# Patient Record
Sex: FEMALE | Race: WHITE | HISPANIC OR LATINO
[De-identification: names, ages, dates, MRNs, and addresses within clinical notes are randomized per-mention and may not be internally consistent; named-entity substitution may affect disease eponyms.]

---

## 2017-06-22 ENCOUNTER — LABORATORY RESULT (OUTPATIENT)
Age: 42
End: 2017-06-22

## 2017-06-22 ENCOUNTER — APPOINTMENT (OUTPATIENT)
Dept: INTERNAL MEDICINE | Facility: CLINIC | Age: 42
End: 2017-06-22

## 2017-06-22 VITALS
BODY MASS INDEX: 26.84 KG/M2 | SYSTOLIC BLOOD PRESSURE: 120 MMHG | DIASTOLIC BLOOD PRESSURE: 78 MMHG | OXYGEN SATURATION: 98 % | TEMPERATURE: 99.4 F | HEART RATE: 82 BPM | HEIGHT: 66 IN | RESPIRATION RATE: 16 BRPM | WEIGHT: 167 LBS

## 2017-06-22 DIAGNOSIS — B35.1 TINEA UNGUIUM: ICD-10-CM

## 2017-06-22 LAB
ALBUMIN SERPL ELPH-MCNC: 4.5 G/DL
ALP BLD-CCNC: 69 U/L
ALT SERPL-CCNC: 16 U/L
ANION GAP SERPL CALC-SCNC: 14 MMOL/L
APPEARANCE: CLEAR
AST SERPL-CCNC: 19 U/L
BASOPHILS # BLD AUTO: 0.01 K/UL
BASOPHILS NFR BLD AUTO: 0.2 %
BILIRUB SERPL-MCNC: 0.3 MG/DL
BILIRUBIN URINE: NEGATIVE
BLOOD URINE: ABNORMAL
BUN SERPL-MCNC: 11 MG/DL
CALCIUM SERPL-MCNC: 9.1 MG/DL
CHLORIDE SERPL-SCNC: 105 MMOL/L
CHOLEST SERPL-MCNC: 184 MG/DL
CHOLEST/HDLC SERPL: 3.4 RATIO
CO2 SERPL-SCNC: 23 MMOL/L
COLOR: YELLOW
CREAT SERPL-MCNC: 0.76 MG/DL
CREAT SPEC-SCNC: 134 MG/DL
EOSINOPHIL # BLD AUTO: 0.03 K/UL
EOSINOPHIL NFR BLD AUTO: 0.5 %
ERYTHROCYTE [SEDIMENTATION RATE] IN BLOOD BY WESTERGREN METHOD: 36 MM/HR
FOLATE SERPL-MCNC: >20 NG/ML
GGT SERPL-CCNC: 15 U/L
GLUCOSE QUALITATIVE U: NORMAL MG/DL
GLUCOSE SERPL-MCNC: 95 MG/DL
HBA1C MFR BLD HPLC: 5.5 %
HCT VFR BLD CALC: 30.6 %
HDLC SERPL-MCNC: 54 MG/DL
HGB BLD-MCNC: 9 G/DL
IMM GRANULOCYTES NFR BLD AUTO: 0.2 %
IRON SATN MFR SERPL: 4 %
IRON SERPL-MCNC: 21 UG/DL
KETONES URINE: NEGATIVE
LDLC SERPL CALC-MCNC: 116 MG/DL
LEUKOCYTE ESTERASE URINE: NEGATIVE
LYMPHOCYTES # BLD AUTO: 1.49 K/UL
LYMPHOCYTES NFR BLD AUTO: 25.9 %
MAN DIFF?: NORMAL
MCHC RBC-ENTMCNC: 22.1 PG
MCHC RBC-ENTMCNC: 29.4 GM/DL
MCV RBC AUTO: 75.2 FL
MICROALBUMIN 24H UR DL<=1MG/L-MCNC: 2.6 MG/DL
MICROALBUMIN/CREAT 24H UR-RTO: 19 MG/G
MONOCYTES # BLD AUTO: 0.31 K/UL
MONOCYTES NFR BLD AUTO: 5.4 %
NEUTROPHILS # BLD AUTO: 3.9 K/UL
NEUTROPHILS NFR BLD AUTO: 67.8 %
NITRITE URINE: NEGATIVE
PH URINE: 5.5
PLATELET # BLD AUTO: 317 K/UL
POTASSIUM SERPL-SCNC: 4.6 MMOL/L
PROT SERPL-MCNC: 7.4 G/DL
PROTEIN URINE: NEGATIVE MG/DL
RBC # BLD: 4.07 M/UL
RBC # FLD: 18.4 %
SODIUM SERPL-SCNC: 142 MMOL/L
SPECIFIC GRAVITY URINE: 1.02
T3 SERPL-MCNC: 110 NG/DL
T4 FREE SERPL-MCNC: 1 NG/DL
TIBC SERPL-MCNC: 508 UG/DL
TRIGL SERPL-MCNC: 71 MG/DL
TSH SERPL-ACNC: 1.54 UIU/ML
UIBC SERPL-MCNC: 487 UG/DL
UROBILINOGEN URINE: NORMAL MG/DL
VIT B12 SERPL-MCNC: 1176 PG/ML
WBC # FLD AUTO: 5.75 K/UL

## 2017-06-23 LAB — 25(OH)D3 SERPL-MCNC: 24.3 NG/ML

## 2017-09-16 ENCOUNTER — RX RENEWAL (OUTPATIENT)
Age: 42
End: 2017-09-16

## 2017-11-30 ENCOUNTER — APPOINTMENT (OUTPATIENT)
Dept: INTERNAL MEDICINE | Facility: CLINIC | Age: 42
End: 2017-11-30
Payer: MEDICAID

## 2017-11-30 ENCOUNTER — NON-APPOINTMENT (OUTPATIENT)
Age: 42
End: 2017-11-30

## 2017-11-30 VITALS
DIASTOLIC BLOOD PRESSURE: 70 MMHG | HEART RATE: 71 BPM | WEIGHT: 156 LBS | BODY MASS INDEX: 25.07 KG/M2 | RESPIRATION RATE: 16 BRPM | HEIGHT: 66 IN | OXYGEN SATURATION: 99 % | SYSTOLIC BLOOD PRESSURE: 120 MMHG | TEMPERATURE: 98.3 F

## 2017-11-30 LAB
BASOPHILS # BLD AUTO: 0.02 K/UL
EOSINOPHIL NFR BLD AUTO: 1 %
HGB BLD-MCNC: 12.8 G/DL
IMM GRANULOCYTES NFR BLD AUTO: 0 %
LYMPHOCYTES NFR BLD AUTO: 29.4 %
MCHC RBC-ENTMCNC: 31.7 GM/DL
MCV RBC AUTO: 97.6 FL
MONOCYTES # BLD AUTO: 0.37 K/UL
NEUTROPHILS # BLD AUTO: 2.94 K/UL

## 2017-11-30 PROCEDURE — 93000 ELECTROCARDIOGRAM COMPLETE: CPT

## 2017-11-30 PROCEDURE — 99214 OFFICE O/P EST MOD 30 MIN: CPT | Mod: 25

## 2017-12-01 LAB
BASOPHILS NFR BLD AUTO: 0.4 %
CHOLEST SERPL-MCNC: 185 MG/DL
CHOLEST/HDLC SERPL: 3.4 RATIO
EOSINOPHIL # BLD AUTO: 0.05 K/UL
FERRITIN SERPL-MCNC: 23 NG/ML
GGT SERPL-CCNC: 15 U/L
HBA1C MFR BLD HPLC: 5.4 %
HCT VFR BLD CALC: 40.4 %
HDLC SERPL-MCNC: 54 MG/DL
IRON SATN MFR SERPL: 18 %
IRON SERPL-MCNC: 77 UG/DL
LDLC SERPL CALC-MCNC: 116 MG/DL
LYMPHOCYTES # BLD AUTO: 1.41 K/UL
MAN DIFF?: NORMAL
MCHC RBC-ENTMCNC: 30.9 PG
MONOCYTES NFR BLD AUTO: 7.7 %
NEUTROPHILS NFR BLD AUTO: 61.5 %
PLATELET # BLD AUTO: 244 K/UL
RBC # BLD: 4.14 M/UL
RBC # FLD: 13.4 %
T3 SERPL-MCNC: 106 NG/DL
T4 FREE SERPL-MCNC: 0.9 NG/DL
TIBC SERPL-MCNC: 433 UG/DL
TRIGL SERPL-MCNC: 75 MG/DL
TSH SERPL-ACNC: 1.22 UIU/ML
UIBC SERPL-MCNC: 356 UG/DL
VIT B12 SERPL-MCNC: 1127 PG/ML
WBC # FLD AUTO: 4.79 K/UL

## 2017-12-07 ENCOUNTER — APPOINTMENT (OUTPATIENT)
Dept: INTERNAL MEDICINE | Facility: CLINIC | Age: 42
End: 2017-12-07

## 2017-12-13 LAB
ALBUMIN SERPL ELPH-MCNC: 4.2 G/DL
ALP BLD-CCNC: 59 U/L
ALT SERPL-CCNC: 21 U/L
ANION GAP SERPL CALC-SCNC: 13 MMOL/L
AST SERPL-CCNC: 23 U/L
BILIRUB SERPL-MCNC: 0.5 MG/DL
BUN SERPL-MCNC: 17 MG/DL
CALCIUM SERPL-MCNC: 9.8 MG/DL
CHLORIDE SERPL-SCNC: 104 MMOL/L
CO2 SERPL-SCNC: 24 MMOL/L
CREAT SERPL-MCNC: 0.72 MG/DL
GLUCOSE SERPL-MCNC: 87 MG/DL
POTASSIUM SERPL-SCNC: 5 MMOL/L
PROT SERPL-MCNC: 7.1 G/DL
SODIUM SERPL-SCNC: 141 MMOL/L

## 2018-01-01 ENCOUNTER — RX RENEWAL (OUTPATIENT)
Age: 43
End: 2018-01-01

## 2018-01-17 ENCOUNTER — RX RENEWAL (OUTPATIENT)
Age: 43
End: 2018-01-17

## 2018-02-06 ENCOUNTER — RX RENEWAL (OUTPATIENT)
Age: 43
End: 2018-02-06

## 2018-04-16 ENCOUNTER — RX RENEWAL (OUTPATIENT)
Age: 43
End: 2018-04-16

## 2018-06-21 ENCOUNTER — APPOINTMENT (OUTPATIENT)
Dept: INTERNAL MEDICINE | Facility: CLINIC | Age: 43
End: 2018-06-21
Payer: MEDICAID

## 2018-06-21 VITALS
BODY MASS INDEX: 26.2 KG/M2 | RESPIRATION RATE: 16 BRPM | HEIGHT: 66 IN | OXYGEN SATURATION: 99 % | SYSTOLIC BLOOD PRESSURE: 120 MMHG | WEIGHT: 163 LBS | DIASTOLIC BLOOD PRESSURE: 80 MMHG | TEMPERATURE: 98.9 F | HEART RATE: 63 BPM

## 2018-06-21 PROCEDURE — 99214 OFFICE O/P EST MOD 30 MIN: CPT

## 2018-06-21 NOTE — ASSESSMENT
[FreeTextEntry1] : 43 year old female found to have stable Iron Deficiency Anemia, Vitamin D Deficiency, Elevated Hemoglobin A1c, Chronic Back Pain,with the current regimen, diet and life style modifications, as counseled. Prior results reviewed and discussed with the patient during today's examination. Plan as ordered.\par Patient was recommended to follow up with GYN for routine examination, PAP smear and Mammogram, reports will be provided, when ready.\par

## 2018-06-21 NOTE — HISTORY OF PRESENT ILLNESS
[de-identified] : 43 year old  female patient with history of stable  Iron Deficiency Anemia, Vitamin D Deficiency, Elevated Hemoglobin A1c, Chronic Back Pain, history as stated, presented for follow up examination. Patient is compliant with all medications. Denies shortness of breath, chest pain or abdominal pains at this time. ROS as stated.\par

## 2018-06-21 NOTE — HEALTH RISK ASSESSMENT
[Discussed at today's visit] : Advance Directives Discussed at today's visit [Fully functional (bathing, dressing, toileting, transferring, walking, feeding)] : Fully functional (bathing, dressing, toileting, transferring, walking, feeding) [Fully functional (using the telephone, shopping, preparing meals, housekeeping, doing laundry, using] : Fully functional and needs no help or supervision to perform IADLs (using the telephone, shopping, preparing meals, housekeeping, doing laundry, using transportation, managing medications and managing finances) [No falls in past year] : Patient reported no falls in the past year [0] : 2) Feeling down, depressed, or hopeless: Not at all (0) [] : No [de-identified] : None [[id number]] : 0

## 2018-06-23 LAB
25(OH)D3 SERPL-MCNC: 39 NG/ML
ALBUMIN SERPL ELPH-MCNC: 4.1 G/DL
ALP BLD-CCNC: 53 U/L
ALT SERPL-CCNC: 20 U/L
ANION GAP SERPL CALC-SCNC: 9 MMOL/L
APPEARANCE: CLEAR
AST SERPL-CCNC: 24 U/L
BASOPHILS # BLD AUTO: 0.01 K/UL
BASOPHILS NFR BLD AUTO: 0.2 %
BILIRUB SERPL-MCNC: 0.4 MG/DL
BILIRUBIN URINE: NEGATIVE
BLOOD URINE: NEGATIVE
BUN SERPL-MCNC: 13 MG/DL
CALCIUM SERPL-MCNC: 9.2 MG/DL
CHLORIDE SERPL-SCNC: 104 MMOL/L
CHOLEST SERPL-MCNC: 169 MG/DL
CHOLEST/HDLC SERPL: 3 RATIO
CO2 SERPL-SCNC: 26 MMOL/L
COLOR: YELLOW
CREAT SERPL-MCNC: 0.67 MG/DL
CREAT SPEC-SCNC: 128 MG/DL
EOSINOPHIL # BLD AUTO: 0.03 K/UL
EOSINOPHIL NFR BLD AUTO: 0.6 %
ERYTHROCYTE [SEDIMENTATION RATE] IN BLOOD BY WESTERGREN METHOD: 3 MM/HR
FOLATE SERPL-MCNC: >20 NG/ML
GGT SERPL-CCNC: 15 U/L
GLUCOSE QUALITATIVE U: NEGATIVE MG/DL
GLUCOSE SERPL-MCNC: 79 MG/DL
HBA1C MFR BLD HPLC: 5.5 %
HCT VFR BLD CALC: 34.9 %
HDLC SERPL-MCNC: 56 MG/DL
HGB BLD-MCNC: 11.1 G/DL
IMM GRANULOCYTES NFR BLD AUTO: 0 %
IRON SATN MFR SERPL: 9 %
IRON SERPL-MCNC: 36 UG/DL
KETONES URINE: NEGATIVE
LDLC SERPL CALC-MCNC: 99 MG/DL
LEUKOCYTE ESTERASE URINE: NEGATIVE
LYMPHOCYTES # BLD AUTO: 1.26 K/UL
LYMPHOCYTES NFR BLD AUTO: 24.7 %
MAN DIFF?: NORMAL
MCHC RBC-ENTMCNC: 30.7 PG
MCHC RBC-ENTMCNC: 31.8 GM/DL
MCV RBC AUTO: 96.4 FL
MICROALBUMIN 24H UR DL<=1MG/L-MCNC: 0.5 MG/DL
MICROALBUMIN/CREAT 24H UR-RTO: 4 MG/G
MONOCYTES # BLD AUTO: 0.31 K/UL
MONOCYTES NFR BLD AUTO: 6.1 %
NEUTROPHILS # BLD AUTO: 3.5 K/UL
NEUTROPHILS NFR BLD AUTO: 68.4 %
NITRITE URINE: NEGATIVE
PH URINE: 6
PLATELET # BLD AUTO: 256 K/UL
POTASSIUM SERPL-SCNC: 4.3 MMOL/L
PROT SERPL-MCNC: 6.9 G/DL
PROTEIN URINE: NEGATIVE MG/DL
RBC # BLD: 3.62 M/UL
RBC # FLD: 13.1 %
SODIUM SERPL-SCNC: 139 MMOL/L
SPECIFIC GRAVITY URINE: 1.02
T3 SERPL-MCNC: 114 NG/DL
T4 FREE SERPL-MCNC: 1 NG/DL
TIBC SERPL-MCNC: 388 UG/DL
TRIGL SERPL-MCNC: 70 MG/DL
TSH SERPL-ACNC: 1.09 UIU/ML
UIBC SERPL-MCNC: 352 UG/DL
UROBILINOGEN URINE: NEGATIVE MG/DL
VIT B12 SERPL-MCNC: 1090 PG/ML
WBC # FLD AUTO: 5.11 K/UL

## 2019-01-01 ENCOUNTER — OUTPATIENT (OUTPATIENT)
Dept: OUTPATIENT SERVICES | Facility: HOSPITAL | Age: 44
LOS: 1 days | End: 2019-01-01
Payer: MEDICAID

## 2019-01-01 PROCEDURE — G9001: CPT

## 2019-01-10 ENCOUNTER — APPOINTMENT (OUTPATIENT)
Dept: INTERNAL MEDICINE | Facility: CLINIC | Age: 44
End: 2019-01-10
Payer: MEDICAID

## 2019-01-10 VITALS
BODY MASS INDEX: 26.03 KG/M2 | WEIGHT: 162 LBS | OXYGEN SATURATION: 100 % | HEIGHT: 66 IN | DIASTOLIC BLOOD PRESSURE: 70 MMHG | RESPIRATION RATE: 16 BRPM | SYSTOLIC BLOOD PRESSURE: 120 MMHG | TEMPERATURE: 98.4 F | HEART RATE: 69 BPM

## 2019-01-10 PROCEDURE — 99214 OFFICE O/P EST MOD 30 MIN: CPT

## 2019-01-10 NOTE — HISTORY OF PRESENT ILLNESS
[de-identified] : 43 year old  female patient with history of stable  Iron Deficiency Anemia, Vitamin D Deficiency, Elevated Hemoglobin A1c, Chronic Back Pain, history as stated, presented for follow up examination. Patient is compliant with all medications. Denies shortness of breath, chest pain or abdominal pains at this time. ROS as stated.\par

## 2019-01-10 NOTE — HEALTH RISK ASSESSMENT
[No falls in past year] : Patient reported no falls in the past year [0] : 2) Feeling down, depressed, or hopeless: Not at all (0) [] : No [de-identified] : None [[id number]] : 0

## 2019-01-10 NOTE — ASSESSMENT
[FreeTextEntry1] : 43 year old female found to have stable  Iron Deficiency Anemia, Vitamin D Deficiency, Elevated Hemoglobin A1c, Chronic Back Pain,with the current regimen, diet and life style modifications, as counseled. Prior results reviewed and discussed with the patient during today's examination. Plan as ordered.\par Current symptoms are consistent with Left knee arthropathy , prescription management and further followup as ordered.\par

## 2019-01-12 LAB
ALBUMIN SERPL ELPH-MCNC: 4.3 G/DL
ALP BLD-CCNC: 62 U/L
ALT SERPL-CCNC: 18 U/L
ANION GAP SERPL CALC-SCNC: 9 MMOL/L
AST SERPL-CCNC: 19 U/L
BASOPHILS # BLD AUTO: 0.01 K/UL
BASOPHILS NFR BLD AUTO: 0.2 %
BILIRUB SERPL-MCNC: 0.2 MG/DL
BUN SERPL-MCNC: 13 MG/DL
CALCIUM SERPL-MCNC: 9.3 MG/DL
CHLORIDE SERPL-SCNC: 105 MMOL/L
CHOLEST SERPL-MCNC: 178 MG/DL
CHOLEST/HDLC SERPL: 3.4 RATIO
CO2 SERPL-SCNC: 28 MMOL/L
CREAT SERPL-MCNC: 0.61 MG/DL
EOSINOPHIL # BLD AUTO: 0.04 K/UL
EOSINOPHIL NFR BLD AUTO: 0.9 %
GGT SERPL-CCNC: 14 U/L
GLUCOSE SERPL-MCNC: 95 MG/DL
HBA1C MFR BLD HPLC: 5.4 %
HCT VFR BLD CALC: 37.7 %
HDLC SERPL-MCNC: 53 MG/DL
HGB BLD-MCNC: 11.7 G/DL
IMM GRANULOCYTES NFR BLD AUTO: 0 %
IRON SATN MFR SERPL: 10 %
IRON SERPL-MCNC: 41 UG/DL
LDLC SERPL CALC-MCNC: 108 MG/DL
LYMPHOCYTES # BLD AUTO: 1.21 K/UL
LYMPHOCYTES NFR BLD AUTO: 27.8 %
MAN DIFF?: NORMAL
MCHC RBC-ENTMCNC: 29.5 PG
MCHC RBC-ENTMCNC: 31 GM/DL
MCV RBC AUTO: 95 FL
MONOCYTES # BLD AUTO: 0.27 K/UL
MONOCYTES NFR BLD AUTO: 6.2 %
NEUTROPHILS # BLD AUTO: 2.82 K/UL
NEUTROPHILS NFR BLD AUTO: 64.9 %
PLATELET # BLD AUTO: 274 K/UL
POTASSIUM SERPL-SCNC: 4.6 MMOL/L
PROT SERPL-MCNC: 7 G/DL
RBC # BLD: 3.97 M/UL
RBC # FLD: 12.9 %
SODIUM SERPL-SCNC: 142 MMOL/L
TIBC SERPL-MCNC: 423 UG/DL
TRIGL SERPL-MCNC: 84 MG/DL
UIBC SERPL-MCNC: 382 UG/DL
WBC # FLD AUTO: 4.35 K/UL

## 2019-01-18 ENCOUNTER — APPOINTMENT (OUTPATIENT)
Dept: ORTHOPEDIC SURGERY | Facility: CLINIC | Age: 44
End: 2019-01-18
Payer: MEDICAID

## 2019-01-18 VITALS
WEIGHT: 164 LBS | HEIGHT: 66 IN | DIASTOLIC BLOOD PRESSURE: 73 MMHG | BODY MASS INDEX: 26.36 KG/M2 | SYSTOLIC BLOOD PRESSURE: 114 MMHG | HEART RATE: 70 BPM

## 2019-01-18 DIAGNOSIS — M70.42 PREPATELLAR BURSITIS, LEFT KNEE: ICD-10-CM

## 2019-01-18 PROCEDURE — 99204 OFFICE O/P NEW MOD 45 MIN: CPT

## 2019-01-18 PROCEDURE — 73564 X-RAY EXAM KNEE 4 OR MORE: CPT | Mod: LT

## 2019-01-18 NOTE — DISCUSSION/SUMMARY
[de-identified] : Left knee prepatellar bursitis\par \par \par Discussed my findings and history exam and radiology\par \par Recommend activity modification to limit kneeling\par Recommend knee pads if kneeling\par \par Physical therapy for activities strengthening of the knee\par \par The patient was prescribed Diclofenac PO non-steroidal anti-inflammatory medication. 50mg tablets twice daily to be taken for at least 1-2 weeks in a row and then PRN afterwards. Risks and benefits were discussed and include but not limited to renal damage and GI ulceration and bleeding.  They were advised to take with food to limit stomach upset as well as warned to stop the medication if worsening gastric pain or dizziness or other side effects. Also to immediately stop the medication and seek appropriate medical attention if any severe stomach ache, gastritis, black/red vomit, black/red stools or any other medical concern.\par \par \par \par Warm moist compress\par \par Followup p.r.n.

## 2019-01-18 NOTE — PHYSICAL EXAM
[de-identified] : Physical Examination\par General: well nourished, in no acute distress, alert and oriented to person, place and time\par Psychiatric: normal mood and affect, no abnormal movements or speech patterns\par Eyes: vision intact - glasses\par Throat: no thyromegaly\par Lymph: no enlarged nodes, no lymphedema in extremity\par Respiratory: no wheezing, no shortness of breath with ambulation\par Cardiac: no cardiac leg swelling, 2+ peripheral pulses\par Neurology: normal gross sensation in extremities to light touch\par Abdomen: soft, non-tender, tympanic, no masses\par \par Musculoskeletal Examination\par Ambulation	-- antalgic gait, - assistive devices\par \par Knee			Right			Left\par General\par      Swelling/Deformity	normal			small swelling firm non fluctuant and inferior pole patella bursa	\par      Skin			normal			normal\par      Erythema		-			-\par      Standing Alignment	neutral			neutral\par      Effusion		none			none\par Range of Motion\par      Hip			full painless ROM		full painless ROM\par      Knee Flexion		130			130\par      Knee Extension	0			0\par Patella\par      J Sign		-			-\par      Quad Medial/Lateral	1/1 1/1\par      Apprehension		-			-\par      Quincy's		-			-\par      Grind Sign		-			-\par      Crepitus		-			-\par Palpation\par      Medial Joint Line	-			-\par      Medial Fem Condyle	-			-\par      Lateral Joint Line	-			-\par      Quad Tendon		-			-\par      Patella Tendon	-			-\par      Medial Patella		-			-\par      Lateral Patella 	-			-\par      Posterior Knee	-			-\par Ligamentous\par      Varus @ 0° / 30°	-/-			-/-\par      Valgus @ 0° / 30°	-/-			-/-\par      Lachman		-			-\par      Pivot Shift		-			-\par      Anterior Drawer	-			-\par      Posterior Drawer	-			-\par Meniscus\par      Elijah		-			-\par      Flexion Pinch		-			-\par Strength Examination/Atrophy\par      Hip Flexors 		5+			5+\par      Quadriceps		5+			5+\par      Hamstring		5+			5+\par      Tibialis Anterior	5+			5+\par      Achilles/Soleus	5+			5+\par Sensation\par      Deep Peroneal	normal			normal\par      Superficial Peroneal 	normal			normal\par      Sural  		normal			normal\par      Posterior Tibial 	normal			normal\par      Saphneous 		normal			normal\par Pulses\par      DP			2+			2+\par  [de-identified] : 5 views of the affected Left knee (standing AP, flexing standing AP, 30degree flexed lateral, 0degree lateral, sunrise view)\par were ordered, obtained and evaluated by myself today and\par demonstrate:\par There is trace medial weight bearing as fractured narrowing\par Trace osteophytic lipping\par no suprapatellar effusion\par Trace lateral patellofemoral joint space loss without evidence of tilt [or] subluxation on sunrise view\par Normal soft tissue density\par Otherwise normal osseous bone structure without fracture or dislocation

## 2019-01-25 DIAGNOSIS — Z71.89 OTHER SPECIFIED COUNSELING: ICD-10-CM

## 2019-03-08 ENCOUNTER — RX RENEWAL (OUTPATIENT)
Age: 44
End: 2019-03-08

## 2019-05-14 ENCOUNTER — APPOINTMENT (OUTPATIENT)
Dept: CARDIOLOGY | Facility: CLINIC | Age: 44
End: 2019-05-14
Payer: MEDICAID

## 2019-05-14 VITALS
HEIGHT: 66 IN | DIASTOLIC BLOOD PRESSURE: 85 MMHG | SYSTOLIC BLOOD PRESSURE: 133 MMHG | TEMPERATURE: 99.1 F | HEART RATE: 75 BPM | OXYGEN SATURATION: 100 %

## 2019-05-14 DIAGNOSIS — Z82.49 FAMILY HISTORY OF ISCHEMIC HEART DISEASE AND OTHER DISEASES OF THE CIRCULATORY SYSTEM: ICD-10-CM

## 2019-05-14 PROCEDURE — 99204 OFFICE O/P NEW MOD 45 MIN: CPT

## 2019-05-14 PROCEDURE — 93000 ELECTROCARDIOGRAM COMPLETE: CPT

## 2019-05-23 ENCOUNTER — OUTPATIENT (OUTPATIENT)
Dept: OUTPATIENT SERVICES | Facility: HOSPITAL | Age: 44
LOS: 1 days | End: 2019-05-23
Payer: MEDICAID

## 2019-05-23 DIAGNOSIS — R06.09 OTHER FORMS OF DYSPNEA: ICD-10-CM

## 2019-05-23 DIAGNOSIS — R00.2 PALPITATIONS: ICD-10-CM

## 2019-05-23 PROCEDURE — 93306 TTE W/DOPPLER COMPLETE: CPT | Mod: 26

## 2019-05-23 PROCEDURE — 93018 CV STRESS TEST I&R ONLY: CPT

## 2019-05-23 PROCEDURE — 93306 TTE W/DOPPLER COMPLETE: CPT

## 2019-05-23 PROCEDURE — 93016 CV STRESS TEST SUPVJ ONLY: CPT

## 2019-05-23 PROCEDURE — 93017 CV STRESS TEST TRACING ONLY: CPT

## 2019-05-24 NOTE — ADDENDUM
[FreeTextEntry1] : ADDENDUM 5/24: Patient achieved 9 METS on stress test yesterday with no evidence of ischemia. Her echocardiogram showed preserved EF and normal RV pressures. Will refer her to see pulmonologist Dr. Gonzales. Results d/w patient's  over the phone at 16:20, I gave him Dr. Gonzales's office #.

## 2019-05-24 NOTE — ASSESSMENT
[FreeTextEntry1] : 44-year-old female with no significant medical history who presents for evaluation of exertional dyspnea as well as palpitations.\par \par 1. Exertional dyspnea: while this may be the result of deconditioning, will check echocardiogram to assess for any structural heart abnormalities\par -There is lower suspicion for CAD as contributing to patient's etiology given her lack of comorbidities, family history, or other cardiac risk factors. Therefore, will send patient for treadmill EKG stress test for further assessment of functional capacity\par -If above testing is noncontributory, will subsequently refer patient to pulmonologist to check PFTs and evaluate for underlying asthma or other lung disease\par \par 2. Palpitations:\par -Symptoms are infrequent and mild in severity; even if patient has an underlying arrhythmia such as SVT, she would probably not benefit from any treatment at this time given the mildness of symptoms\par -Will send patient for exercise stress test as per above to see if arrhythmia can be provoked\par -Otherwise, patient will let us know if symptoms become more frequent or bothersome, at that point we can perform additional testing as needed

## 2019-05-24 NOTE — HISTORY OF PRESENT ILLNESS
[FreeTextEntry1] : 44-year-old female with anxiety who presents for evaluation of exertional dyspnea. She reports that she feels dyspnea whenever she ascends a set of stairs. There is no associated chest pain, lightheadedness, palpitations, or syncope. Patient does not get symptoms at rest, and she denies any lower extremity edema, orthopnea, or PND.\par \par Patient also reports that she occasionally gets palpitations. These occur infrequently, perhaps once every one to 2 months, and last for approximately 10 minutes. Symptoms do not wake patient up from sleep, they are associated with dyspnea but resolve of their own accord.\par \par Currently, patient has no symptoms in the office.

## 2019-06-25 ENCOUNTER — APPOINTMENT (OUTPATIENT)
Dept: PULMONOLOGY | Facility: CLINIC | Age: 44
End: 2019-06-25
Payer: MEDICAID

## 2019-06-25 VITALS
WEIGHT: 171 LBS | SYSTOLIC BLOOD PRESSURE: 143 MMHG | OXYGEN SATURATION: 99 % | TEMPERATURE: 98.7 F | HEART RATE: 63 BPM | HEIGHT: 66 IN | DIASTOLIC BLOOD PRESSURE: 77 MMHG | BODY MASS INDEX: 27.48 KG/M2

## 2019-06-25 DIAGNOSIS — Z82.49 FAMILY HISTORY OF ISCHEMIC HEART DISEASE AND OTHER DISEASES OF THE CIRCULATORY SYSTEM: ICD-10-CM

## 2019-06-25 DIAGNOSIS — Z82.5 FAMILY HISTORY OF ASTHMA AND OTHER CHRONIC LOWER RESPIRATORY DISEASES: ICD-10-CM

## 2019-06-25 DIAGNOSIS — Z56.0 UNEMPLOYMENT, UNSPECIFIED: ICD-10-CM

## 2019-06-25 PROCEDURE — 94726 PLETHYSMOGRAPHY LUNG VOLUMES: CPT

## 2019-06-25 PROCEDURE — ZZZZZ: CPT

## 2019-06-25 PROCEDURE — 94729 DIFFUSING CAPACITY: CPT

## 2019-06-25 PROCEDURE — 99203 OFFICE O/P NEW LOW 30 MIN: CPT | Mod: 25

## 2019-06-25 PROCEDURE — 94010 BREATHING CAPACITY TEST: CPT

## 2019-06-25 SDOH — ECONOMIC STABILITY - INCOME SECURITY: UNEMPLOYMENT, UNSPECIFIED: Z56.0

## 2019-06-26 PROBLEM — Z82.5 FAMILY HISTORY OF ASTHMA: Status: ACTIVE | Noted: 2019-06-25

## 2019-06-26 PROBLEM — Z56.0 UNEMPLOYED: Status: ACTIVE | Noted: 2019-06-25

## 2019-06-26 PROBLEM — Z82.49 FAMILY HISTORY OF MYOCARDIAL INFARCTION: Status: ACTIVE | Noted: 2019-06-25

## 2019-06-26 PROBLEM — Z82.49 FAMILY HISTORY OF PULMONARY EMBOLISM: Status: ACTIVE | Noted: 2019-06-25

## 2019-06-26 PROBLEM — Z82.49 FAMILY HISTORY OF HYPERTENSION: Status: ACTIVE | Noted: 2019-06-25

## 2019-06-26 NOTE — PHYSICAL EXAM
[General Appearance - Well Developed] : well developed [Normal Appearance] : normal appearance [Well Groomed] : well groomed [General Appearance - Well Nourished] : well nourished [No Deformities] : no deformities [General Appearance - In No Acute Distress] : no acute distress [Normal Conjunctiva] : the conjunctiva exhibited no abnormalities [Normal Oropharynx] : normal oropharynx [Neck Appearance] : the appearance of the neck was normal [Neck Cervical Mass (___cm)] : no neck mass was observed [Jugular Venous Distention Increased] : there was no jugular-venous distention [Heart Rate And Rhythm] : heart rate and rhythm were normal [Heart Sounds] : normal S1 and S2 [Murmurs] : no murmurs present [Edema] : no peripheral edema present [Respiration, Rhythm And Depth] : normal respiratory rhythm and effort [Exaggerated Use Of Accessory Muscles For Inspiration] : no accessory muscle use [Auscultation Breath Sounds / Voice Sounds] : lungs were clear to auscultation bilaterally [Abdomen Soft] : soft [Abdomen Tenderness] : non-tender [Abnormal Walk] : normal gait [Nail Clubbing] : no clubbing of the fingernails [Cyanosis, Localized] : no localized cyanosis [Skin Color & Pigmentation] : normal skin color and pigmentation [Skin Turgor] : normal skin turgor [] : no rash [No Focal Deficits] : no focal deficits [Oriented To Time, Place, And Person] : oriented to person, place, and time [Impaired Insight] : insight and judgment were intact

## 2019-07-02 ENCOUNTER — RX RENEWAL (OUTPATIENT)
Age: 44
End: 2019-07-02

## 2019-09-09 ENCOUNTER — RX RENEWAL (OUTPATIENT)
Age: 44
End: 2019-09-09

## 2019-11-19 ENCOUNTER — APPOINTMENT (OUTPATIENT)
Dept: INTERNAL MEDICINE | Facility: CLINIC | Age: 44
End: 2019-11-19
Payer: MEDICAID

## 2019-11-19 ENCOUNTER — LABORATORY RESULT (OUTPATIENT)
Age: 44
End: 2019-11-19

## 2019-11-19 VITALS
RESPIRATION RATE: 16 BRPM | TEMPERATURE: 98.3 F | BODY MASS INDEX: 27.64 KG/M2 | HEART RATE: 64 BPM | DIASTOLIC BLOOD PRESSURE: 80 MMHG | HEIGHT: 66 IN | WEIGHT: 172 LBS | OXYGEN SATURATION: 98 % | SYSTOLIC BLOOD PRESSURE: 120 MMHG

## 2019-11-19 DIAGNOSIS — R20.2 PARESTHESIA OF SKIN: ICD-10-CM

## 2019-11-19 PROCEDURE — 99214 OFFICE O/P EST MOD 30 MIN: CPT

## 2019-11-19 NOTE — HEALTH RISK ASSESSMENT
[No] : In the past 12 months have you used drugs other than those required for medical reasons? No [No falls in past year] : Patient reported no falls in the past year [0] : 2) Feeling down, depressed, or hopeless: Not at all (0) [] : No [[id number]] : 0 [de-identified] : PULM

## 2019-11-19 NOTE — HISTORY OF PRESENT ILLNESS
[de-identified] : 43 year old  female patient with history of stable  Iron Deficiency Anemia, Vitamin D Deficiency, Elevated Hemoglobin A1c, Chronic Back Pain, history as stated, presented for follow up examination. Patient is compliant with all medications. Denies shortness of breath, chest pain or abdominal pains at this time. ROS as stated.\par

## 2019-11-19 NOTE — ASSESSMENT
[FreeTextEntry1] : 44 year old female found to have stable  Iron Deficiency Anemia, Vitamin D Deficiency, Elevated Hemoglobin A1c, Chronic Back Pain, Renal Stone, with the current regimen, diet and life style modifications, as counseled. Prior results reviewed and discussed with the patient during today's examination. Plan as ordered.\par

## 2019-11-20 LAB
ALBUMIN SERPL ELPH-MCNC: 4.4 G/DL
ALP BLD-CCNC: 72 U/L
ALT SERPL-CCNC: 14 U/L
ANION GAP SERPL CALC-SCNC: 14 MMOL/L
APPEARANCE: CLEAR
AST SERPL-CCNC: 18 U/L
BASOPHILS # BLD AUTO: 0.04 K/UL
BASOPHILS NFR BLD AUTO: 0.5 %
BILIRUB SERPL-MCNC: 0.2 MG/DL
BILIRUBIN URINE: NEGATIVE
BLOOD URINE: ABNORMAL
BUN SERPL-MCNC: 12 MG/DL
CALCIUM SERPL-MCNC: 9.9 MG/DL
CHLORIDE SERPL-SCNC: 101 MMOL/L
CHOLEST SERPL-MCNC: 194 MG/DL
CHOLEST/HDLC SERPL: 4 RATIO
CO2 SERPL-SCNC: 24 MMOL/L
COLOR: NORMAL
CREAT SERPL-MCNC: 0.64 MG/DL
EOSINOPHIL # BLD AUTO: 0.08 K/UL
EOSINOPHIL NFR BLD AUTO: 1.1 %
ESTIMATED AVERAGE GLUCOSE: 111 MG/DL
GGT SERPL-CCNC: 20 U/L
GLUCOSE QUALITATIVE U: NEGATIVE
GLUCOSE SERPL-MCNC: 86 MG/DL
HBA1C MFR BLD HPLC: 5.5 %
HCT VFR BLD CALC: 41.6 %
HDLC SERPL-MCNC: 48 MG/DL
HGB BLD-MCNC: 12.8 G/DL
IMM GRANULOCYTES NFR BLD AUTO: 0.4 %
IRON SATN MFR SERPL: 15 %
IRON SERPL-MCNC: 66 UG/DL
KETONES URINE: NEGATIVE
LDLC SERPL CALC-MCNC: 110 MG/DL
LEUKOCYTE ESTERASE URINE: NEGATIVE
LYMPHOCYTES # BLD AUTO: 2.19 K/UL
LYMPHOCYTES NFR BLD AUTO: 29 %
MAN DIFF?: NORMAL
MCHC RBC-ENTMCNC: 29.7 PG
MCHC RBC-ENTMCNC: 30.8 GM/DL
MCV RBC AUTO: 96.5 FL
MONOCYTES # BLD AUTO: 0.51 K/UL
MONOCYTES NFR BLD AUTO: 6.7 %
NEUTROPHILS # BLD AUTO: 4.71 K/UL
NEUTROPHILS NFR BLD AUTO: 62.3 %
NITRITE URINE: NEGATIVE
PH URINE: 6
PLATELET # BLD AUTO: 287 K/UL
POTASSIUM SERPL-SCNC: 4.3 MMOL/L
PROT SERPL-MCNC: 7.3 G/DL
PROTEIN URINE: NEGATIVE
RBC # BLD: 4.31 M/UL
RBC # FLD: 12.6 %
SODIUM SERPL-SCNC: 139 MMOL/L
SPECIFIC GRAVITY URINE: 1.02
TIBC SERPL-MCNC: 443 UG/DL
TRIGL SERPL-MCNC: 179 MG/DL
TSH SERPL-ACNC: 1.55 UIU/ML
UIBC SERPL-MCNC: 377 UG/DL
UROBILINOGEN URINE: NORMAL
WBC # FLD AUTO: 7.56 K/UL

## 2019-11-21 LAB — BACTERIA UR CULT: NORMAL

## 2019-12-11 ENCOUNTER — RX RENEWAL (OUTPATIENT)
Age: 44
End: 2019-12-11

## 2020-05-12 ENCOUNTER — APPOINTMENT (OUTPATIENT)
Dept: INTERNAL MEDICINE | Facility: CLINIC | Age: 45
End: 2020-05-12

## 2020-05-14 ENCOUNTER — APPOINTMENT (OUTPATIENT)
Dept: INTERNAL MEDICINE | Facility: CLINIC | Age: 45
End: 2020-05-14
Payer: MEDICAID

## 2020-05-14 VITALS
RESPIRATION RATE: 16 BRPM | HEIGHT: 66 IN | HEART RATE: 78 BPM | OXYGEN SATURATION: 100 % | WEIGHT: 179 LBS | SYSTOLIC BLOOD PRESSURE: 126 MMHG | BODY MASS INDEX: 28.77 KG/M2 | TEMPERATURE: 99 F | DIASTOLIC BLOOD PRESSURE: 81 MMHG

## 2020-05-14 DIAGNOSIS — Z00.00 ENCOUNTER FOR GENERAL ADULT MEDICAL EXAMINATION W/OUT ABNORMAL FINDINGS: ICD-10-CM

## 2020-05-14 PROCEDURE — 99214 OFFICE O/P EST MOD 30 MIN: CPT

## 2020-05-14 NOTE — HISTORY OF PRESENT ILLNESS
[de-identified] : 45 year old  female patient with history of stable  Iron Deficiency Anemia, Vitamin D Deficiency, Elevated Hemoglobin A1c, Chronic Back Pain, history as stated, presented for follow up examination. Patient is compliant with all medications. Denies shortness of breath, chest pain or abdominal pains at this time. ROS as stated.\par

## 2020-05-14 NOTE — ASSESSMENT
[FreeTextEntry1] : 45 year old female found to have stable Iron Deficiency Anemia, Vitamin D Deficiency, Elevated Hemoglobin A1c, Chronic Back Pain, Renal Stone,with the current regimen, diet and life style modifications, as counseled. Prior results reviewed and discussed with the patient during today's examination. Plan as ordered.\par

## 2020-05-21 ENCOUNTER — LABORATORY RESULT (OUTPATIENT)
Age: 45
End: 2020-05-21

## 2020-05-22 ENCOUNTER — LABORATORY RESULT (OUTPATIENT)
Age: 45
End: 2020-05-22

## 2020-05-22 ENCOUNTER — APPOINTMENT (OUTPATIENT)
Dept: OBGYN | Facility: CLINIC | Age: 45
End: 2020-05-22
Payer: MEDICAID

## 2020-05-22 ENCOUNTER — ASOB RESULT (OUTPATIENT)
Age: 45
End: 2020-05-22

## 2020-05-22 VITALS — BODY MASS INDEX: 29.05 KG/M2 | DIASTOLIC BLOOD PRESSURE: 78 MMHG | WEIGHT: 180 LBS | SYSTOLIC BLOOD PRESSURE: 128 MMHG

## 2020-05-22 DIAGNOSIS — Z86.59 PERSONAL HISTORY OF OTHER MENTAL AND BEHAVIORAL DISORDERS: ICD-10-CM

## 2020-05-22 DIAGNOSIS — N39.3 STRESS INCONTINENCE (FEMALE) (MALE): ICD-10-CM

## 2020-05-22 DIAGNOSIS — N81.6 CYSTOCELE, UNSPECIFIED: ICD-10-CM

## 2020-05-22 DIAGNOSIS — Z86.2 PERSONAL HISTORY OF DISEASES OF THE BLOOD AND BLOOD-FORMING ORGANS AND CERTAIN DISORDERS INVOLVING THE IMMUNE MECHANISM: ICD-10-CM

## 2020-05-22 DIAGNOSIS — N81.10 CYSTOCELE, UNSPECIFIED: ICD-10-CM

## 2020-05-22 DIAGNOSIS — D25.9 LEIOMYOMA OF UTERUS, UNSPECIFIED: ICD-10-CM

## 2020-05-22 LAB
25(OH)D3 SERPL-MCNC: 32.2 NG/ML
ALBUMIN SERPL ELPH-MCNC: 4.2 G/DL
ALP BLD-CCNC: 64 U/L
ALT SERPL-CCNC: 23 U/L
ANION GAP SERPL CALC-SCNC: 11 MMOL/L
APPEARANCE: CLEAR
AST SERPL-CCNC: 21 U/L
BASOPHILS # BLD AUTO: 0.01 K/UL
BASOPHILS NFR BLD AUTO: 0.2 %
BILIRUB SERPL-MCNC: 0.2 MG/DL
BILIRUBIN URINE: NEGATIVE
BLOOD URINE: ABNORMAL
BUN SERPL-MCNC: 10 MG/DL
CALCIUM SERPL-MCNC: 9.5 MG/DL
CHLORIDE SERPL-SCNC: 104 MMOL/L
CHOLEST SERPL-MCNC: 191 MG/DL
CHOLEST/HDLC SERPL: 3.7 RATIO
CO2 SERPL-SCNC: 27 MMOL/L
COLOR: YELLOW
CREAT SERPL-MCNC: 0.64 MG/DL
CREAT SPEC-SCNC: 134 MG/DL
EOSINOPHIL # BLD AUTO: 0.09 K/UL
EOSINOPHIL NFR BLD AUTO: 1.9 %
ERYTHROCYTE [SEDIMENTATION RATE] IN BLOOD BY WESTERGREN METHOD: 22 MM/HR
ESTIMATED AVERAGE GLUCOSE: 111 MG/DL
FOLATE SERPL-MCNC: 18 NG/ML
GGT SERPL-CCNC: 19 U/L
GLUCOSE QUALITATIVE U: NEGATIVE
GLUCOSE SERPL-MCNC: 94 MG/DL
HBA1C MFR BLD HPLC: 5.5 %
HCT VFR BLD CALC: 37.4 %
HDLC SERPL-MCNC: 52 MG/DL
HGB BLD-MCNC: 11.2 G/DL
IMM GRANULOCYTES NFR BLD AUTO: 0.2 %
IRON SATN MFR SERPL: 10 %
IRON SERPL-MCNC: 42 UG/DL
KETONES URINE: NEGATIVE
LDLC SERPL CALC-MCNC: 115 MG/DL
LEUKOCYTE ESTERASE URINE: ABNORMAL
LYMPHOCYTES # BLD AUTO: 1.34 K/UL
LYMPHOCYTES NFR BLD AUTO: 27.6 %
MAN DIFF?: NORMAL
MCHC RBC-ENTMCNC: 29.2 PG
MCHC RBC-ENTMCNC: 29.9 GM/DL
MCV RBC AUTO: 97.4 FL
MICROALBUMIN 24H UR DL<=1MG/L-MCNC: 1.5 MG/DL
MICROALBUMIN/CREAT 24H UR-RTO: 11 MG/G
MONOCYTES # BLD AUTO: 0.35 K/UL
MONOCYTES NFR BLD AUTO: 7.2 %
NEUTROPHILS # BLD AUTO: 3.05 K/UL
NEUTROPHILS NFR BLD AUTO: 62.9 %
NITRITE URINE: NEGATIVE
PH URINE: 7
PLATELET # BLD AUTO: 268 K/UL
POTASSIUM SERPL-SCNC: 4.6 MMOL/L
PROT SERPL-MCNC: 6.6 G/DL
PROTEIN URINE: NORMAL
RBC # BLD: 3.84 M/UL
RBC # FLD: 13.6 %
SARS-COV-2 IGG SERPL IA-ACNC: <0.1 INDEX
SARS-COV-2 IGG SERPL QL IA: NEGATIVE
SODIUM SERPL-SCNC: 142 MMOL/L
SPECIFIC GRAVITY URINE: 1.02
T3 SERPL-MCNC: 115 NG/DL
T4 FREE SERPL-MCNC: 0.9 NG/DL
TIBC SERPL-MCNC: 416 UG/DL
TRIGL SERPL-MCNC: 116 MG/DL
TSH SERPL-ACNC: 1.43 UIU/ML
UIBC SERPL-MCNC: 374 UG/DL
UROBILINOGEN URINE: NORMAL
VIT B12 SERPL-MCNC: 1139 PG/ML
WBC # FLD AUTO: 4.85 K/UL

## 2020-05-22 PROCEDURE — 99386 PREV VISIT NEW AGE 40-64: CPT

## 2020-05-22 PROCEDURE — 99204 OFFICE O/P NEW MOD 45 MIN: CPT | Mod: 25

## 2020-05-22 PROCEDURE — 76856 US EXAM PELVIC COMPLETE: CPT

## 2020-05-22 NOTE — PHYSICAL EXAM
[Awake] : awake [Alert] : alert [Soft] : soft [Oriented x3] : oriented to person, place, and time [Normal] : uterus [Rectocele] : a rectocele [No Bleeding] : there was no active vaginal bleeding [Cystocele] : a cystocele [Uterine Adnexae] : were not tender and not enlarged [Acute Distress] : no acute distress [LAD] : no lymphadenopathy [Thyroid Nodule] : no thyroid nodule [Mass] : no breast mass [Goiter] : no goiter [Nipple Discharge] : no nipple discharge [Axillary LAD] : no axillary lymphadenopathy [Distended] : not distended [H/Smegaly] : no hepatosplenomegaly [Tender] : non tender [FreeTextEntry4] : grade 1-2 cystocele

## 2020-05-22 NOTE — COUNSELING
[Exercise] : exercise [Nutrition] : nutrition [Breast Self Exam] : breast self exam [Vaccines] : vaccines [Vitamins/Supplements] : vitamins/supplements [STD (testing, results, tx)] : STD (testing, results, tx)

## 2020-12-08 ENCOUNTER — APPOINTMENT (OUTPATIENT)
Dept: INTERNAL MEDICINE | Facility: CLINIC | Age: 45
End: 2020-12-08
Payer: MEDICAID

## 2020-12-08 VITALS
TEMPERATURE: 98 F | WEIGHT: 182 LBS | OXYGEN SATURATION: 99 % | SYSTOLIC BLOOD PRESSURE: 116 MMHG | HEIGHT: 66 IN | BODY MASS INDEX: 29.25 KG/M2 | RESPIRATION RATE: 16 BRPM | DIASTOLIC BLOOD PRESSURE: 76 MMHG | HEART RATE: 73 BPM

## 2020-12-08 PROCEDURE — 99072 ADDL SUPL MATRL&STAF TM PHE: CPT

## 2020-12-08 PROCEDURE — 99214 OFFICE O/P EST MOD 30 MIN: CPT

## 2020-12-08 NOTE — HEALTH RISK ASSESSMENT
[No] : In the past 12 months have you used drugs other than those required for medical reasons? No [No falls in past year] : Patient reported no falls in the past year [0] : 2) Feeling down, depressed, or hopeless: Not at all (0) [] : No [de-identified] : None [[id number]] : 0

## 2020-12-08 NOTE — HISTORY OF PRESENT ILLNESS
[de-identified] : 45 year old  female patient with history of stable  Iron Deficiency Anemia, Vitamin D Deficiency, Elevated Hemoglobin A1c, Chronic Back Pain, history as stated, presented for follow up examination. Patient is compliant with all medications. Denies shortness of breath, chest pain or abdominal pains at this time. ROS as stated.\par

## 2020-12-11 LAB
ALBUMIN SERPL ELPH-MCNC: 4.3 G/DL
ALP BLD-CCNC: 79 U/L
ALT SERPL-CCNC: 19 U/L
ANION GAP SERPL CALC-SCNC: 9 MMOL/L
AST SERPL-CCNC: 16 U/L
BASOPHILS # BLD AUTO: 0.03 K/UL
BASOPHILS NFR BLD AUTO: 0.6 %
BILIRUB SERPL-MCNC: <0.2 MG/DL
BUN SERPL-MCNC: 13 MG/DL
CALCIUM SERPL-MCNC: 10 MG/DL
CHLORIDE SERPL-SCNC: 103 MMOL/L
CHOLEST SERPL-MCNC: 182 MG/DL
CO2 SERPL-SCNC: 27 MMOL/L
CREAT SERPL-MCNC: 0.78 MG/DL
EOSINOPHIL # BLD AUTO: 0.08 K/UL
EOSINOPHIL NFR BLD AUTO: 1.5 %
ESTIMATED AVERAGE GLUCOSE: 111 MG/DL
GGT SERPL-CCNC: 18 U/L
GLUCOSE SERPL-MCNC: 94 MG/DL
HBA1C MFR BLD HPLC: 5.5 %
HCT VFR BLD CALC: 36.5 %
HDLC SERPL-MCNC: 51 MG/DL
HGB BLD-MCNC: 11.1 G/DL
IMM GRANULOCYTES NFR BLD AUTO: 0.2 %
IRON SATN MFR SERPL: 9 %
IRON SERPL-MCNC: 35 UG/DL
LDLC SERPL CALC-MCNC: 112 MG/DL
LYMPHOCYTES # BLD AUTO: 1.25 K/UL
LYMPHOCYTES NFR BLD AUTO: 23.8 %
MAN DIFF?: NORMAL
MCHC RBC-ENTMCNC: 28.9 PG
MCHC RBC-ENTMCNC: 30.4 GM/DL
MCV RBC AUTO: 95.1 FL
MONOCYTES # BLD AUTO: 0.33 K/UL
MONOCYTES NFR BLD AUTO: 6.3 %
NEUTROPHILS # BLD AUTO: 3.55 K/UL
NEUTROPHILS NFR BLD AUTO: 67.6 %
NONHDLC SERPL-MCNC: 131 MG/DL
PLATELET # BLD AUTO: 315 K/UL
POTASSIUM SERPL-SCNC: 5.3 MMOL/L
PROT SERPL-MCNC: 6.9 G/DL
RBC # BLD: 3.84 M/UL
RBC # FLD: 12.9 %
SODIUM SERPL-SCNC: 139 MMOL/L
TIBC SERPL-MCNC: 398 UG/DL
TRIGL SERPL-MCNC: 93 MG/DL
UIBC SERPL-MCNC: 362 UG/DL
WBC # FLD AUTO: 5.25 K/UL

## 2021-06-30 ENCOUNTER — LABORATORY RESULT (OUTPATIENT)
Age: 46
End: 2021-06-30

## 2021-06-30 ENCOUNTER — APPOINTMENT (OUTPATIENT)
Dept: OBGYN | Facility: CLINIC | Age: 46
End: 2021-06-30
Payer: MEDICAID

## 2021-06-30 ENCOUNTER — ASOB RESULT (OUTPATIENT)
Age: 46
End: 2021-06-30

## 2021-06-30 VITALS — DIASTOLIC BLOOD PRESSURE: 84 MMHG | WEIGHT: 182 LBS | BODY MASS INDEX: 29.38 KG/M2 | SYSTOLIC BLOOD PRESSURE: 128 MMHG

## 2021-06-30 PROCEDURE — 99396 PREV VISIT EST AGE 40-64: CPT

## 2021-06-30 PROCEDURE — 76856 US EXAM PELVIC COMPLETE: CPT

## 2021-06-30 NOTE — HISTORY OF PRESENT ILLNESS
[FreeTextEntry1] : Patient presents for her annual exam.  Reports regular menses, one partner, no abdominal or pelvic pain, change in discharge, change in bowel or bladder habits or any other concerns.  States that she is sexually active with no complaints.  Due for pap and mammo.

## 2021-08-19 ENCOUNTER — EMERGENCY (EMERGENCY)
Facility: HOSPITAL | Age: 46
LOS: 1 days | Discharge: HOME | End: 2021-08-19
Attending: EMERGENCY MEDICINE
Payer: MEDICAID

## 2021-08-19 VITALS
WEIGHT: 182.1 LBS | DIASTOLIC BLOOD PRESSURE: 100 MMHG | HEART RATE: 102 BPM | SYSTOLIC BLOOD PRESSURE: 168 MMHG | OXYGEN SATURATION: 100 % | TEMPERATURE: 99 F | HEIGHT: 66 IN | RESPIRATION RATE: 16 BRPM

## 2021-08-19 PROCEDURE — 99284 EMERGENCY DEPT VISIT MOD MDM: CPT

## 2021-08-20 ENCOUNTER — NON-APPOINTMENT (OUTPATIENT)
Age: 46
End: 2021-08-20

## 2021-08-20 VITALS
RESPIRATION RATE: 18 BRPM | OXYGEN SATURATION: 100 % | SYSTOLIC BLOOD PRESSURE: 126 MMHG | TEMPERATURE: 99 F | DIASTOLIC BLOOD PRESSURE: 77 MMHG | HEART RATE: 71 BPM

## 2021-08-20 LAB
ALBUMIN SERPL ELPH-MCNC: 4.4 G/DL — SIGNIFICANT CHANGE UP (ref 3.3–5)
ALP SERPL-CCNC: 81 U/L — SIGNIFICANT CHANGE UP (ref 40–120)
ALT FLD-CCNC: 16 U/L — SIGNIFICANT CHANGE UP (ref 10–45)
ANION GAP SERPL CALC-SCNC: 13 MMOL/L — SIGNIFICANT CHANGE UP (ref 5–17)
AST SERPL-CCNC: 16 U/L — SIGNIFICANT CHANGE UP (ref 10–40)
BASOPHILS # BLD AUTO: 0.02 K/UL — SIGNIFICANT CHANGE UP (ref 0–0.2)
BASOPHILS NFR BLD AUTO: 0.3 % — SIGNIFICANT CHANGE UP (ref 0–2)
BILIRUB SERPL-MCNC: 0.2 MG/DL — SIGNIFICANT CHANGE UP (ref 0.2–1.2)
BUN SERPL-MCNC: 17 MG/DL — SIGNIFICANT CHANGE UP (ref 7–23)
CALCIUM SERPL-MCNC: 9.3 MG/DL — SIGNIFICANT CHANGE UP (ref 8.4–10.5)
CHLORIDE SERPL-SCNC: 105 MMOL/L — SIGNIFICANT CHANGE UP (ref 96–108)
CO2 SERPL-SCNC: 22 MMOL/L — SIGNIFICANT CHANGE UP (ref 22–31)
CREAT SERPL-MCNC: 0.62 MG/DL — SIGNIFICANT CHANGE UP (ref 0.5–1.3)
EOSINOPHIL # BLD AUTO: 0.1 K/UL — SIGNIFICANT CHANGE UP (ref 0–0.5)
EOSINOPHIL NFR BLD AUTO: 1.3 % — SIGNIFICANT CHANGE UP (ref 0–6)
GLUCOSE SERPL-MCNC: 105 MG/DL — HIGH (ref 70–99)
HCG SERPL-ACNC: <2 MIU/ML — SIGNIFICANT CHANGE UP
HCT VFR BLD CALC: 35.1 % — SIGNIFICANT CHANGE UP (ref 34.5–45)
HGB BLD-MCNC: 10.7 G/DL — LOW (ref 11.5–15.5)
IMM GRANULOCYTES NFR BLD AUTO: 0.1 % — SIGNIFICANT CHANGE UP (ref 0–1.5)
LYMPHOCYTES # BLD AUTO: 1.67 K/UL — SIGNIFICANT CHANGE UP (ref 1–3.3)
LYMPHOCYTES # BLD AUTO: 21.6 % — SIGNIFICANT CHANGE UP (ref 13–44)
MCHC RBC-ENTMCNC: 25.6 PG — LOW (ref 27–34)
MCHC RBC-ENTMCNC: 30.5 GM/DL — LOW (ref 32–36)
MCV RBC AUTO: 84 FL — SIGNIFICANT CHANGE UP (ref 80–100)
MONOCYTES # BLD AUTO: 0.46 K/UL — SIGNIFICANT CHANGE UP (ref 0–0.9)
MONOCYTES NFR BLD AUTO: 6 % — SIGNIFICANT CHANGE UP (ref 2–14)
NEUTROPHILS # BLD AUTO: 5.47 K/UL — SIGNIFICANT CHANGE UP (ref 1.8–7.4)
NEUTROPHILS NFR BLD AUTO: 70.7 % — SIGNIFICANT CHANGE UP (ref 43–77)
NRBC # BLD: 0 /100 WBCS — SIGNIFICANT CHANGE UP (ref 0–0)
PLATELET # BLD AUTO: 339 K/UL — SIGNIFICANT CHANGE UP (ref 150–400)
POTASSIUM SERPL-MCNC: 3.7 MMOL/L — SIGNIFICANT CHANGE UP (ref 3.5–5.3)
POTASSIUM SERPL-SCNC: 3.7 MMOL/L — SIGNIFICANT CHANGE UP (ref 3.5–5.3)
PROT SERPL-MCNC: 7.6 G/DL — SIGNIFICANT CHANGE UP (ref 6–8.3)
RBC # BLD: 4.18 M/UL — SIGNIFICANT CHANGE UP (ref 3.8–5.2)
RBC # FLD: 16 % — HIGH (ref 10.3–14.5)
SODIUM SERPL-SCNC: 140 MMOL/L — SIGNIFICANT CHANGE UP (ref 135–145)
WBC # BLD: 7.73 K/UL — SIGNIFICANT CHANGE UP (ref 3.8–10.5)
WBC # FLD AUTO: 7.73 K/UL — SIGNIFICANT CHANGE UP (ref 3.8–10.5)

## 2021-08-20 PROCEDURE — 99284 EMERGENCY DEPT VISIT MOD MDM: CPT

## 2021-08-20 PROCEDURE — 93005 ELECTROCARDIOGRAM TRACING: CPT

## 2021-08-20 PROCEDURE — 85025 COMPLETE CBC W/AUTO DIFF WBC: CPT

## 2021-08-20 PROCEDURE — 84702 CHORIONIC GONADOTROPIN TEST: CPT

## 2021-08-20 PROCEDURE — 80053 COMPREHEN METABOLIC PANEL: CPT

## 2021-08-20 NOTE — ED PROVIDER NOTE - NSCAREINITIATED _GEN_ER
ICD-10-CM    1. Diarrhea, unspecified type  R19.7     no serious findings. obtain c diff testing. take zofran as needed for nausea, vomiting. obtain c diff testing.   2. Dehydration  E86.0    3. Vomiting without nausea, intractability of vomiting not specified, unspecified vomiting type  R11.11    4. Postoperative state  Z98.890         Emilio Arrieta(Resident)

## 2021-08-20 NOTE — ED PROVIDER NOTE - NSFOLLOWUPINSTRUCTIONS_ED_ALL_ED_FT
Please follow up with your primary care physician within the next 48-72 hours.    Hypertension    WHAT YOU NEED TO KNOW:  Hypertension is high blood pressure. Your blood pressure is the force of your blood moving against the walls of your arteries. Hypertension causes your blood pressure to get so high that your heart has to work much harder than normal. This can damage your heart. The cause of hypertension may not be known. This is called essential or primary hypertension. Hypertension caused by another medical condition, such as kidney disease, is called secondary hypertension.  DISCHARGE INSTRUCTIONS:  Call 911 for any of the following:   •You have chest pain.  •You have any of the following signs of a heart attack: ?Squeezing, pressure, or pain in your chest  ?You may also have any of the following: ?Discomfort or pain in your back, neck, jaw, stomach, or arm  ?Shortness of breath  ?Nausea or vomiting  ?Lightheadedness or a sudden cold sweat  •You become confused or have difficulty speaking.  •You suddenly feel lightheaded or have trouble breathing.  Seek care immediately if:   •You have a severe headache or vision loss.  •You have weakness in an arm or leg.   Contact your healthcare provider if:   •You feel faint, dizzy, confused, or drowsy.  •You have been taking your blood pressure medicine but your pressure is higher than your provider says it should be.  •You have questions or concerns about your condition or care.  Medicines: You may need any of the following:   •Antihypertensives may be used to help lower your blood pressure. Several kinds of medicines are available. Your healthcare provider will choose medicines based on the kind of hypertension you have. You may need more than one type of medicine. Take the medicine exactly as directed.   •Diuretics help decrease extra fluid that collects in your body. This will help lower your blood pressure. You may urinate more often while you take this medicine.  •Cholesterol medicine helps lower your cholesterol level. A low cholesterol level helps prevent heart disease and makes it easier to control your blood pressure.  •Take your medicine as directed. Contact your healthcare provider if you think your medicine is not helping or if you have side effects. Tell him or her if you are allergic to any medicine. Keep a list of the medicines, vitamins, and herbs you take. Include the amounts, and when and why you take them. Bring the list or the pill bottles to follow-up visits. Carry your medicine list with you in case of an emergency.  Follow up with your healthcare provider as directed: You will need to return to have your blood pressure checked and to have other lab tests done. Write down your questions so you remember to ask them during your visits.   Stages of hypertension:   Blood Pressure Readings   •Normal blood pressure is 119/79 or lower. Your healthcare provider may only check your blood pressure each year if it stays at a normal level.  •Elevated blood pressure is 120/79 to 129/79. This is sometimes called prehypertension. Your healthcare provider may suggest lifestyle changes to help lower your blood pressure to a normal level. He or she may then check it again in 3 to 6 months,  •Stage 1 hypertension is 130/80 to 139/89. Your provider may recommend lifestyle changes, medication, and checks every 3 to 6 months until your blood pressure is controlled.  •Stage 2 hypertension is 140/90 or higher. Your provider will recommend lifestyle changes and have you take 2 kinds of hypertension medicines. You will also need to have your blood pressure checked monthly until it is controlled.  Manage hypertension:   •Check your blood pressure at home. Avoid smoking, caffeine, and exercise at least 30 minutes before checking your blood pressure. Sit and rest for 5 minutes before you take your blood pressure. Extend your arm and support it on a flat surface. Your arm should be at the same level as your heart. Follow the directions that came with your blood pressure monitor. Check your blood pressure 2 times, 1 minute apart, before you take your medicine in the morning. Also check your blood pressure before your evening meal. Keep a record of your readings and bring it to your follow-up visits. Ask your healthcare provider what your blood pressure should be.   How to take a Blood Pressure   •Manage any other health conditions you have. Health conditions such as diabetes can increase your risk for hypertension. Follow your healthcare provider's instructions and take all your medicines as directed.   •Ask about all medicines. Certain medicines can increase your blood pressure. Examples include oral birth control pills, decongestants, herbal supplements, and NSAIDs, such as ibuprofen. Your healthcare provider can tell you which medicines are safe for you to take. This includes prescription and over-the-counter medicines.  Lifestyle changes you can make to manage hypertension:   •Limit sodium (salt) as directed. Too much sodium can affect your fluid balance. Check labels to find low-sodium or no-salt-added foods. Some low-sodium foods use potassium salts for flavor. Too much potassium can also cause health problems. Your healthcare provider will tell you how much sodium and potassium are safe for you to have in a day. He or she may recommend that you limit sodium to 2,300 mg a day.  •Follow the meal plan recommended by your healthcare provider. A dietitian or your provider can give you more information on low-sodium plans or the DASH (Dietary Approaches to Stop Hypertension) eating plan. The DASH plan is low in sodium, unhealthy fats, and total fat. It is high in potassium, calcium, and fiber.   •Exercise to maintain a healthy weight. Exercise at least 30 minutes per day, on most days of the week. This will help decrease your blood pressure. Ask your healthcare provider about the best exercise plan for you.   Walking for Exercise   •Decrease stress. This may help lower your blood pressure. Learn ways to relax, such as deep breathing or listening to music.  •Limit alcohol as directed. Alcohol can increase your blood pressure. A drink of alcohol is 12 ounces of beer, 5 ounces of wine, or 1½ ounces of liquor.  •Do not smoke. Nicotine and other chemicals in cigarettes and cigars can increase your blood pressure and also cause lung damage. Ask your healthcare provider for information if you currently smoke and need help to quit. E-cigarettes or smokeless tobacco still contain nicotine. Talk to your healthcare provider before you use these products.   Prevent Heart Disease

## 2021-08-20 NOTE — ED PROVIDER NOTE - NS ED ROS FT
CONSTITUTIONAL: Lightheadedness, No fevers, no chills, no dizziness  Eyes: no visual changes  Ears: no ear drainage, no ear pain  Nose: no nasal congestion  Mouth/Throat: no sore throat  CV: No chest pain, no palpitations  PULM: No SOB, no cough  GI: No n/v/d/c, no abd pain  : no dysuria, no hematuria  SKIN: no rashes.  NEURO: no headache, no focal weakness or numbness  LYMPH/VASC: no LE swelling

## 2021-08-20 NOTE — ED PROVIDER NOTE - PATIENT PORTAL LINK FT
You can access the FollowMyHealth Patient Portal offered by [facility] by registering at the following website: [email or web address]. By joining [facility]’s FollowMyHealth portal, you will also be able to view your health information using other applications (apps) compatible with our system.

## 2021-08-20 NOTE — ED PROVIDER NOTE - NSFOLLOWUPCLINICS_GEN_ALL_ED_FT
[facility] General Internal Medicine  General Internal Medicine  [address] [id number]  Phone: [phone]  Fax:   Follow Up Time: 1-3 Days

## 2021-08-20 NOTE — ED PROVIDER NOTE - PHYSICAL EXAMINATION
gen: well appearing  Mentation: AAO x 3  psych: mood appropriate  ENT: airway patent  Eyes: conjunctivae clear bilaterally  Cardio: RRR, no m/r/g  Resp: normal BS b/l  GI: soft/nontender/nondistended  : no CVA tenderness  Neuro: sensation and motor function intact, CN 2-12 intact  Skin: No evidence of rash  MSK: normal movement of all extremities  Lymph/Vasc: no LE edema

## 2021-08-20 NOTE — ED ADULT NURSE NOTE - NS ED NOTE ABUSE RESPONSE YN
Patient seen for discharge to review the AVS instructions and Vision resources sheet in [place]. The patient has been to the lab and will go now to registration to schedule a pap appointment at a [facility] pap clinic and a 6 week provider follow-up appointment.  Isabella Hebert RN Yes

## 2021-08-20 NOTE — ED PROVIDER NOTE - CLINICAL SUMMARY MEDICAL DECISION MAKING FREE TEXT BOX
46 year old female with no PMHx presenting with HTN and lightheadedness. Concerned for potential end organ dysfunction secondary to HTN. Will get labs, urine hCG, and ECG. 46 year old female with no PMHx presenting with HTN and lightheadedness. Will send labs to r/o potential end organ dysfunction secondary to HTN vs anemia or other acute electrolyte abnl leading to her symptoms. Will get labs, urine hCG, and ECG. low concern for CVA or infection at this time.     Labs unremarkable. ekg benign. pt well appearing. Multiple diagnoses were considered during patient's evaluation today. While exact etiology of symptoms is unclear, there appears to be no emergent process today that would require further emergent or inpatient management. Pt is safe for dc with outpt f/u and return instructions if symptoms worsen.

## 2021-08-20 NOTE — ED PROVIDER NOTE - OBJECTIVE STATEMENT
46 year old female with no PMHx presenting with HTN since Monday. She began feeling lightheaded on Monday and decided to take her BP with her 's BP monitor. She has had elevated BP since Monday with the highest recording systolic BP of 148 mmHg. She denies headache, CP, SOB, swelling of the extremities, or visual changes.

## 2021-08-20 NOTE — ED ADULT NURSE NOTE - OBJECTIVE STATEMENT
Pt is a 46y Female c/o HTN and dizziness for the past 3 days. Pt states she took her blood pressure today because of the persistent dizziness. Pt denies any PMHx. Pt denies cough, CP, SOB, fever, chills. Pt denies any daily medications. Pt did not take any medications the past 3 days and has not f/u with her PCP for the dizziness. pt describes the dizziness to feel like light headdedness. Pt prefers to go by Iris and uses She/Her/Hers pronouns. Pt resting comfortably in bed. Pt educated on call bell use and call bell placed at bedside. Pt safety maintained.

## 2021-08-24 ENCOUNTER — APPOINTMENT (OUTPATIENT)
Dept: INTERNAL MEDICINE | Facility: CLINIC | Age: 46
End: 2021-08-24
Payer: MEDICAID

## 2021-08-24 VITALS
DIASTOLIC BLOOD PRESSURE: 84 MMHG | OXYGEN SATURATION: 97 % | WEIGHT: 178 LBS | SYSTOLIC BLOOD PRESSURE: 130 MMHG | TEMPERATURE: 96.9 F | HEIGHT: 66 IN | RESPIRATION RATE: 16 BRPM | BODY MASS INDEX: 28.61 KG/M2 | HEART RATE: 87 BPM

## 2021-08-24 PROCEDURE — 99213 OFFICE O/P EST LOW 20 MIN: CPT

## 2021-08-24 NOTE — HISTORY OF PRESENT ILLNESS
[de-identified] : 46 year old  female patient with history of stable Iron Deficiency Anemia, Vitamin D Deficiency, Elevated Hemoglobin A1c, Chronic Back Pain, Renal Stone, history as stated, presented for follow up examination. Patient is compliant with all medications. Denies shortness of breath, chest pain or abdominal pains at this time. ROS as stated.\par

## 2021-08-24 NOTE — ASSESSMENT
[FreeTextEntry1] : 46 year old female found to have stable Iron Deficiency Anemia, Vitamin D Deficiency, Elevated Hemoglobin A1c, Chronic Back Pain, Renal Stone,with the current prescription regimen as recommended, diet and life style modifications, as counseled. Prior results reviewed, interpreted and discussed with the patient during today's examination, as appropriate. Follow up, treatment plan and tests, as ordered.\par \par Patient was recently evaluated in ER, findings and recommendations reviewed with the patient during today's examination.\par \par Total time spent : 25 minutes\par Including:\par Preparation prior to visit - Reviewing prior record, results of tests and Consultation Reports as applicable\par Conducting an appropriate H & P during today's encounter\par Appropriate orders for tests, medications and procedures, as applicable\par Counseling patient \par Note completion\par

## 2021-09-13 ENCOUNTER — APPOINTMENT (OUTPATIENT)
Dept: INTERNAL MEDICINE | Facility: CLINIC | Age: 46
End: 2021-09-13
Payer: MEDICAID

## 2021-09-13 VITALS
OXYGEN SATURATION: 98 % | RESPIRATION RATE: 16 BRPM | HEART RATE: 80 BPM | BODY MASS INDEX: 28.45 KG/M2 | SYSTOLIC BLOOD PRESSURE: 151 MMHG | DIASTOLIC BLOOD PRESSURE: 88 MMHG | HEIGHT: 66 IN | TEMPERATURE: 98.2 F | WEIGHT: 177 LBS

## 2021-09-13 PROCEDURE — 99213 OFFICE O/P EST LOW 20 MIN: CPT

## 2021-09-13 RX ORDER — AMOXICILLIN 500 MG/1
500 CAPSULE ORAL
Qty: 21 | Refills: 0 | Status: DISCONTINUED | COMMUNITY
Start: 2021-04-15

## 2021-09-13 RX ORDER — MELOXICAM 7.5 MG/1
7.5 TABLET ORAL DAILY
Qty: 30 | Refills: 1 | Status: DISCONTINUED | COMMUNITY
Start: 2019-01-10 | End: 2021-09-13

## 2021-09-13 RX ORDER — CHLORHEXIDINE GLUCONATE, 0.12% ORAL RINSE 1.2 MG/ML
0.12 SOLUTION DENTAL
Qty: 473 | Refills: 0 | Status: DISCONTINUED | COMMUNITY
Start: 2021-04-15

## 2021-09-13 RX ORDER — CLOTRIMAZOLE 10 MG/ML
1 SOLUTION TOPICAL
Qty: 30 | Refills: 0 | Status: DISCONTINUED | COMMUNITY
Start: 2021-04-15

## 2021-09-13 RX ORDER — TERBINAFINE HYDROCHLORIDE 250 MG/1
250 TABLET ORAL DAILY
Refills: 0 | Status: DISCONTINUED | COMMUNITY
Start: 2020-11-11 | End: 2021-09-13

## 2021-09-13 RX ORDER — DICLOFENAC SODIUM 50 MG/1
50 TABLET, DELAYED RELEASE ORAL
Qty: 60 | Refills: 0 | Status: DISCONTINUED | COMMUNITY
Start: 2019-01-18 | End: 2021-09-13

## 2021-09-13 NOTE — HISTORY OF PRESENT ILLNESS
[de-identified] : 46 year old  female patient with history of stable Hypertension, Iron Deficiency Anemia, Vitamin D Deficiency, Elevated Hemoglobin A1c, Chronic Back Pain, Renal Stone, history as stated, presented for follow up examination. Patient is compliant with all medications. Denies shortness of breath, chest pain or abdominal pains at this time. ROS as stated.\par

## 2021-09-13 NOTE — ASSESSMENT
[FreeTextEntry1] : 46 year old female found to have stable Hypertension, Iron Deficiency Anemia, Vitamin D Deficiency, Elevated Hemoglobin A1c, Chronic Back Pain, Renal Stone,with the current prescription regimen as recommended, diet and life style modifications, as counseled. Prior results reviewed, interpreted and discussed with the patient during today's examination, as appropriate. Follow up, treatment plan and tests, as ordered.\par \par Total time spent : 25 minutes\par Including:\par Preparation prior to visit - Reviewing prior record, results of tests and Consultation Reports as applicable\par Conducting an appropriate H & P during today's encounter\par Appropriate orders for tests, medications and procedures, as applicable\par Counseling patient \par Note completion\par

## 2021-09-13 NOTE — HEALTH RISK ASSESSMENT
[No] : In the past 12 months have you used drugs other than those required for medical reasons? No [No falls in past year] : Patient reported no falls in the past year [0] : 2) Feeling down, depressed, or hopeless: Not at all (0) [] : No [de-identified] : None [[id number]] : 0

## 2021-09-15 LAB
25(OH)D3 SERPL-MCNC: 28 NG/ML
ALBUMIN SERPL ELPH-MCNC: 4.3 G/DL
ALP BLD-CCNC: 77 U/L
ALT SERPL-CCNC: 15 U/L
ANION GAP SERPL CALC-SCNC: 10 MMOL/L
APPEARANCE: CLEAR
AST SERPL-CCNC: 18 U/L
BASOPHILS # BLD AUTO: 0.02 K/UL
BASOPHILS NFR BLD AUTO: 0.4 %
BILIRUB SERPL-MCNC: 0.2 MG/DL
BILIRUBIN URINE: NEGATIVE
BLOOD URINE: NEGATIVE
BUN SERPL-MCNC: 16 MG/DL
CALCIUM SERPL-MCNC: 9.4 MG/DL
CHLORIDE SERPL-SCNC: 106 MMOL/L
CHOLEST SERPL-MCNC: 180 MG/DL
CO2 SERPL-SCNC: 23 MMOL/L
COLOR: NORMAL
CREAT SERPL-MCNC: 0.65 MG/DL
CREAT SPEC-SCNC: 80 MG/DL
EOSINOPHIL # BLD AUTO: 0.05 K/UL
EOSINOPHIL NFR BLD AUTO: 1 %
ERYTHROCYTE [SEDIMENTATION RATE] IN BLOOD BY WESTERGREN METHOD: 25 MM/HR
ESTIMATED AVERAGE GLUCOSE: 114 MG/DL
FOLATE SERPL-MCNC: 14.7 NG/ML
GGT SERPL-CCNC: 14 U/L
GLUCOSE QUALITATIVE U: NEGATIVE
GLUCOSE SERPL-MCNC: 92 MG/DL
HBA1C MFR BLD HPLC: 5.6 %
HCT VFR BLD CALC: 37.3 %
HDLC SERPL-MCNC: 50 MG/DL
HGB BLD-MCNC: 11.4 G/DL
IMM GRANULOCYTES NFR BLD AUTO: 0.2 %
IRON SATN MFR SERPL: 10 %
IRON SERPL-MCNC: 45 UG/DL
KETONES URINE: NEGATIVE
LDLC SERPL CALC-MCNC: 116 MG/DL
LEUKOCYTE ESTERASE URINE: NEGATIVE
LYMPHOCYTES # BLD AUTO: 1.37 K/UL
LYMPHOCYTES NFR BLD AUTO: 27.8 %
MAN DIFF?: NORMAL
MCHC RBC-ENTMCNC: 27 PG
MCHC RBC-ENTMCNC: 30.6 GM/DL
MCV RBC AUTO: 88.2 FL
MICROALBUMIN 24H UR DL<=1MG/L-MCNC: 1.4 MG/DL
MICROALBUMIN/CREAT 24H UR-RTO: 17 MG/G
MONOCYTES # BLD AUTO: 0.31 K/UL
MONOCYTES NFR BLD AUTO: 6.3 %
NEUTROPHILS # BLD AUTO: 3.16 K/UL
NEUTROPHILS NFR BLD AUTO: 64.3 %
NITRITE URINE: NEGATIVE
NONHDLC SERPL-MCNC: 130 MG/DL
PH URINE: 6
PLATELET # BLD AUTO: 296 K/UL
POTASSIUM SERPL-SCNC: 4.9 MMOL/L
PROT SERPL-MCNC: 6.8 G/DL
PROTEIN URINE: NEGATIVE
RBC # BLD: 4.23 M/UL
RBC # FLD: 17.4 %
SODIUM SERPL-SCNC: 140 MMOL/L
SPECIFIC GRAVITY URINE: 1.02
T3 SERPL-MCNC: 108 NG/DL
T4 FREE SERPL-MCNC: 1 NG/DL
TIBC SERPL-MCNC: 430 UG/DL
TRIGL SERPL-MCNC: 69 MG/DL
TSH SERPL-ACNC: 1.36 UIU/ML
UIBC SERPL-MCNC: 385 UG/DL
UROBILINOGEN URINE: NORMAL
VIT B12 SERPL-MCNC: 1088 PG/ML
WBC # FLD AUTO: 4.92 K/UL

## 2022-03-10 ENCOUNTER — APPOINTMENT (OUTPATIENT)
Dept: INTERNAL MEDICINE | Facility: CLINIC | Age: 47
End: 2022-03-10
Payer: MEDICAID

## 2022-03-10 VITALS
WEIGHT: 182 LBS | SYSTOLIC BLOOD PRESSURE: 150 MMHG | TEMPERATURE: 96.1 F | RESPIRATION RATE: 16 BRPM | OXYGEN SATURATION: 99 % | HEIGHT: 66 IN | DIASTOLIC BLOOD PRESSURE: 90 MMHG | HEART RATE: 75 BPM | BODY MASS INDEX: 29.25 KG/M2

## 2022-03-10 PROCEDURE — 99214 OFFICE O/P EST MOD 30 MIN: CPT

## 2022-03-10 NOTE — HISTORY OF PRESENT ILLNESS
[de-identified] : 47 year old  female patient with history of stable Hypertension, Iron Deficiency Anemia, Vitamin D Deficiency, Elevated Hemoglobin A1c, Chronic Back Pain, Renal Stone, history as stated, presented for follow up examination. Patient is compliant with all medications. Denies shortness of breath, chest pain or abdominal pains at this time. ROS as stated.\par

## 2022-03-10 NOTE — HEALTH RISK ASSESSMENT
[Never] : Never [No] : In the past 12 months have you used drugs other than those required for medical reasons? No [No falls in past year] : Patient reported no falls in the past year [0] : 2) Feeling down, depressed, or hopeless: Not at all (0) [de-identified] : None [[id number]] : 0

## 2022-03-10 NOTE — ASSESSMENT
[FreeTextEntry1] : 47 year old female found to have an elevated Hypertension, Losartan as Rxed, stable Iron Deficiency Anemia, Vitamin D Deficiency, Elevated Hemoglobin A1c, Chronic Back Pain, Renal Stone,with the current prescription regimen as recommended, diet and life style modifications, as counseled. Prior results reviewed, interpreted and discussed with the patient during today's examination, as appropriate. Follow up, treatment plan and tests, as ordered.\par \par Total time spent : 30 minutes\par Including:\par Preparation prior to visit - Reviewing prior record, results of tests and Consultation Reports as applicable\par Conducting an appropriate H & P during today's encounter\par Appropriate orders for tests, medications and procedures, as applicable\par Counseling patient \par Note completion\par

## 2022-03-11 LAB
ALBUMIN SERPL ELPH-MCNC: 4.3 G/DL
ALP BLD-CCNC: 79 U/L
ALT SERPL-CCNC: 21 U/L
ANION GAP SERPL CALC-SCNC: 10 MMOL/L
AST SERPL-CCNC: 20 U/L
BASOPHILS # BLD AUTO: 0.01 K/UL
BASOPHILS NFR BLD AUTO: 0.3 %
BILIRUB SERPL-MCNC: 0.2 MG/DL
BUN SERPL-MCNC: 12 MG/DL
CALCIUM SERPL-MCNC: 9.1 MG/DL
CHLORIDE SERPL-SCNC: 106 MMOL/L
CHOLEST SERPL-MCNC: 190 MG/DL
CO2 SERPL-SCNC: 25 MMOL/L
CREAT SERPL-MCNC: 0.6 MG/DL
EGFR: 110 ML/MIN/1.73M2
EOSINOPHIL # BLD AUTO: 0.04 K/UL
EOSINOPHIL NFR BLD AUTO: 1.2 %
ESTIMATED AVERAGE GLUCOSE: 108 MG/DL
GGT SERPL-CCNC: 13 U/L
GLUCOSE SERPL-MCNC: 88 MG/DL
HBA1C MFR BLD HPLC: 5.4 %
HCT VFR BLD CALC: 39 %
HDLC SERPL-MCNC: 58 MG/DL
HGB BLD-MCNC: 11.9 G/DL
IMM GRANULOCYTES NFR BLD AUTO: 0 %
IRON SATN MFR SERPL: 7 %
IRON SERPL-MCNC: 35 UG/DL
LDLC SERPL CALC-MCNC: 117 MG/DL
LYMPHOCYTES # BLD AUTO: 1 K/UL
LYMPHOCYTES NFR BLD AUTO: 29.2 %
MAN DIFF?: NORMAL
MCHC RBC-ENTMCNC: 28.5 PG
MCHC RBC-ENTMCNC: 30.5 GM/DL
MCV RBC AUTO: 93.5 FL
MONOCYTES # BLD AUTO: 0.27 K/UL
MONOCYTES NFR BLD AUTO: 7.9 %
NEUTROPHILS # BLD AUTO: 2.1 K/UL
NEUTROPHILS NFR BLD AUTO: 61.4 %
NONHDLC SERPL-MCNC: 133 MG/DL
PLATELET # BLD AUTO: 297 K/UL
POTASSIUM SERPL-SCNC: 4.9 MMOL/L
PROT SERPL-MCNC: 6.9 G/DL
RBC # BLD: 4.17 M/UL
RBC # FLD: 14.7 %
SODIUM SERPL-SCNC: 141 MMOL/L
TIBC SERPL-MCNC: 473 UG/DL
TRIGL SERPL-MCNC: 77 MG/DL
UIBC SERPL-MCNC: 438 UG/DL
WBC # FLD AUTO: 3.42 K/UL

## 2022-04-12 ENCOUNTER — APPOINTMENT (OUTPATIENT)
Dept: INTERNAL MEDICINE | Facility: CLINIC | Age: 47
End: 2022-04-12
Payer: MEDICAID

## 2022-04-12 VITALS
SYSTOLIC BLOOD PRESSURE: 128 MMHG | HEART RATE: 72 BPM | HEIGHT: 66 IN | TEMPERATURE: 98 F | OXYGEN SATURATION: 99 % | DIASTOLIC BLOOD PRESSURE: 82 MMHG | BODY MASS INDEX: 29.25 KG/M2 | WEIGHT: 182 LBS | RESPIRATION RATE: 16 BRPM

## 2022-04-12 PROCEDURE — 99214 OFFICE O/P EST MOD 30 MIN: CPT

## 2022-04-12 NOTE — ASSESSMENT
[FreeTextEntry1] : 47 year old female found to have stable Hypertension, Iron Deficiency Anemia, Vitamin D Deficiency, Elevated Hemoglobin A1c, Chronic Back Pain, Renal Stone,with the current prescription regimen as recommended, diet and life style modifications, as counseled. Prior results reviewed, interpreted and discussed with the patient during today's examination, as appropriate. Follow up, treatment plan and tests, as ordered.\par \par Total time spent : 30 minutes\par Including:\par Preparation prior to visit - Reviewing prior record, results of tests and Consultation Reports as applicable\par Conducting an appropriate H & P during today's encounter\par Appropriate orders for tests, medications and procedures, as applicable\par Counseling patient \par Note completion\par

## 2022-04-12 NOTE — HISTORY OF PRESENT ILLNESS
[de-identified] : 47 year old  female patient with history of stable Hypertension, Iron Deficiency Anemia, Vitamin D Deficiency, Elevated Hemoglobin A1c, Chronic Back Pain, Renal Stone, history as stated, presented for follow up examination. Patient is compliant with all medications. Denies shortness of breath, chest pain or abdominal pains at this time. ROS as stated.\par

## 2022-04-12 NOTE — HEALTH RISK ASSESSMENT
[Never] : Never [No] : In the past 12 months have you used drugs other than those required for medical reasons? No [No falls in past year] : Patient reported no falls in the past year [0] : 2) Feeling down, depressed, or hopeless: Not at all (0) [de-identified] : None [[id number]] : 0

## 2022-04-18 ENCOUNTER — APPOINTMENT (OUTPATIENT)
Dept: ULTRASOUND IMAGING | Facility: HOSPITAL | Age: 47
End: 2022-04-18
Payer: MEDICAID

## 2022-04-18 ENCOUNTER — OUTPATIENT (OUTPATIENT)
Dept: OUTPATIENT SERVICES | Facility: HOSPITAL | Age: 47
LOS: 1 days | End: 2022-04-18
Payer: MEDICAID

## 2022-04-18 DIAGNOSIS — M54.9 DORSALGIA, UNSPECIFIED: ICD-10-CM

## 2022-04-18 PROCEDURE — 76700 US EXAM ABDOM COMPLETE: CPT | Mod: 26

## 2022-04-18 PROCEDURE — 76700 US EXAM ABDOM COMPLETE: CPT

## 2022-05-27 ENCOUNTER — EMERGENCY (EMERGENCY)
Facility: HOSPITAL | Age: 47
LOS: 1 days | Discharge: HOME | End: 2022-05-27
Attending: STUDENT IN AN ORGANIZED HEALTH CARE EDUCATION/TRAINING PROGRAM
Payer: MEDICAID

## 2022-05-27 VITALS
TEMPERATURE: 98 F | RESPIRATION RATE: 18 BRPM | SYSTOLIC BLOOD PRESSURE: 138 MMHG | HEIGHT: 66 IN | WEIGHT: 179.9 LBS | OXYGEN SATURATION: 100 % | HEART RATE: 92 BPM | DIASTOLIC BLOOD PRESSURE: 91 MMHG

## 2022-05-27 PROCEDURE — 99285 EMERGENCY DEPT VISIT HI MDM: CPT

## 2022-05-27 NOTE — ED ADULT TRIAGE NOTE - CHIEF COMPLAINT QUOTE
noted hematuria tonight; constant burning to perineal area; no blood thinners; pt reports hx kidney stone

## 2022-05-28 VITALS
HEART RATE: 64 BPM | TEMPERATURE: 98 F | RESPIRATION RATE: 14 BRPM | SYSTOLIC BLOOD PRESSURE: 105 MMHG | DIASTOLIC BLOOD PRESSURE: 68 MMHG | OXYGEN SATURATION: 98 %

## 2022-05-28 LAB
ALBUMIN SERPL ELPH-MCNC: 4 G/DL — SIGNIFICANT CHANGE UP (ref 3.3–5)
ALP SERPL-CCNC: 103 U/L — SIGNIFICANT CHANGE UP (ref 40–120)
ALT FLD-CCNC: 17 U/L — SIGNIFICANT CHANGE UP (ref 10–45)
ANION GAP SERPL CALC-SCNC: 10 MMOL/L — SIGNIFICANT CHANGE UP (ref 5–17)
APPEARANCE UR: ABNORMAL
AST SERPL-CCNC: 28 U/L — SIGNIFICANT CHANGE UP (ref 10–40)
BACTERIA # UR AUTO: NEGATIVE — SIGNIFICANT CHANGE UP
BASOPHILS # BLD AUTO: 0.02 K/UL — SIGNIFICANT CHANGE UP (ref 0–0.2)
BASOPHILS NFR BLD AUTO: 0.3 % — SIGNIFICANT CHANGE UP (ref 0–2)
BILIRUB SERPL-MCNC: 0.1 MG/DL — LOW (ref 0.2–1.2)
BILIRUB UR-MCNC: NEGATIVE — SIGNIFICANT CHANGE UP
BUN SERPL-MCNC: 18 MG/DL — SIGNIFICANT CHANGE UP (ref 7–23)
CALCIUM SERPL-MCNC: 9.6 MG/DL — SIGNIFICANT CHANGE UP (ref 8.4–10.5)
CHLORIDE SERPL-SCNC: 105 MMOL/L — SIGNIFICANT CHANGE UP (ref 96–108)
CO2 SERPL-SCNC: 24 MMOL/L — SIGNIFICANT CHANGE UP (ref 22–31)
COLOR SPEC: SIGNIFICANT CHANGE UP
CREAT SERPL-MCNC: 0.66 MG/DL — SIGNIFICANT CHANGE UP (ref 0.5–1.3)
DIFF PNL FLD: ABNORMAL
EGFR: 107 ML/MIN/1.73M2 — SIGNIFICANT CHANGE UP
EOSINOPHIL # BLD AUTO: 0.06 K/UL — SIGNIFICANT CHANGE UP (ref 0–0.5)
EOSINOPHIL NFR BLD AUTO: 1 % — SIGNIFICANT CHANGE UP (ref 0–6)
EPI CELLS # UR: 2 /HPF — SIGNIFICANT CHANGE UP
GLUCOSE SERPL-MCNC: 108 MG/DL — HIGH (ref 70–99)
GLUCOSE UR QL: NEGATIVE — SIGNIFICANT CHANGE UP
HCT VFR BLD CALC: 31.2 % — LOW (ref 34.5–45)
HGB BLD-MCNC: 9.6 G/DL — LOW (ref 11.5–15.5)
HYALINE CASTS # UR AUTO: 3 /LPF — HIGH (ref 0–2)
IMM GRANULOCYTES NFR BLD AUTO: 0.2 % — SIGNIFICANT CHANGE UP (ref 0–1.5)
KETONES UR-MCNC: NEGATIVE — SIGNIFICANT CHANGE UP
LEUKOCYTE ESTERASE UR-ACNC: ABNORMAL
LYMPHOCYTES # BLD AUTO: 1.29 K/UL — SIGNIFICANT CHANGE UP (ref 1–3.3)
LYMPHOCYTES # BLD AUTO: 22.6 % — SIGNIFICANT CHANGE UP (ref 13–44)
MCHC RBC-ENTMCNC: 28.3 PG — SIGNIFICANT CHANGE UP (ref 27–34)
MCHC RBC-ENTMCNC: 30.8 GM/DL — LOW (ref 32–36)
MCV RBC AUTO: 92 FL — SIGNIFICANT CHANGE UP (ref 80–100)
MONOCYTES # BLD AUTO: 0.31 K/UL — SIGNIFICANT CHANGE UP (ref 0–0.9)
MONOCYTES NFR BLD AUTO: 5.4 % — SIGNIFICANT CHANGE UP (ref 2–14)
NEUTROPHILS # BLD AUTO: 4.03 K/UL — SIGNIFICANT CHANGE UP (ref 1.8–7.4)
NEUTROPHILS NFR BLD AUTO: 70.5 % — SIGNIFICANT CHANGE UP (ref 43–77)
NITRITE UR-MCNC: NEGATIVE — SIGNIFICANT CHANGE UP
NRBC # BLD: 0 /100 WBCS — SIGNIFICANT CHANGE UP (ref 0–0)
PH UR: 5.5 — SIGNIFICANT CHANGE UP (ref 5–8)
PLATELET # BLD AUTO: 307 K/UL — SIGNIFICANT CHANGE UP (ref 150–400)
POTASSIUM SERPL-MCNC: 4.3 MMOL/L — SIGNIFICANT CHANGE UP (ref 3.5–5.3)
POTASSIUM SERPL-SCNC: 4.3 MMOL/L — SIGNIFICANT CHANGE UP (ref 3.5–5.3)
PROT SERPL-MCNC: 6.9 G/DL — SIGNIFICANT CHANGE UP (ref 6–8.3)
PROT UR-MCNC: ABNORMAL
RBC # BLD: 3.39 M/UL — LOW (ref 3.8–5.2)
RBC # FLD: 13.6 % — SIGNIFICANT CHANGE UP (ref 10.3–14.5)
RBC CASTS # UR COMP ASSIST: 429 /HPF — HIGH (ref 0–4)
SODIUM SERPL-SCNC: 139 MMOL/L — SIGNIFICANT CHANGE UP (ref 135–145)
SP GR SPEC: 1.02 — SIGNIFICANT CHANGE UP (ref 1.01–1.02)
UROBILINOGEN FLD QL: NEGATIVE — SIGNIFICANT CHANGE UP
WBC # BLD: 5.72 K/UL — SIGNIFICANT CHANGE UP (ref 3.8–10.5)
WBC # FLD AUTO: 5.72 K/UL — SIGNIFICANT CHANGE UP (ref 3.8–10.5)
WBC UR QL: 27 /HPF — HIGH (ref 0–5)

## 2022-05-28 PROCEDURE — 80053 COMPREHEN METABOLIC PANEL: CPT

## 2022-05-28 PROCEDURE — 81001 URINALYSIS AUTO W/SCOPE: CPT

## 2022-05-28 PROCEDURE — 36415 COLL VENOUS BLD VENIPUNCTURE: CPT

## 2022-05-28 PROCEDURE — 87086 URINE CULTURE/COLONY COUNT: CPT

## 2022-05-28 PROCEDURE — 76770 US EXAM ABDO BACK WALL COMP: CPT

## 2022-05-28 PROCEDURE — 76770 US EXAM ABDO BACK WALL COMP: CPT | Mod: 26

## 2022-05-28 PROCEDURE — 99284 EMERGENCY DEPT VISIT MOD MDM: CPT | Mod: 25

## 2022-05-28 PROCEDURE — 85025 COMPLETE CBC W/AUTO DIFF WBC: CPT

## 2022-05-28 PROCEDURE — 87186 SC STD MICRODIL/AGAR DIL: CPT

## 2022-05-28 NOTE — ED ADULT NURSE NOTE - OBJECTIVE STATEMENT
Pt presents with c/o hematuria since yesterday evening with right flank pain, PMH HTN and kidney stone

## 2022-05-28 NOTE — ED PROVIDER NOTE - NSFOLLOWUPCLINICSTOKEN_GEN_ALL_ED_FT
Katelynn Nugent is a 63 year old female who is here today for a preoperative exam at the request of Dr. Tj Valentin.  This is for Left CTS surgery on 2020 and then right CTS surgery on 2020  She has tingling and pain in both arms especially on the left.  She is right handed.  She has failed conservative treatment and opting for surgery  She is on Celexa and rare use of Xanax and is doing well in that regard  Her last surgery, she did have some nausea from the anesthesia but otherwise never had any problems.  No history of blood clots    ALLERGIES:   Allergen Reactions   • Codeine PRURITUS     Itchy skin   • Morphine PRURITUS       Current Outpatient Medications   Medication Sig Dispense Refill   • citalopram (CeleXA) 20 MG tablet TAKE 1 TABLET BY MOUTH  DAILY 90 tablet 0   • ALPRAZolam (XANAX) 0.5 MG tablet Take 1 tablet by mouth 3 times daily. (Patient taking differently: Take 0.5 mg by mouth 3 times daily as needed. ) 30 tablet 0   • meloxicam (MOBIC) 15 MG tablet TAKE ONE-HALF TO 1 TABLET  BY MOUTH DAILY 90 tablet 0   • loratadine-pseudoephedrine (CLARITIN-D 24 HOUR)  MG per 24 hr tablet Take 1 tablet by mouth daily. 90 tablet 1     No current facility-administered medications for this visit.        Past Medical History:   Diagnosis Date   • Anxiety    • PONV (postoperative nausea and vomiting)        Past Surgical History:   Procedure Laterality Date   • Brow lift and blepharoplasty Bilateral 2014   •  delivery+postpartum care  x4       • Colonoscopy diagnostic  10    Dr. Hoover F/U 10 years   • Total abdom hysterectomy  age 38    CLIFF with Ovaries Intact       Social History     Socioeconomic History   • Marital status: /Civil Union     Spouse name: Not on file   • Number of children: Not on file   • Years of education: Not on file   • Highest education level: Not on file   Occupational History   • Not on file   Social Needs   • Financial resource strain: Not  on file   • Food insecurity     Worry: Not on file     Inability: Not on file   • Transportation needs     Medical: Not on file     Non-medical: Not on file   Tobacco Use   • Smoking status: Never Smoker   • Smokeless tobacco: Never Used   Substance and Sexual Activity   • Alcohol use: Yes     Alcohol/week: 2.0 - 3.0 standard drinks     Types: 2 - 3 Standard drinks or equivalent per week   • Drug use: No   • Sexual activity: Yes     Partners: Male     Birth control/protection: Surgical     Comment: , Jayden, homemaker   Lifestyle   • Physical activity     Days per week: Not on file     Minutes per session: Not on file   • Stress: Not on file   Relationships   • Social connections     Talks on phone: Not on file     Gets together: Not on file     Attends Oriental orthodox service: Not on file     Active member of club or organization: Not on file     Attends meetings of clubs or organizations: Not on file     Relationship status: Not on file   • Intimate partner violence     Fear of current or ex partner: Not on file     Emotionally abused: Not on file     Physically abused: Not on file     Forced sexual activity: Not on file   Other Topics Concern   • Not on file   Social History Narrative    [place] from home    [place]       Family History   Problem Relation Age of Onset   • Diabetes Father    • Heart Father         age 86        REVIEW OF SYSTEMS:  Eye Problem(s): Negative  ENT Problem(s): Negative  Cardiovascular problem(s): Negative  Respiratory problem(s): Negative  Gastrointestinal problem(s): Negative GI (gastrointestinal)  Genitourinary problem(s): Negative  Musculoskeletal problem(s): Negative other than in HPI  Integumentary problem(s): Negative  Neurological problem(s): Negative  Psychiatric problem(s): Negative  Endocrine problem(s): Negative  Hematologic and/or Lymphatic problem(s): Negative    PHYSICAL EXAM:  GENERAL APPEARANCE: alert and in no distress  HEAD: Negative.  EYES: PERRLA (pupils  equal, round, and reactive to light and accommodation). EOMI (extraocular movements are intact).  MOUTH: normal  THROAT: oropharynx clear  NECK: supple without meningismus  CHEST: symmetrical  LUNGS: clear to auscultation  HEART: normal, regular rate and rhythm, no murmur noted  ABDOMEN: soft without masses, tenderness, guarding or organomegaly  EXTREMITIES: Full ROM (range of motion), without swelling or tenderness.  SKIN: skin color, texture and turgor are normal  NEUROLOGIC: normal,  no focal deficit noted      ASSESSMENT:  1. Preop examination    2. Left carpal tunnel syndrome    3. Carpal tunnel syndrome of right wrist        PLAN:  Overall, the patient has terrible symptoms of carpal tunnel in both hands  Left arm is worse  At this point she is opting for surgery.  She is scheduled for the 1st 1 tomorrow and the 2nd 1 on September 25th  In general she is otherwise very healthy.  She has no known lung heart or neurologic disease  We will obtain preop workup and barring any significant abnormalities, she will be an acceptable risk for the upcoming surgery      Carbon Copy:  Dr. Valentin    Addendum:  CBCs unremarkable  BMP is unremarkable  EKG is normal  Urinalysis is normal other than trace blood  In my opinion, she is cleared for the above surgery     [id number]: || ||00\01||False;

## 2022-05-28 NOTE — ED PROVIDER NOTE - PROGRESS NOTE DETAILS
Marco Howell PGY2: UA w/ many RBCs and US w/ obstructing L kidney stone and mild hydronephrosis.  Labs unremarkable. Pt was re-evaluated at bedside, VSS, feeling better overall, tolerating PO. Results were discussed with patient as well as return precautions and follow up plan with PCP and/or urologist. Time was taken to answer any questions that the patient had before providing them with discharge paperwork.

## 2022-05-28 NOTE — ED PROVIDER NOTE - NS ED ROS FT
Gen: Denies fever, weight loss  HEENT: Denies vision changes, ear pain, epistaxis, sore throat  CV: Denies chest pain, palpitations  Skin: Denies rash, erythema, color changes  Resp: Denies SOB, cough  Endo: Denies sensitivity to heat, cold, increased urination  GI: Denies constipation, nausea, vomiting, diarrhea; +flank pain  Msk: Denies back pain, LE swelling, extremity pain  : Denies increased frequency; +dysuria, +hematuria  Neuro: Denies LOC, weakness, numbness, tingling  Psych: Denies hx of psych, hallucinations  ROS statement: all other ROS negative except as per HPI

## 2022-05-28 NOTE — ED PROVIDER NOTE - PATIENT PORTAL LINK FT
You can access the FollowMyHealth Patient Portal offered by [facility] by registering at the following website: [email or web address]. By joining [facility]’s FollowMyHealth portal, you will also be able to view your health information using other applications (apps) compatible with our system.

## 2022-05-28 NOTE — ED PROVIDER NOTE - NSFOLLOWUPINSTRUCTIONS_ED_ALL_ED_FT
You have been diagnosed with a kidney stone. To control the pain, you should take Ibuprofen 400 mg along with Tylenol 650mg every 6 hours. These are both over the counter medications that you can  at your local pharmacy without a prescription. If you are still having severe pain in 48 hours you should return to the emergency room or a urologist if able. If you began having fever, uncontrollable nausea and vomiting then you also need to come back to the ED.

## 2022-05-28 NOTE — ED PROVIDER NOTE - NS_EDPROVIDERDISPOUSERTYPE_ED_A_ED
I have reviewed the notes, assessments, and/or procedures performed by Charles Olivares MD, I concur with her/his documentation and assessment and plan for Nirav Lind.                This document has been electronically signed by Donaldo Nolan MD on Blessing 3, 2021 16:34 CDT     Attending Attestation (For Attendings USE Only)...

## 2022-05-28 NOTE — ED PROVIDER NOTE - CLINICAL SUMMARY MEDICAL DECISION MAKING FREE TEXT BOX
48 y/o F, PMH of HTN and kidney stone, presents to ED c/o hematuria, dysuria, and R flank pain x 1 day. Pt denies f/c, CP, SOB, urinary frequency, n/v/d.  VSS. Physical exam significant for mild RLQ ttp w/o CVAT.  Plan to check basic labs, UA, and kidney US. Reassess.

## 2022-05-28 NOTE — ED PROVIDER NOTE - PHYSICAL EXAMINATION
PHYSICAL EXAM:  GENERAL: non-toxic appearing; in no respiratory distress  HEENT: Atraumatic, Normocephalic, PERRL, EOMs intact b/l w/out deficits, no conjunctival pallor, MMM  NECK: No JVD; FROM  CHEST/LUNG: CTAB no wheezes/rhonchi/rales  HEART: RRR no murmur/gallops/rubs  ABDOMEN: +BS, soft, ND, mild RLQ ttp, no CVAT  EXTREMITIES: No LE edema, +2 radial pulses b/l, +2 DP/PT pulses b/l  MUSCULOSKELETAL: FROM of all 4 extremities  NERVOUS SYSTEM:  A&Ox3, No motor deficits or sensory deficits; no focal neurologic deficits  SKIN:  No new rashes

## 2022-05-28 NOTE — ED PROVIDER NOTE - ATTENDING CONTRIBUTION TO CARE
I have personally seen and examined this patient.  I have fully participated in the care of this patient. I performed a substantive portion of the visit including all aspects of the medical decision making. I have reviewed all pertinent clinical information, including history, physical exam, plan and the Resident’s note and agree except as noted. - MD Jeremías.    46 yo F, with pmh of renal stone, p/w bloody urine, without pain, no urinary symptoms, pt has an appointment with Urologist next month, here for eval, uti vs. stone, no CVA tenderness, will ua, labs, us, likely dc with urologist follow up.

## 2022-05-31 ENCOUNTER — NON-APPOINTMENT (OUTPATIENT)
Age: 47
End: 2022-05-31

## 2022-05-31 DIAGNOSIS — R31.9 HEMATURIA, UNSPECIFIED: ICD-10-CM

## 2022-06-01 RX ORDER — NITROFURANTOIN MACROCRYSTAL 50 MG
1 CAPSULE ORAL
Qty: 14 | Refills: 0
Start: 2022-06-01 | End: 2022-06-07

## 2022-06-01 NOTE — ED POST DISCHARGE NOTE - DETAILS
6/1/22: pt here for c/f kidney stone pt states she has had resolution in symptoms, no back pain, fevers, chills dysuria. Discussed d/t stone concern will treat bacteruria, pt has uro appointment this week, all questions answered, return precautions given. -Rosita Alarcon PA-C

## 2022-06-02 PROBLEM — N20.0 CALCULUS OF KIDNEY: Chronic | Status: ACTIVE | Noted: 2022-05-28

## 2022-06-02 PROBLEM — I10 ESSENTIAL (PRIMARY) HYPERTENSION: Chronic | Status: ACTIVE | Noted: 2022-05-28

## 2022-06-03 ENCOUNTER — APPOINTMENT (OUTPATIENT)
Dept: UROLOGY | Facility: CLINIC | Age: 47
End: 2022-06-03
Payer: MEDICAID

## 2022-06-03 VITALS — DIASTOLIC BLOOD PRESSURE: 84 MMHG | SYSTOLIC BLOOD PRESSURE: 126 MMHG | RESPIRATION RATE: 15 BRPM | HEART RATE: 93 BPM

## 2022-06-03 PROCEDURE — 99204 OFFICE O/P NEW MOD 45 MIN: CPT

## 2022-06-03 NOTE — HISTORY OF PRESENT ILLNESS
[FreeTextEntry1] : Chief Complaint: hematuria./stone \par \par - Chief Complaint: The patient is a 47y Female complaining of hematuria.\par - HPI Objective Statement: 46 y/o F, PMH of HTN and kidney stone, presents to\par ED  5/26 c/o hematuria, dysuria, and R flank pain x 1 day. Pt denies f/c, CP, SOB,\par urinary frequency, n/v/d, or any other symptoms at this time.\par sono showed mild hydro . ucx showed low count ecoli started a bx a few days later \par feeling better no fever mild lower abd pain

## 2022-06-03 NOTE — ASSESSMENT
[FreeTextEntry1] : will complete abx \par will get ct to better eval stone burden \par advised to go to er for worsening pain/fever

## 2022-06-14 ENCOUNTER — APPOINTMENT (OUTPATIENT)
Dept: INTERNAL MEDICINE | Facility: CLINIC | Age: 47
End: 2022-06-14
Payer: MEDICAID

## 2022-06-14 VITALS
RESPIRATION RATE: 15 BRPM | TEMPERATURE: 97.6 F | BODY MASS INDEX: 29.41 KG/M2 | WEIGHT: 183 LBS | HEIGHT: 66 IN | HEART RATE: 77 BPM | OXYGEN SATURATION: 99 % | DIASTOLIC BLOOD PRESSURE: 86 MMHG | SYSTOLIC BLOOD PRESSURE: 135 MMHG

## 2022-06-14 PROCEDURE — 99213 OFFICE O/P EST LOW 20 MIN: CPT

## 2022-06-14 NOTE — HISTORY OF PRESENT ILLNESS
[de-identified] : 47 year old  female patient with history of stable Hypertension, Iron Deficiency Anemia, Vitamin D Deficiency, Elevated Hemoglobin A1c, Chronic Back Pain, Renal Stone, history as stated, presented for follow up examination. Patient is compliant with all medications. Denies shortness of breath, chest pain or abdominal pains at this time. ROS as stated.\par

## 2022-06-14 NOTE — HEALTH RISK ASSESSMENT
[Never] : Never [No] : In the past 12 months have you used drugs other than those required for medical reasons? No [No falls in past year] : Patient reported no falls in the past year [0] : 2) Feeling down, depressed, or hopeless: Not at all (0) [de-identified] : URO [[id number]] : 0

## 2022-06-14 NOTE — ASSESSMENT
[FreeTextEntry1] : 47 year old female found to have stable Hypertension, Iron Deficiency Anemia, Vitamin D Deficiency, Elevated Hemoglobin A1c, Chronic Back Pain, Renal Stone,with the current prescription regimen as recommended, diet and life style modifications, as counseled. Prior results reviewed, interpreted and discussed with the patient during today's examination, as appropriate. Follow up, treatment plan and tests, as ordered.\par \par Total time spent : 25 minutes\par Including:\par Preparation prior to visit - Reviewing prior record, results of tests and Consultation Reports as applicable\par Conducting an appropriate H & P during today's encounter\par Appropriate orders for tests, medications and procedures, as applicable\par Counseling patient \par Note completion\par

## 2022-06-20 NOTE — HEALTH RISK ASSESSMENT
[Intercurrent ED visits] : went to ED [No] : In the past 12 months have you used drugs other than those required for medical reasons? No [No falls in past year] : Patient reported no falls in the past year [0] : 2) Feeling down, depressed, or hopeless: Not at all (0) [] : No [de-identified] : 08/21 [de-identified] : GYN [[id number]] : 0 1+b/l

## 2022-06-30 ENCOUNTER — NON-APPOINTMENT (OUTPATIENT)
Age: 47
End: 2022-06-30

## 2022-07-06 ENCOUNTER — LABORATORY RESULT (OUTPATIENT)
Age: 47
End: 2022-07-06

## 2022-07-06 ENCOUNTER — APPOINTMENT (OUTPATIENT)
Dept: OBGYN | Facility: CLINIC | Age: 47
End: 2022-07-06

## 2022-07-06 VITALS
BODY MASS INDEX: 29.38 KG/M2 | WEIGHT: 182 LBS | OXYGEN SATURATION: 98 % | SYSTOLIC BLOOD PRESSURE: 146 MMHG | HEART RATE: 88 BPM | DIASTOLIC BLOOD PRESSURE: 87 MMHG

## 2022-07-06 PROCEDURE — 99396 PREV VISIT EST AGE 40-64: CPT

## 2022-07-06 NOTE — HISTORY OF PRESENT ILLNESS
[FreeTextEntry1] : Patient is a 47 year old female who presents for her annual exam.  Reports no heavy bleeding, no abdominal or pelvic pain, change in discharge, change in bowel or bladder habits or any other concerns.  States that she has a lot of discomfort during intercourse and is requesting sonogram.  Due for pap and mammo.

## 2022-07-08 ENCOUNTER — RX RENEWAL (OUTPATIENT)
Age: 47
End: 2022-07-08

## 2022-07-08 ENCOUNTER — APPOINTMENT (OUTPATIENT)
Dept: UROLOGY | Facility: CLINIC | Age: 47
End: 2022-07-08

## 2022-07-08 VITALS
TEMPERATURE: 98.5 F | BODY MASS INDEX: 29.25 KG/M2 | HEIGHT: 66 IN | HEART RATE: 92 BPM | OXYGEN SATURATION: 97 % | SYSTOLIC BLOOD PRESSURE: 131 MMHG | WEIGHT: 182 LBS | DIASTOLIC BLOOD PRESSURE: 81 MMHG

## 2022-07-08 PROCEDURE — 99213 OFFICE O/P EST LOW 20 MIN: CPT

## 2022-07-16 LAB
C TRACH RRNA SPEC QL NAA+PROBE: NOT DETECTED
CYTOLOGY CVX/VAG DOC THIN PREP: ABNORMAL
N GONORRHOEA RRNA SPEC QL NAA+PROBE: NOT DETECTED
SOURCE TP AMPLIFICATION: NORMAL

## 2022-07-19 NOTE — HISTORY OF PRESENT ILLNESS
[FreeTextEntry1] : Chief Complaint: hematuria./stone \par \par - Chief Complaint: The patient is a 47y Female complaining of hematuria.\par - HPI Objective Statement: 48 y/o F, PMH of HTN and kidney stone, presents to\par ED  5/26 c/o hematuria, dysuria, and R flank pain x 1 day. Pt denies f/c, CP, SOB,\par urinary frequency, n/v/d, or any other symptoms at this time.\par sono showed mild hydro . ucx showed low count ecoli started a bx a few days later \par feeling better no fever mild lower abd pain \par \par ct c/w medullary calcinosis

## 2022-07-19 NOTE — ASSESSMENT
[FreeTextEntry1] : ct images reviewed\par 24 hr collection\par .  \par Recommended Kidney stone prevention diet:\par - Good oral hydration so that urine is clear to light yellow, usually 1.5 to 2 Liters of fluids, mainly water\par - Less red meat\par - Less salt\par - Limit foods with oxalate like- dark green vegetables, rhubarb, chocolate, wheat bran, nuts, cranberries, and beans\par - Increase citrate in diet by consuming citrus fruits and juices. Discussed that navin and limes have the most citric acid, oranges, grapefruits, and berries also contain appreciable amounts.\par

## 2022-07-30 NOTE — ED PROVIDER NOTE - OBJECTIVE STATEMENT
46 y/o F, PMH of HTN and kidney stone, presents to ED c/o hematuria, dysuria, and R flank pain x 1 day. Pt denies f/c, CP, SOB, urinary frequency, n/v/d, or any other symptoms at this time.
decreased strength

## 2022-08-24 ENCOUNTER — RX RENEWAL (OUTPATIENT)
Age: 47
End: 2022-08-24

## 2022-09-03 ENCOUNTER — RX RENEWAL (OUTPATIENT)
Age: 47
End: 2022-09-03

## 2022-09-13 ENCOUNTER — APPOINTMENT (OUTPATIENT)
Dept: INTERNAL MEDICINE | Facility: CLINIC | Age: 47
End: 2022-09-13

## 2022-09-13 VITALS
DIASTOLIC BLOOD PRESSURE: 94 MMHG | HEART RATE: 83 BPM | OXYGEN SATURATION: 99 % | RESPIRATION RATE: 16 BRPM | HEIGHT: 66 IN | BODY MASS INDEX: 29.25 KG/M2 | WEIGHT: 182 LBS | SYSTOLIC BLOOD PRESSURE: 157 MMHG

## 2022-09-13 PROCEDURE — 99214 OFFICE O/P EST MOD 30 MIN: CPT

## 2022-09-13 NOTE — ASSESSMENT
[FreeTextEntry1] : 47 year old female found to have stable Hypertension, Iron Deficiency Anemia, Vitamin D Deficiency, Elevated Hemoglobin A1c, Chronic Back Pain, Renal Stone,with the current prescription regimen as recommended, diet and life style modifications, as counseled. Prior results reviewed, interpreted and discussed with the patient during today's examination, as appropriate. Follow up, treatment plan and tests, as ordered.\par \par Supplemental history was obtained from , who is accompanying the patient for today's examination.\par \par Total time spent : 30 minutes\par Including:\par Preparation prior to visit - Reviewing prior record, results of tests and Consultation Reports as applicable\par Conducting an appropriate H & P during today's encounter\par Appropriate orders for tests, medications and procedures, as applicable\par Counseling patient \par Note completion\par

## 2022-09-13 NOTE — HISTORY OF PRESENT ILLNESS
[de-identified] : 47 year old  female patient with history of stable Hypertension, Iron Deficiency Anemia, Vitamin D Deficiency, Elevated Hemoglobin A1c, Chronic Back Pain, Renal Stone, history as stated, presented for follow up examination. Patient is compliant with all medications. Denies shortness of breath, chest pain or abdominal pains at this time. ROS as stated.\par

## 2022-09-13 NOTE — HEALTH RISK ASSESSMENT
[Never] : Never [No] : In the past 12 months have you used drugs other than those required for medical reasons? No [No falls in past year] : Patient reported no falls in the past year [0] : 2) Feeling down, depressed, or hopeless: Not at all (0) [de-identified] : URO/GYN [[id number]] : 0

## 2022-09-15 LAB
25(OH)D3 SERPL-MCNC: 27.9 NG/ML
ALBUMIN SERPL ELPH-MCNC: 4.5 G/DL
ALP BLD-CCNC: 76 U/L
ALT SERPL-CCNC: 20 U/L
ANION GAP SERPL CALC-SCNC: 12 MMOL/L
APPEARANCE: CLEAR
AST SERPL-CCNC: 21 U/L
BACTERIA: NEGATIVE
BASOPHILS # BLD AUTO: 0.02 K/UL
BASOPHILS NFR BLD AUTO: 0.3 %
BILIRUB SERPL-MCNC: 0.2 MG/DL
BILIRUBIN URINE: NEGATIVE
BLOOD URINE: NEGATIVE
BUN SERPL-MCNC: 14 MG/DL
CALCIUM SERPL-MCNC: 9.2 MG/DL
CHLORIDE SERPL-SCNC: 106 MMOL/L
CHOLEST SERPL-MCNC: 203 MG/DL
CO2 SERPL-SCNC: 24 MMOL/L
COLOR: NORMAL
CREAT SERPL-MCNC: 0.64 MG/DL
CREAT SPEC-SCNC: 94 MG/DL
EGFR: 108 ML/MIN/1.73M2
EOSINOPHIL # BLD AUTO: 0.05 K/UL
EOSINOPHIL NFR BLD AUTO: 0.9 %
ESTIMATED AVERAGE GLUCOSE: 114 MG/DL
GGT SERPL-CCNC: 17 U/L
GLUCOSE QUALITATIVE U: NORMAL
GLUCOSE SERPL-MCNC: 88 MG/DL
HBA1C MFR BLD HPLC: 5.6 %
HCT VFR BLD CALC: 35.9 %
HDLC SERPL-MCNC: 56 MG/DL
HGB BLD-MCNC: 10.5 G/DL
HYALINE CASTS: 2 /LPF
IMM GRANULOCYTES NFR BLD AUTO: 0.2 %
IRON SATN MFR SERPL: 6 %
IRON SERPL-MCNC: 28 UG/DL
KETONES URINE: NEGATIVE
LDLC SERPL CALC-MCNC: 129 MG/DL
LEUKOCYTE ESTERASE URINE: ABNORMAL
LYMPHOCYTES # BLD AUTO: 1.43 K/UL
LYMPHOCYTES NFR BLD AUTO: 24.5 %
MAN DIFF?: NORMAL
MCHC RBC-ENTMCNC: 27.9 PG
MCHC RBC-ENTMCNC: 29.2 GM/DL
MCV RBC AUTO: 95.5 FL
MICROALBUMIN 24H UR DL<=1MG/L-MCNC: 1.5 MG/DL
MICROALBUMIN/CREAT 24H UR-RTO: 16 MG/G
MICROSCOPIC-UA: NORMAL
MONOCYTES # BLD AUTO: 0.38 K/UL
MONOCYTES NFR BLD AUTO: 6.5 %
NEUTROPHILS # BLD AUTO: 3.94 K/UL
NEUTROPHILS NFR BLD AUTO: 67.6 %
NITRITE URINE: NEGATIVE
NONHDLC SERPL-MCNC: 147 MG/DL
PH URINE: 7
PLATELET # BLD AUTO: 364 K/UL
POTASSIUM SERPL-SCNC: 4.8 MMOL/L
PROT SERPL-MCNC: 7 G/DL
PROTEIN URINE: NEGATIVE
RBC # BLD: 3.76 M/UL
RBC # FLD: 15.2 %
RED BLOOD CELLS URINE: 3 /HPF
SODIUM SERPL-SCNC: 142 MMOL/L
SPECIFIC GRAVITY URINE: 1.02
SQUAMOUS EPITHELIAL CELLS: 2 /HPF
T3 SERPL-MCNC: 124 NG/DL
T4 FREE SERPL-MCNC: 0.9 NG/DL
TIBC SERPL-MCNC: 506 UG/DL
TRIGL SERPL-MCNC: 90 MG/DL
TSH SERPL-ACNC: 1.23 UIU/ML
UIBC SERPL-MCNC: 478 UG/DL
UROBILINOGEN URINE: NORMAL
WBC # FLD AUTO: 5.83 K/UL
WHITE BLOOD CELLS URINE: 7 /HPF

## 2022-12-01 ENCOUNTER — APPOINTMENT (OUTPATIENT)
Dept: UROLOGY | Facility: CLINIC | Age: 47
End: 2022-12-01

## 2022-12-13 ENCOUNTER — APPOINTMENT (OUTPATIENT)
Dept: INTERNAL MEDICINE | Facility: CLINIC | Age: 47
End: 2022-12-13

## 2022-12-13 VITALS
RESPIRATION RATE: 16 BRPM | SYSTOLIC BLOOD PRESSURE: 130 MMHG | TEMPERATURE: 98.7 F | WEIGHT: 182 LBS | DIASTOLIC BLOOD PRESSURE: 84 MMHG | BODY MASS INDEX: 29.25 KG/M2 | HEIGHT: 66 IN | HEART RATE: 98 BPM | OXYGEN SATURATION: 98 %

## 2022-12-13 DIAGNOSIS — N20.0 CALCULUS OF KIDNEY: ICD-10-CM

## 2022-12-13 PROCEDURE — 99214 OFFICE O/P EST MOD 30 MIN: CPT

## 2022-12-13 NOTE — HEALTH RISK ASSESSMENT
[Never] : Never [No] : In the past 12 months have you used drugs other than those required for medical reasons? No [No falls in past year] : Patient reported no falls in the past year [0] : 2) Feeling down, depressed, or hopeless: Not at all (0) [de-identified] : None [[id number]] : 0

## 2022-12-13 NOTE — HISTORY OF PRESENT ILLNESS
(4) no apparent problem [de-identified] : 47 year old  female patient with history of stable Hypertension, Iron Deficiency Anemia, Vitamin D Deficiency, Elevated Hemoglobin A1c, Chronic Back Pain, Renal Stone, history as stated, presented for follow up examination. Patient is compliant with all medications. Denies shortness of breath, chest pain or abdominal pains at this time. ROS as stated.\par

## 2022-12-14 LAB
BASOPHILS # BLD AUTO: 0.02 K/UL
BASOPHILS NFR BLD AUTO: 0.3 %
EOSINOPHIL # BLD AUTO: 0.03 K/UL
EOSINOPHIL NFR BLD AUTO: 0.5 %
HCT VFR BLD CALC: 36.3 %
HGB BLD-MCNC: 11.1 G/DL
IMM GRANULOCYTES NFR BLD AUTO: 0.2 %
IRON SATN MFR SERPL: 7 %
IRON SERPL-MCNC: 33 UG/DL
LYMPHOCYTES # BLD AUTO: 1.46 K/UL
LYMPHOCYTES NFR BLD AUTO: 25.4 %
MAN DIFF?: NORMAL
MCHC RBC-ENTMCNC: 28.8 PG
MCHC RBC-ENTMCNC: 30.6 GM/DL
MCV RBC AUTO: 94 FL
MONOCYTES # BLD AUTO: 0.29 K/UL
MONOCYTES NFR BLD AUTO: 5 %
NEUTROPHILS # BLD AUTO: 3.94 K/UL
NEUTROPHILS NFR BLD AUTO: 68.6 %
PLATELET # BLD AUTO: 343 K/UL
RBC # BLD: 3.86 M/UL
RBC # FLD: 14 %
TIBC SERPL-MCNC: 485 UG/DL
UIBC SERPL-MCNC: 453 UG/DL
WBC # FLD AUTO: 5.75 K/UL

## 2022-12-26 ENCOUNTER — RX RENEWAL (OUTPATIENT)
Age: 47
End: 2022-12-26

## 2023-02-23 ENCOUNTER — RX RENEWAL (OUTPATIENT)
Age: 48
End: 2023-02-23

## 2023-02-28 ENCOUNTER — APPOINTMENT (OUTPATIENT)
Dept: INTERNAL MEDICINE | Facility: CLINIC | Age: 48
End: 2023-02-28
Payer: MEDICAID

## 2023-02-28 ENCOUNTER — NON-APPOINTMENT (OUTPATIENT)
Age: 48
End: 2023-02-28

## 2023-02-28 VITALS
BODY MASS INDEX: 30.05 KG/M2 | HEIGHT: 66 IN | TEMPERATURE: 98 F | OXYGEN SATURATION: 99 % | WEIGHT: 187 LBS | RESPIRATION RATE: 16 BRPM | DIASTOLIC BLOOD PRESSURE: 87 MMHG | SYSTOLIC BLOOD PRESSURE: 154 MMHG | HEART RATE: 90 BPM

## 2023-02-28 PROCEDURE — 99213 OFFICE O/P EST LOW 20 MIN: CPT | Mod: 25

## 2023-02-28 PROCEDURE — 93000 ELECTROCARDIOGRAM COMPLETE: CPT

## 2023-02-28 NOTE — HEALTH RISK ASSESSMENT
[No] : In the past 12 months have you used drugs other than those required for medical reasons? No [No falls in past year] : Patient reported no falls in the past year [0] : 2) Feeling down, depressed, or hopeless: Not at all (0) [Never] : Never [de-identified] : None [[id number]] : 0

## 2023-02-28 NOTE — ASSESSMENT
[FreeTextEntry1] : 48 year old female found to have stable Hypertension, Iron Deficiency Anemia, Vitamin D Deficiency, Elevated Hemoglobin A1c, Chronic Back Pain, Renal Stone,with the current prescription regimen as recommended, diet and life style modifications, as counseled. Prior results reviewed, interpreted and discussed with the patient during today's examination, as appropriate. Follow up, treatment plan and tests, as ordered.\par \par Supplemental history was obtained from , who is accompanying the patient for today's examination.\par \par EKG:\par Sinus Rhythm @ 85 BPM.\par No new ST-T changes noted.\par \par Total time spent : 25 minutes\par Including:\par Preparation prior to visit - Reviewing prior record, results of tests and Consultation Reports as applicable\par Conducting an appropriate H & P during today's encounter\par Appropriate orders for tests, medications and procedures, as applicable\par Counseling patient \par Note completion\par

## 2023-02-28 NOTE — HISTORY OF PRESENT ILLNESS
[de-identified] : 48 year old  female patient with history of stable Hypertension, Iron Deficiency Anemia, Vitamin D Deficiency, Elevated Hemoglobin A1c, Chronic Back Pain, Renal Stone, history as stated, presented for follow up examination. Patient is compliant with all medications. Denies shortness of breath, chest pain or abdominal pains at this time. ROS as stated.\par

## 2023-03-03 LAB
ALBUMIN SERPL ELPH-MCNC: 4.4 G/DL
ALP BLD-CCNC: 74 U/L
ALT SERPL-CCNC: 18 U/L
ANION GAP SERPL CALC-SCNC: 11 MMOL/L
APPEARANCE: CLEAR
AST SERPL-CCNC: 16 U/L
BACTERIA: NEGATIVE
BASOPHILS # BLD AUTO: 0.02 K/UL
BASOPHILS NFR BLD AUTO: 0.3 %
BILIRUB SERPL-MCNC: 0.2 MG/DL
BILIRUBIN URINE: NEGATIVE
BLOOD URINE: NEGATIVE
BUN SERPL-MCNC: 16 MG/DL
CALCIUM SERPL-MCNC: 9.6 MG/DL
CHLORIDE SERPL-SCNC: 103 MMOL/L
CHOLEST SERPL-MCNC: 212 MG/DL
CO2 SERPL-SCNC: 26 MMOL/L
COLOR: NORMAL
CREAT SERPL-MCNC: 0.63 MG/DL
CREAT SPEC-SCNC: 99 MG/DL
EGFR: 108 ML/MIN/1.73M2
EOSINOPHIL # BLD AUTO: 0.04 K/UL
EOSINOPHIL NFR BLD AUTO: 0.6 %
ESTIMATED AVERAGE GLUCOSE: 111 MG/DL
GGT SERPL-CCNC: 15 U/L
GLUCOSE QUALITATIVE U: NEGATIVE
GLUCOSE SERPL-MCNC: 101 MG/DL
HBA1C MFR BLD HPLC: 5.5 %
HCT VFR BLD CALC: 40.1 %
HDLC SERPL-MCNC: 54 MG/DL
HEMOCCULT STL QL IA: NEGATIVE
HGB BLD-MCNC: 12.2 G/DL
HYALINE CASTS: 1 /LPF
IMM GRANULOCYTES NFR BLD AUTO: 0.3 %
IRON SATN MFR SERPL: 9 %
IRON SERPL-MCNC: 43 UG/DL
KETONES URINE: NEGATIVE
LDLC SERPL CALC-MCNC: 138 MG/DL
LEUKOCYTE ESTERASE URINE: ABNORMAL
LYMPHOCYTES # BLD AUTO: 1.72 K/UL
LYMPHOCYTES NFR BLD AUTO: 27.3 %
MAN DIFF?: NORMAL
MCHC RBC-ENTMCNC: 27.9 PG
MCHC RBC-ENTMCNC: 30.4 GM/DL
MCV RBC AUTO: 91.6 FL
MICROALBUMIN 24H UR DL<=1MG/L-MCNC: 2.1 MG/DL
MICROALBUMIN/CREAT 24H UR-RTO: 21 MG/G
MICROSCOPIC-UA: NORMAL
MONOCYTES # BLD AUTO: 0.37 K/UL
MONOCYTES NFR BLD AUTO: 5.9 %
NEUTROPHILS # BLD AUTO: 4.14 K/UL
NEUTROPHILS NFR BLD AUTO: 65.6 %
NITRITE URINE: NEGATIVE
NONHDLC SERPL-MCNC: 158 MG/DL
PH URINE: 6
PLATELET # BLD AUTO: 333 K/UL
POTASSIUM SERPL-SCNC: 4.5 MMOL/L
PROT SERPL-MCNC: 7 G/DL
PROTEIN URINE: NORMAL
RBC # BLD: 4.38 M/UL
RBC # FLD: 14.9 %
RED BLOOD CELLS URINE: 1 /HPF
SODIUM SERPL-SCNC: 140 MMOL/L
SPECIFIC GRAVITY URINE: 1.02
SQUAMOUS EPITHELIAL CELLS: 1 /HPF
T3 SERPL-MCNC: 130 NG/DL
T4 FREE SERPL-MCNC: 0.9 NG/DL
TIBC SERPL-MCNC: 482 UG/DL
TRIGL SERPL-MCNC: 102 MG/DL
TSH SERPL-ACNC: 1.87 UIU/ML
UIBC SERPL-MCNC: 439 UG/DL
UROBILINOGEN URINE: NORMAL
WBC # FLD AUTO: 6.31 K/UL
WHITE BLOOD CELLS URINE: 28 /HPF

## 2023-03-05 DIAGNOSIS — N39.0 URINARY TRACT INFECTION, SITE NOT SPECIFIED: ICD-10-CM

## 2023-03-05 LAB — BACTERIA UR CULT: ABNORMAL

## 2023-06-20 ENCOUNTER — APPOINTMENT (OUTPATIENT)
Dept: GASTROENTEROLOGY | Facility: CLINIC | Age: 48
End: 2023-06-20

## 2023-07-12 ENCOUNTER — APPOINTMENT (OUTPATIENT)
Dept: OBGYN | Facility: CLINIC | Age: 48
End: 2023-07-12

## 2023-09-14 ENCOUNTER — APPOINTMENT (OUTPATIENT)
Dept: INTERNAL MEDICINE | Facility: CLINIC | Age: 48
End: 2023-09-14
Payer: MEDICAID

## 2023-09-14 VITALS
DIASTOLIC BLOOD PRESSURE: 84 MMHG | RESPIRATION RATE: 16 BRPM | BODY MASS INDEX: 30.05 KG/M2 | SYSTOLIC BLOOD PRESSURE: 156 MMHG | HEART RATE: 87 BPM | HEIGHT: 66 IN | WEIGHT: 187 LBS | OXYGEN SATURATION: 97 %

## 2023-09-14 DIAGNOSIS — Z12.11 ENCOUNTER FOR SCREENING FOR MALIGNANT NEOPLASM OF COLON: ICD-10-CM

## 2023-09-14 DIAGNOSIS — E55.9 VITAMIN D DEFICIENCY, UNSPECIFIED: ICD-10-CM

## 2023-09-14 PROCEDURE — 99213 OFFICE O/P EST LOW 20 MIN: CPT

## 2023-09-14 RX ORDER — CIPROFLOXACIN HYDROCHLORIDE 500 MG/1
500 TABLET, FILM COATED ORAL TWICE DAILY
Qty: 14 | Refills: 0 | Status: DISCONTINUED | COMMUNITY
Start: 2023-03-05 | End: 2023-09-14

## 2023-09-14 RX ORDER — LOSARTAN POTASSIUM 25 MG/1
25 TABLET, FILM COATED ORAL DAILY
Qty: 30 | Refills: 5 | Status: DISCONTINUED | COMMUNITY
Start: 2022-03-10 | End: 2023-09-14

## 2023-09-18 ENCOUNTER — APPOINTMENT (OUTPATIENT)
Dept: CARDIOLOGY | Facility: CLINIC | Age: 48
End: 2023-09-18
Payer: MEDICAID

## 2023-09-18 ENCOUNTER — NON-APPOINTMENT (OUTPATIENT)
Age: 48
End: 2023-09-18

## 2023-09-18 VITALS
HEIGHT: 67 IN | WEIGHT: 187 LBS | SYSTOLIC BLOOD PRESSURE: 160 MMHG | DIASTOLIC BLOOD PRESSURE: 92 MMHG | HEART RATE: 78 BPM | TEMPERATURE: 98.9 F | OXYGEN SATURATION: 100 % | BODY MASS INDEX: 29.35 KG/M2

## 2023-09-18 DIAGNOSIS — Z78.9 OTHER SPECIFIED HEALTH STATUS: ICD-10-CM

## 2023-09-18 PROCEDURE — 93000 ELECTROCARDIOGRAM COMPLETE: CPT

## 2023-09-18 PROCEDURE — 99204 OFFICE O/P NEW MOD 45 MIN: CPT | Mod: 25

## 2023-09-18 RX ORDER — AMLODIPINE BESYLATE 5 MG/1
5 TABLET ORAL DAILY
Qty: 30 | Refills: 6 | Status: ACTIVE | COMMUNITY
Start: 2023-09-18 | End: 1900-01-01

## 2023-09-20 ENCOUNTER — LABORATORY RESULT (OUTPATIENT)
Age: 48
End: 2023-09-20

## 2023-11-18 ENCOUNTER — EMERGENCY (EMERGENCY)
Facility: HOSPITAL | Age: 48
LOS: 1 days | Discharge: HOME | End: 2023-11-18
Attending: EMERGENCY MEDICINE
Payer: MEDICAID

## 2023-11-18 VITALS
WEIGHT: 184.97 LBS | HEART RATE: 111 BPM | HEIGHT: 66 IN | RESPIRATION RATE: 18 BRPM | OXYGEN SATURATION: 97 % | DIASTOLIC BLOOD PRESSURE: 91 MMHG | SYSTOLIC BLOOD PRESSURE: 151 MMHG | TEMPERATURE: 99 F

## 2023-11-18 VITALS
RESPIRATION RATE: 17 BRPM | TEMPERATURE: 98 F | OXYGEN SATURATION: 97 % | SYSTOLIC BLOOD PRESSURE: 129 MMHG | HEART RATE: 95 BPM | DIASTOLIC BLOOD PRESSURE: 81 MMHG

## 2023-11-18 LAB
ALBUMIN SERPL ELPH-MCNC: 4.5 G/DL — SIGNIFICANT CHANGE UP (ref 3.3–5)
ALBUMIN SERPL ELPH-MCNC: 4.5 G/DL — SIGNIFICANT CHANGE UP (ref 3.3–5)
ALP SERPL-CCNC: 82 U/L — SIGNIFICANT CHANGE UP (ref 40–120)
ALP SERPL-CCNC: 82 U/L — SIGNIFICANT CHANGE UP (ref 40–120)
ALT FLD-CCNC: 19 U/L — SIGNIFICANT CHANGE UP (ref 10–45)
ALT FLD-CCNC: 19 U/L — SIGNIFICANT CHANGE UP (ref 10–45)
ANION GAP SERPL CALC-SCNC: 10 MMOL/L — SIGNIFICANT CHANGE UP (ref 5–17)
ANION GAP SERPL CALC-SCNC: 10 MMOL/L — SIGNIFICANT CHANGE UP (ref 5–17)
AST SERPL-CCNC: 19 U/L — SIGNIFICANT CHANGE UP (ref 10–40)
AST SERPL-CCNC: 19 U/L — SIGNIFICANT CHANGE UP (ref 10–40)
BILIRUB SERPL-MCNC: 0.3 MG/DL — SIGNIFICANT CHANGE UP (ref 0.2–1.2)
BILIRUB SERPL-MCNC: 0.3 MG/DL — SIGNIFICANT CHANGE UP (ref 0.2–1.2)
BUN SERPL-MCNC: 10 MG/DL — SIGNIFICANT CHANGE UP (ref 7–23)
BUN SERPL-MCNC: 10 MG/DL — SIGNIFICANT CHANGE UP (ref 7–23)
CALCIUM SERPL-MCNC: 10 MG/DL — SIGNIFICANT CHANGE UP (ref 8.4–10.5)
CALCIUM SERPL-MCNC: 10 MG/DL — SIGNIFICANT CHANGE UP (ref 8.4–10.5)
CHLORIDE SERPL-SCNC: 105 MMOL/L — SIGNIFICANT CHANGE UP (ref 96–108)
CHLORIDE SERPL-SCNC: 105 MMOL/L — SIGNIFICANT CHANGE UP (ref 96–108)
CO2 SERPL-SCNC: 26 MMOL/L — SIGNIFICANT CHANGE UP (ref 22–31)
CO2 SERPL-SCNC: 26 MMOL/L — SIGNIFICANT CHANGE UP (ref 22–31)
CREAT SERPL-MCNC: 0.63 MG/DL — SIGNIFICANT CHANGE UP (ref 0.5–1.3)
CREAT SERPL-MCNC: 0.63 MG/DL — SIGNIFICANT CHANGE UP (ref 0.5–1.3)
D DIMER BLD IA.RAPID-MCNC: <150 NG/ML DDU — SIGNIFICANT CHANGE UP
D DIMER BLD IA.RAPID-MCNC: <150 NG/ML DDU — SIGNIFICANT CHANGE UP
EGFR: 108 ML/MIN/1.73M2 — SIGNIFICANT CHANGE UP
EGFR: 108 ML/MIN/1.73M2 — SIGNIFICANT CHANGE UP
GLUCOSE SERPL-MCNC: 98 MG/DL — SIGNIFICANT CHANGE UP (ref 70–99)
GLUCOSE SERPL-MCNC: 98 MG/DL — SIGNIFICANT CHANGE UP (ref 70–99)
HCT VFR BLD CALC: 34.3 % — LOW (ref 34.5–45)
HCT VFR BLD CALC: 34.3 % — LOW (ref 34.5–45)
HGB BLD-MCNC: 10.6 G/DL — LOW (ref 11.5–15.5)
HGB BLD-MCNC: 10.6 G/DL — LOW (ref 11.5–15.5)
MCHC RBC-ENTMCNC: 26.5 PG — LOW (ref 27–34)
MCHC RBC-ENTMCNC: 26.5 PG — LOW (ref 27–34)
MCHC RBC-ENTMCNC: 30.9 GM/DL — LOW (ref 32–36)
MCHC RBC-ENTMCNC: 30.9 GM/DL — LOW (ref 32–36)
MCV RBC AUTO: 85.8 FL — SIGNIFICANT CHANGE UP (ref 80–100)
MCV RBC AUTO: 85.8 FL — SIGNIFICANT CHANGE UP (ref 80–100)
NRBC # BLD: 0 /100 WBCS — SIGNIFICANT CHANGE UP (ref 0–0)
NRBC # BLD: 0 /100 WBCS — SIGNIFICANT CHANGE UP (ref 0–0)
PLATELET # BLD AUTO: 331 K/UL — SIGNIFICANT CHANGE UP (ref 150–400)
PLATELET # BLD AUTO: 331 K/UL — SIGNIFICANT CHANGE UP (ref 150–400)
POTASSIUM SERPL-MCNC: 4.3 MMOL/L — SIGNIFICANT CHANGE UP (ref 3.5–5.3)
POTASSIUM SERPL-MCNC: 4.3 MMOL/L — SIGNIFICANT CHANGE UP (ref 3.5–5.3)
POTASSIUM SERPL-SCNC: 4.3 MMOL/L — SIGNIFICANT CHANGE UP (ref 3.5–5.3)
POTASSIUM SERPL-SCNC: 4.3 MMOL/L — SIGNIFICANT CHANGE UP (ref 3.5–5.3)
PROT SERPL-MCNC: 8 G/DL — SIGNIFICANT CHANGE UP (ref 6–8.3)
PROT SERPL-MCNC: 8 G/DL — SIGNIFICANT CHANGE UP (ref 6–8.3)
RBC # BLD: 4 M/UL — SIGNIFICANT CHANGE UP (ref 3.8–5.2)
RBC # BLD: 4 M/UL — SIGNIFICANT CHANGE UP (ref 3.8–5.2)
RBC # FLD: 14 % — SIGNIFICANT CHANGE UP (ref 10.3–14.5)
RBC # FLD: 14 % — SIGNIFICANT CHANGE UP (ref 10.3–14.5)
SODIUM SERPL-SCNC: 141 MMOL/L — SIGNIFICANT CHANGE UP (ref 135–145)
SODIUM SERPL-SCNC: 141 MMOL/L — SIGNIFICANT CHANGE UP (ref 135–145)
WBC # BLD: 8.53 K/UL — SIGNIFICANT CHANGE UP (ref 3.8–10.5)
WBC # BLD: 8.53 K/UL — SIGNIFICANT CHANGE UP (ref 3.8–10.5)
WBC # FLD AUTO: 8.53 K/UL — SIGNIFICANT CHANGE UP (ref 3.8–10.5)
WBC # FLD AUTO: 8.53 K/UL — SIGNIFICANT CHANGE UP (ref 3.8–10.5)

## 2023-11-18 PROCEDURE — 99284 EMERGENCY DEPT VISIT MOD MDM: CPT

## 2023-11-18 PROCEDURE — 99285 EMERGENCY DEPT VISIT HI MDM: CPT | Mod: 25

## 2023-11-18 PROCEDURE — 80053 COMPREHEN METABOLIC PANEL: CPT

## 2023-11-18 PROCEDURE — 93005 ELECTROCARDIOGRAM TRACING: CPT

## 2023-11-18 PROCEDURE — 85379 FIBRIN DEGRADATION QUANT: CPT

## 2023-11-18 PROCEDURE — 85027 COMPLETE CBC AUTOMATED: CPT

## 2023-11-18 PROCEDURE — 71046 X-RAY EXAM CHEST 2 VIEWS: CPT

## 2023-11-18 PROCEDURE — 71046 X-RAY EXAM CHEST 2 VIEWS: CPT | Mod: 26

## 2023-11-18 PROCEDURE — 94640 AIRWAY INHALATION TREATMENT: CPT

## 2023-11-18 RX ORDER — ALBUTEROL 90 UG/1
2 AEROSOL, METERED ORAL ONCE
Refills: 0 | Status: COMPLETED | OUTPATIENT
Start: 2023-11-18 | End: 2023-11-18

## 2023-11-18 RX ORDER — DEXAMETHASONE 0.5 MG/5ML
6 ELIXIR ORAL ONCE
Refills: 0 | Status: COMPLETED | OUTPATIENT
Start: 2023-11-18 | End: 2023-11-18

## 2023-11-18 RX ORDER — IPRATROPIUM/ALBUTEROL SULFATE 18-103MCG
3 AEROSOL WITH ADAPTER (GRAM) INHALATION
Refills: 0 | Status: COMPLETED | OUTPATIENT
Start: 2023-11-18 | End: 2023-11-18

## 2023-11-18 RX ADMIN — Medication 3 MILLILITER(S): at 14:36

## 2023-11-18 RX ADMIN — Medication 3 MILLILITER(S): at 14:15

## 2023-11-18 RX ADMIN — Medication 6 MILLIGRAM(S): at 14:15

## 2023-11-18 RX ADMIN — Medication 3 MILLILITER(S): at 14:59

## 2023-11-18 RX ADMIN — ALBUTEROL 2 PUFF(S): 90 AEROSOL, METERED ORAL at 16:58

## 2023-11-18 NOTE — ED PROVIDER NOTE - PHYSICAL EXAMINATION
General: well-appearing, no acute distress  Head: Atraumatic, normocephalic  Eyes: EOM grossly in tact, no scleral icterus, no discharge  ENT: moist mucous membranes  Neurology: A&Ox 3, nonfocal, DELA CRUZ x 4  Respiratory: wheezing present B/l   CV: tachycardic, good s1/s2, no S3, Extremities warm and well perfused  Abdominal: Soft, non-distended, non-tender, no masses  Extremities: No edema, no deformities  Skin: warm and dry. No rashes

## 2023-11-18 NOTE — ED PROVIDER NOTE - CLINICAL SUMMARY MEDICAL DECISION MAKING FREE TEXT BOX
51-year-old female with past medical history of hypertension coming in with concern of shortness of breath. Differential diagnosis includes but is not limited to viral infection, post viral bronchitis vs. wheeze, vs. pneumonia. will get xray, tx with duonebs and steroids and reassess. likely dc w/ pmd f/u

## 2023-11-18 NOTE — ED PROVIDER NOTE - PATIENT PORTAL LINK FT
You can access the FollowMyHealth Patient Portal offered by [facility] by registering at the following website: [email or web address]. By joining [employer]’s FollowMyHealth portal, you will also be able to view your health information using other applications (apps) compatible with our system.

## 2023-11-18 NOTE — ED PROVIDER NOTE - OBJECTIVE STATEMENT
51-year-old female with past medical history of hypertension coming in with concern of shortness of breath.  Patient was diagnosed with COVID 1 week ago.  She states initially she was not short of breath.  Now she noticed that even at rest she feels like she cannot catch her breath.  No lower extremity pain or swelling, chest pain, nausea, vomiting, diarrhea.  No known sick contacts.  No history of asthma.  She is not taking any medications for this problem.

## 2023-11-18 NOTE — ED PROVIDER NOTE - NSCAREINITIATED _GEN_ER
Initiate Treatment: Take one pill of Valtrex three times a day for one week.
Detail Level: Zone
Madeline Abreu(Resident)

## 2023-11-18 NOTE — ED PROVIDER NOTE - ATTENDING CONTRIBUTION TO CARE
Patient is a 48-year-old male history hypertension diagnosed with COVID a week ago been having cough congestion no weakness or chills.  Patient presenting with shortness of breath satting 90% on room air afebrile stable vitals no leg complaints.  On exam some scant wheezing noted nebs steroids chest x-ray also some chest tightness. Ambulated patient down the hallway she did not desat Or become short of breath

## 2023-11-18 NOTE — ED ADULT NURSE NOTE - READING LANGUAGE PREFERRED
A/P: eval for ACS for intracranial issues given HA and abnormal behavior. Consult psych for further reqs. noted CXR changes are stable since prior. Not clinically concerning. Patient denies any symptoms at this time. Case endorsed to me by Dr. Mustafa -- patient accepted [facility] service for decompensated schizophrenia, pending placement. Given degree of delusions, had full medical clearance eval for delirium, was medically cleared Received pt from Dr Levy awaiting IPP bed. English

## 2023-11-18 NOTE — ED PROVIDER NOTE - PROGRESS NOTE DETAILS
Brady, PGY-3, EM: pt reassessed. wheezing resolved. discussed return precautions and plan for albuterol management. discussed labs/xray.

## 2023-11-18 NOTE — ED PROVIDER NOTE - NSFOLLOWUPINSTRUCTIONS_ED_ALL_ED_FT
Please follow up with your primary care physician within 2-3 days.   Return to the ER for any new or concerning symptoms.   You may take 975 mg acetaminophen every 6 hours as needed for pain.    Take albuterol 2 puffs every 4 hours for next 48 hours, then every 4 hours as needed for cough/shortness of breath. If he/she is requiring albuterol more frequently, please call your pediatrician or return to the ER. Please follow up with your primary care physician within 2-3 days.   Return to the ER for any new or concerning symptoms.   You may take 975 mg acetaminophen every 6 hours as needed for pain.    Take albuterol 2 puffs every 4 hours for next 48 hours, then every 4 hours as needed for cough/shortness of breath. If he/she is requiring albuterol more frequently, please call your pediatrician or return to the ER.    Call 911 anytime you think you may need emergency care. For example, call if:    You have severe shortness of breath.  You have symptoms of a heart attack. These may include:  Chest pain or pressure, or a strange feeling in the chest.  Sweating.  Shortness of breath.  Nausea or vomiting.  Pain, pressure, or a strange feeling in the back, neck, jaw, or upper belly or in one or both shoulders or arms.  Light-headedness or sudden weakness.  A fast or irregular heartbeat.    After you call 911, the  may tell you to chew 1 adult-strength or 2 to 4 low-dose aspirin. Wait for an ambulance. Do not try to drive yourself.

## 2023-11-21 DIAGNOSIS — U07.1 COVID-19: ICD-10-CM

## 2023-11-30 ENCOUNTER — LABORATORY RESULT (OUTPATIENT)
Age: 48
End: 2023-11-30

## 2023-11-30 ENCOUNTER — APPOINTMENT (OUTPATIENT)
Dept: OBGYN | Facility: CLINIC | Age: 48
End: 2023-11-30
Payer: MEDICAID

## 2023-11-30 VITALS
OXYGEN SATURATION: 99 % | HEART RATE: 116 BPM | BODY MASS INDEX: 28.66 KG/M2 | SYSTOLIC BLOOD PRESSURE: 161 MMHG | WEIGHT: 183 LBS | DIASTOLIC BLOOD PRESSURE: 83 MMHG

## 2023-11-30 DIAGNOSIS — B96.89 ACUTE VAGINITIS: ICD-10-CM

## 2023-11-30 DIAGNOSIS — N76.0 ACUTE VAGINITIS: ICD-10-CM

## 2023-11-30 PROCEDURE — 99396 PREV VISIT EST AGE 40-64: CPT

## 2023-11-30 RX ORDER — METRONIDAZOLE 7.5 MG/G
0.75 GEL VAGINAL
Qty: 1 | Refills: 0 | Status: ACTIVE | COMMUNITY
Start: 2023-11-30 | End: 1900-01-01

## 2023-12-02 PROBLEM — N76.0 BACTERIAL VAGINOSIS: Status: ACTIVE | Noted: 2023-12-02 | Resolved: 2024-01-01

## 2023-12-07 ENCOUNTER — APPOINTMENT (OUTPATIENT)
Dept: OBGYN | Facility: CLINIC | Age: 48
End: 2023-12-07
Payer: MEDICAID

## 2023-12-07 ENCOUNTER — ASOB RESULT (OUTPATIENT)
Age: 48
End: 2023-12-07

## 2023-12-07 PROCEDURE — 76830 TRANSVAGINAL US NON-OB: CPT

## 2023-12-11 ENCOUNTER — APPOINTMENT (OUTPATIENT)
Dept: OBGYN | Facility: CLINIC | Age: 48
End: 2023-12-11
Payer: MEDICAID

## 2023-12-11 ENCOUNTER — LABORATORY RESULT (OUTPATIENT)
Age: 48
End: 2023-12-11

## 2023-12-11 VITALS
DIASTOLIC BLOOD PRESSURE: 96 MMHG | SYSTOLIC BLOOD PRESSURE: 162 MMHG | OXYGEN SATURATION: 100 % | HEART RATE: 96 BPM | BODY MASS INDEX: 28.82 KG/M2 | WEIGHT: 184 LBS

## 2023-12-11 PROCEDURE — 99213 OFFICE O/P EST LOW 20 MIN: CPT

## 2023-12-14 ENCOUNTER — APPOINTMENT (OUTPATIENT)
Dept: INTERNAL MEDICINE | Facility: CLINIC | Age: 48
End: 2023-12-14
Payer: MEDICAID

## 2023-12-14 VITALS
WEIGHT: 184 LBS | OXYGEN SATURATION: 100 % | SYSTOLIC BLOOD PRESSURE: 137 MMHG | DIASTOLIC BLOOD PRESSURE: 86 MMHG | TEMPERATURE: 98.9 F | HEART RATE: 78 BPM | HEIGHT: 67 IN | BODY MASS INDEX: 28.88 KG/M2

## 2023-12-14 DIAGNOSIS — G89.29 DORSALGIA, UNSPECIFIED: ICD-10-CM

## 2023-12-14 DIAGNOSIS — M54.9 DORSALGIA, UNSPECIFIED: ICD-10-CM

## 2023-12-14 PROCEDURE — 99213 OFFICE O/P EST LOW 20 MIN: CPT

## 2023-12-14 NOTE — HISTORY OF PRESENT ILLNESS
[de-identified] : 48 year old  female patient with history of stable Hypertension, Iron Deficiency Anemia, Vitamin D Deficiency, Elevated Hemoglobin A1c, Chronic Back Pain, Renal Stone, history as stated, presented for follow up examination. Patient is compliant with all medications. ROS as stated.

## 2023-12-14 NOTE — REVIEW OF SYSTEMS
[Back Pain] : back pain [TextEntry] : CARDIOVASCULAR: Negative RESPIRATORY: Negative GASTROINTESTINAL: Negative NEUROLOGICAL: Negative

## 2023-12-14 NOTE — HEALTH RISK ASSESSMENT
[No] : In the past 12 months have you used drugs other than those required for medical reasons? No [No falls in past year] : Patient reported no falls in the past year [0] : 2) Feeling down, depressed, or hopeless: Not at all (0) [Never] : Never [Intercurrent ED visits] : went to ED [de-identified] : 11/23 [de-identified] : CARD/GYN [[id number]] : 0

## 2023-12-14 NOTE — ASSESSMENT
[FreeTextEntry1] : 48 year old female found to have stable Hypertension, Iron Deficiency Anemia, Vitamin D Deficiency, Elevated Hemoglobin A1c, Chronic Back Pain, Renal Stone, with the current prescription regimen as recommended, diet and lifestyle modifications, as counseled. Prior results reviewed, interpreted and discussed with the patient during today's examination, as appropriate. Follow up, treatment plan and tests, as ordered.   Total time spent:: 25 minutes Including: Preparation prior to visit - Reviewing prior record, results of tests and Consultation Reports as applicable Conducting an appropriate H & P during today's encounter Appropriate orders for tests, medications and procedures, as applicable Counseling patient Note completion

## 2023-12-18 ENCOUNTER — APPOINTMENT (OUTPATIENT)
Dept: CARDIOLOGY | Facility: CLINIC | Age: 48
End: 2023-12-18

## 2024-01-20 ENCOUNTER — EMERGENCY (EMERGENCY)
Facility: HOSPITAL | Age: 49
LOS: 1 days | Discharge: HOME | End: 2024-01-20
Attending: EMERGENCY MEDICINE
Payer: MEDICAID

## 2024-01-20 VITALS
RESPIRATION RATE: 20 BRPM | OXYGEN SATURATION: 99 % | WEIGHT: 190.04 LBS | HEART RATE: 125 BPM | HEIGHT: 66 IN | SYSTOLIC BLOOD PRESSURE: 177 MMHG | DIASTOLIC BLOOD PRESSURE: 106 MMHG | TEMPERATURE: 100 F

## 2024-01-20 LAB
ALBUMIN SERPL ELPH-MCNC: 4.1 G/DL — SIGNIFICANT CHANGE UP (ref 3.3–5)
ALP SERPL-CCNC: 80 U/L — SIGNIFICANT CHANGE UP (ref 40–120)
ALT FLD-CCNC: 20 U/L — SIGNIFICANT CHANGE UP (ref 10–45)
ANION GAP SERPL CALC-SCNC: 11 MMOL/L — SIGNIFICANT CHANGE UP (ref 5–17)
APTT BLD: 29.6 SEC — SIGNIFICANT CHANGE UP (ref 24.5–35.6)
AST SERPL-CCNC: 17 U/L — SIGNIFICANT CHANGE UP (ref 10–40)
BASOPHILS # BLD AUTO: 0.02 K/UL — SIGNIFICANT CHANGE UP (ref 0–0.2)
BASOPHILS NFR BLD AUTO: 0.2 % — SIGNIFICANT CHANGE UP (ref 0–2)
BILIRUB SERPL-MCNC: <0.1 MG/DL — LOW (ref 0.2–1.2)
BLD GP AB SCN SERPL QL: NEGATIVE — SIGNIFICANT CHANGE UP
BUN SERPL-MCNC: 12 MG/DL — SIGNIFICANT CHANGE UP (ref 7–23)
CALCIUM SERPL-MCNC: 9.8 MG/DL — SIGNIFICANT CHANGE UP (ref 8.4–10.5)
CHLORIDE SERPL-SCNC: 103 MMOL/L — SIGNIFICANT CHANGE UP (ref 96–108)
CO2 SERPL-SCNC: 25 MMOL/L — SIGNIFICANT CHANGE UP (ref 22–31)
CREAT SERPL-MCNC: 0.6 MG/DL — SIGNIFICANT CHANGE UP (ref 0.5–1.3)
EGFR: 109 ML/MIN/1.73M2 — SIGNIFICANT CHANGE UP
EOSINOPHIL # BLD AUTO: 0.05 K/UL — SIGNIFICANT CHANGE UP (ref 0–0.5)
EOSINOPHIL NFR BLD AUTO: 0.6 % — SIGNIFICANT CHANGE UP (ref 0–6)
GAS PNL BLDV: SIGNIFICANT CHANGE UP
GLUCOSE SERPL-MCNC: 121 MG/DL — HIGH (ref 70–99)
HCG SERPL-ACNC: <2 MIU/ML — SIGNIFICANT CHANGE UP
HCT VFR BLD CALC: 24.9 % — LOW (ref 34.5–45)
HCT VFR BLD CALC: 27.1 % — LOW (ref 34.5–45)
HGB BLD-MCNC: 7.5 G/DL — LOW (ref 11.5–15.5)
HGB BLD-MCNC: 8.2 G/DL — LOW (ref 11.5–15.5)
IMM GRANULOCYTES NFR BLD AUTO: 0.2 % — SIGNIFICANT CHANGE UP (ref 0–0.9)
INR BLD: 1.15 RATIO — SIGNIFICANT CHANGE UP (ref 0.85–1.18)
LYMPHOCYTES # BLD AUTO: 1.71 K/UL — SIGNIFICANT CHANGE UP (ref 1–3.3)
LYMPHOCYTES # BLD AUTO: 20.1 % — SIGNIFICANT CHANGE UP (ref 13–44)
MAGNESIUM SERPL-MCNC: 2 MG/DL — SIGNIFICANT CHANGE UP (ref 1.6–2.6)
MCHC RBC-ENTMCNC: 26.3 PG — LOW (ref 27–34)
MCHC RBC-ENTMCNC: 30.3 GM/DL — LOW (ref 32–36)
MCV RBC AUTO: 86.9 FL — SIGNIFICANT CHANGE UP (ref 80–100)
MONOCYTES # BLD AUTO: 0.4 K/UL — SIGNIFICANT CHANGE UP (ref 0–0.9)
MONOCYTES NFR BLD AUTO: 4.7 % — SIGNIFICANT CHANGE UP (ref 2–14)
NEUTROPHILS # BLD AUTO: 6.29 K/UL — SIGNIFICANT CHANGE UP (ref 1.8–7.4)
NEUTROPHILS NFR BLD AUTO: 74.2 % — SIGNIFICANT CHANGE UP (ref 43–77)
NRBC # BLD: 0 /100 WBCS — SIGNIFICANT CHANGE UP (ref 0–0)
PLATELET # BLD AUTO: 339 K/UL — SIGNIFICANT CHANGE UP (ref 150–400)
POTASSIUM SERPL-MCNC: 3.7 MMOL/L — SIGNIFICANT CHANGE UP (ref 3.5–5.3)
POTASSIUM SERPL-SCNC: 3.7 MMOL/L — SIGNIFICANT CHANGE UP (ref 3.5–5.3)
PROT SERPL-MCNC: 6.8 G/DL — SIGNIFICANT CHANGE UP (ref 6–8.3)
PROTHROM AB SERPL-ACNC: 12 SEC — SIGNIFICANT CHANGE UP (ref 9.5–13)
RBC # BLD: 3.12 M/UL — LOW (ref 3.8–5.2)
RBC # FLD: 18.2 % — HIGH (ref 10.3–14.5)
RH IG SCN BLD-IMP: POSITIVE — SIGNIFICANT CHANGE UP
SODIUM SERPL-SCNC: 139 MMOL/L — SIGNIFICANT CHANGE UP (ref 135–145)
WBC # BLD: 8.49 K/UL — SIGNIFICANT CHANGE UP (ref 3.8–10.5)
WBC # FLD AUTO: 8.49 K/UL — SIGNIFICANT CHANGE UP (ref 3.8–10.5)

## 2024-01-20 PROCEDURE — 85025 COMPLETE CBC W/AUTO DIFF WBC: CPT

## 2024-01-20 PROCEDURE — 99291 CRITICAL CARE FIRST HOUR: CPT | Mod: 25

## 2024-01-20 PROCEDURE — 85610 PROTHROMBIN TIME: CPT

## 2024-01-20 PROCEDURE — 86901 BLOOD TYPING SEROLOGIC RH(D): CPT

## 2024-01-20 PROCEDURE — 86850 RBC ANTIBODY SCREEN: CPT

## 2024-01-20 PROCEDURE — 86900 BLOOD TYPING SEROLOGIC ABO: CPT

## 2024-01-20 PROCEDURE — 80053 COMPREHEN METABOLIC PANEL: CPT

## 2024-01-20 PROCEDURE — 84132 ASSAY OF SERUM POTASSIUM: CPT

## 2024-01-20 PROCEDURE — 76830 TRANSVAGINAL US NON-OB: CPT

## 2024-01-20 PROCEDURE — 86923 COMPATIBILITY TEST ELECTRIC: CPT

## 2024-01-20 PROCEDURE — 84295 ASSAY OF SERUM SODIUM: CPT

## 2024-01-20 PROCEDURE — 83605 ASSAY OF LACTIC ACID: CPT

## 2024-01-20 PROCEDURE — 36415 COLL VENOUS BLD VENIPUNCTURE: CPT

## 2024-01-20 PROCEDURE — 83735 ASSAY OF MAGNESIUM: CPT

## 2024-01-20 PROCEDURE — 85018 HEMOGLOBIN: CPT

## 2024-01-20 PROCEDURE — 85730 THROMBOPLASTIN TIME PARTIAL: CPT

## 2024-01-20 PROCEDURE — 76830 TRANSVAGINAL US NON-OB: CPT | Mod: 26

## 2024-01-20 PROCEDURE — 84702 CHORIONIC GONADOTROPIN TEST: CPT

## 2024-01-20 PROCEDURE — 85014 HEMATOCRIT: CPT

## 2024-01-20 PROCEDURE — 99291 CRITICAL CARE FIRST HOUR: CPT

## 2024-01-20 PROCEDURE — 93975 VASCULAR STUDY: CPT | Mod: 26

## 2024-01-20 PROCEDURE — 93975 VASCULAR STUDY: CPT

## 2024-01-20 PROCEDURE — P9016: CPT

## 2024-01-20 PROCEDURE — 82330 ASSAY OF CALCIUM: CPT

## 2024-01-20 PROCEDURE — 82803 BLOOD GASES ANY COMBINATION: CPT

## 2024-01-20 PROCEDURE — 36430 TRANSFUSION BLD/BLD COMPNT: CPT

## 2024-01-20 PROCEDURE — 82435 ASSAY OF BLOOD CHLORIDE: CPT

## 2024-01-20 PROCEDURE — 82947 ASSAY GLUCOSE BLOOD QUANT: CPT

## 2024-01-20 NOTE — CONSULT NOTE ADULT - SUBJECTIVE AND OBJECTIVE BOX
SERJIO GREGG    48y    Female    [id number]    47yo G_P_ with PMSHx significant for * presents with heavy vaginal bleeding x2 weeks and associated fatigue.    HPI:      Name of Ob/Gyn Physician:  OBHx:  GynHx: Denies  PMHx: Denies  PSHx: Denies  Meds: Denies  All: NKDA      PAST MEDICAL & SURGICAL HISTORY:  HTN (hypertension)      Kidney stone      No significant past surgical history          MEDICATIONS  (STANDING):    MEDICATIONS  (PRN):      Allergies    penicillin (Unknown)    Intolerances        SOCIAL HISTORY:    FAMILY HISTORY:      REVIEW OF SYSTEMS  General: denies fevers, chills, tiredness  Skin/Breast: denies breast pain  Respiratory and Thorax: denies shortness of breath, denies cough  Cardiovascular: denies chest pain and denies palpitations  Gastrointestinal: denies abdominal pain, nausea/ vomiting	  Genitourinary: denies dysuria, increased urinary frequency, urgency	  Constitutional, Cardiovascular, Respiratory, Gastrointestinal, Genitourinary, Musculoskeletal and Integumentary review of systems are normal except as noted. 	      Vital Signs Last 24 Hrs  T(C): 37.5 (20 Jan 2024 17:33), Max: 37.5 (20 Jan 2024 17:33)  T(F): 99.5 (20 Jan 2024 17:33), Max: 99.5 (20 Jan 2024 17:33)  HR: 108 (20 Jan 2024 18:19) (108 - 125)  BP: 154/77 (20 Jan 2024 18:19) (154/77 - 177/106)  BP(mean): --  RR: 18 (20 Jan 2024 18:19) (18 - 20)  SpO2: 100% (20 Jan 2024 18:19) (99% - 100%)    Parameters below as of 20 Jan 2024 18:19  Patient On (Oxygen Delivery Method): room air        PHYSICAL EXAM:   Exam performed with Chaperone **  Gen: NAD   Cardiovascular: Clinically well perfused  Respiratory: Breathing comfortably on RA  Abd: Soft, non-tender, non-distended  Pelvic: Speculum - no blood in vaginal vault, cervix closed/long; Bimanual - no CMT, no adnexal tenderness or palpable masses, no uterine tenderness  Extremities: Non-tender, non-edematous; able to move all ext equally  Neuro: AOx3      LABS:                        8.2    8.49  )-----------( 339      ( 20 Jan 2024 18:02 )             27.1     01-20    139  |  103  |  12  ----------------------------<  121<H>  3.7   |  25  |  0.60    Ca    9.8      20 Jan 2024 18:02  Mg     2.0     01-20    TPro  6.8  /  Alb  4.1  /  TBili  <0.1<L>  /  DBili  x   /  AST  17  /  ALT  20  /  AlkPhos  80  01-20    PT/INR - ( 20 Jan 2024 18:02 )   PT: 12.0 sec;   INR: 1.15 ratio         PTT - ( 20 Jan 2024 18:02 )  PTT:29.6 sec  Urinalysis Basic - ( 20 Jan 2024 18:02 )    Color: x / Appearance: x / SG: x / pH: x  Gluc: 121 mg/dL / Ketone: x  / Bili: x / Urobili: x   Blood: x / Protein: x / Nitrite: x   Leuk Esterase: x / RBC: x / WBC x   Sq Epi: x / Non Sq Epi: x / Bacteria: x        RADIOLOGY & ADDITIONAL STUDIES:   SERJIO GREGG    48y    Female    [id number]    47yo  with PMSHx significant for HTN and bilateral salpingectomy presenting with heavy vaginal bleeding x2 weeks and associated dizziness, weakness, and palpitations. Patient reports regular, monthly menses through November. Last normal period was . She had annual exam with her GYN 23 and underwent "screening" ultrasound 23 (patient reports no abnormal pain/bleeding at that time), which revealed globular uterus and heterogenous endometrial lining 23 mm thick. Patient had endometrial biopsy 23, results benign (proliferative endometrium). She reports vaginal "spotting" since the biopsy, which became heavy vaginal bleeding on 24. Patient reports passing large clots and using 10 pads per day last week. This week, bleeding has continued but slightly lighter. This is much heavier than her typical menses. She reports dizziness, weakness, and palpitations this week, prompting ED evaluation. She has GYN appt on 24. Denies nausea, vomiting, abdominal/pelvic pain.    Name of Ob/Gyn Physician: Dr. Clemons  OBHx:  x3  GynHx: bilateral salpingectomy, endometriosis, fibroids  PMHx: HTN, hx kidney stones  PSHx: s/p bilateral salpingectomy (20 yr ago), lithotripsy  Meds: Oral Fe; prescribed anti-HTN but does not take it  All: Penicillin (unknown)    Vital Signs Last 24 Hrs  T(C): 37.5 (2024 17:33), Max: 37.5 (2024 17:33)  T(F): 99.5 (2024 17:33), Max: 99.5 (2024 17:33)  HR: 108 (2024 18:19) (108 - 125)  BP: 154/77 (2024 18:19) (154/77 - 177/106)  BP(mean): --  RR: 18 (2024 18:19) (18 - 20)  SpO2: 100% (2024 18:19) (99% - 100%)    Parameters below as of 2024 18:19  Patient On (Oxygen Delivery Method): room air        PHYSICAL EXAM:   Exam performed with Chaperone Roderick PCA  Gen: NAD   Cardiovascular: Clinically well perfused  Respiratory: Breathing comfortably on RA  Abd: Soft, non-tender, non-distended  Pelvic: Speculum - 5 cc blood in vaginal vault, cervix soft without active bleeding, Bimanual - no CMT, no adnexal tenderness or palpable masses, no uterine tenderness  Extremities: Non-edematous; able to move all ext equally  Neuro: AOx3      LABS:                          7.5    x     )-----------( x        (  @ 20:59 )             24.9                8.2    8.49  )-----------( 339      ( 2024 18:02 )             27.1         139  |  103  |  12  ----------------------------<  121<H>  3.7   |  25  |  0.60    Ca    9.8      2024 18:02  Mg     2.0         TPro  6.8  /  Alb  4.1  /  TBili  <0.1<L>  /  DBili  x   /  AST  17  /  ALT  20  /  AlkPhos  80      PT/INR - ( 2024 18:02 )   PT: 12.0 sec;   INR: 1.15 ratio         PTT - ( 2024 18:02 )  PTT:29.6 sec  Urinalysis Basic - ( 2024 18:02 )    Color: x / Appearance: x / SG: x / pH: x  Gluc: 121 mg/dL / Ketone: x  / Bili: x / Urobili: x   Blood: x / Protein: x / Nitrite: x   Leuk Esterase: x / RBC: x / WBC x   Sq Epi: x / Non Sq Epi: x / Bacteria: x        RADIOLOGY & ADDITIONAL STUDIES:  < from: US Transvaginal (24 @ 19:58) >  ACC: [id number] EXAM:  US DPLX PELVIC   ORDERED BY: ROBERT BARNETT     ACC: [id number] EXAM:  US TRANSVAGINAL   ORDERED BY: ROBERT BARNETT     PROCEDURE DATE:  2024          INTERPRETATION:  CLINICAL INFORMATION: Vaginal bleeding.    LMP: 2024    COMPARISON: None available.    TECHNIQUE:  Endovaginal and transabdominal pelvic sonogram. Color and Spectral   Doppler was performed.    FINDINGS:  Uterus: 9.8 x 7.3 x 9.2 cm. Somewhat globular morphology of the uterus   with heterogeneity of the parenchyma. No discrete mass is identified.   Several nabothian cysts are noted.  Endometrium: Thickened and mildly heterogeneous measuring 1.7 cm.    Right ovary: 2.4 x 1.6 x 2.2 cm.. Normal appearing venous flow are   demonstrated within the right ovary. There is a tubular fluid-filled   structure seen adjacent to the right ovary within the right adnexa. This   is suspicious for hydrosalpinx.  Left ovary: 2.9 x 3.6 x 2.7 cm. There is a 2.1 cm simple appearing   anechoic cysts. An additional cystic structure seen adjacent to the left   ovary measuring 2.9 x 1.1 x 1.7 cm. This is likely an elongated   paraovarian cyst, though the possibility of focal hydrosalpinx is also   raised. Normal arterial and venous waveforms.    Fluid: None.    IMPRESSION:  Thickened and mildly heterogeneous endometrial stripe measurement of 1.7   cm.    Globular morphology of the uterus with heterogeneous parenchyma. This is   nonspecific though may suggest adenomyosis. MRI can be obtained for more   definitive diagnosis, if clinically indicated.    Simple appearing anechoic presumed follicular cyst of the left ovary.    Dilated tubular fluid-filled structure of the right adnexa suspicious for   hydrosalpinx.    Elongated paraovarian cyst versus focal hydrosalpinx of the left adnexa..        --- End of Report ---    < end of copied text >   SERJIO GREGG    48y    Female    [id number]    47yo  with PMSHx significant for HTN and bilateral salpingectomy presenting with heavy vaginal bleeding x2 weeks and associated dizziness, weakness, and palpitations. Patient reports regular, monthly menses through November. Last normal period was . She had annual exam with her GYN 23 and underwent "screening" ultrasound 23 (patient reports no abnormal pain/bleeding at that time), which revealed globular uterus and heterogenous endometrial lining 23 mm. Patient had endometrial biopsy 23, results benign (proliferative endometrium). She reports vaginal "spotting" since the biopsy, which became heavy vaginal bleeding on 24. Patient reports passing large clots and using 10 pads per day last week. This week, bleeding has continued but slightly lighter. This is much heavier than her typical menses. She reports dizziness, weakness, and palpitations this week, prompting ED evaluation. She has GYN appt on 24. Denies nausea, vomiting, abdominal/pelvic pain.    Name of Ob/Gyn Physician: Dr. Clemons  OBHx:  x3  GynHx: bilateral salpingectomy, endometriosis, fibroids  PMHx: HTN, hx kidney stones  PSHx: s/p bilateral salpingectomy (20 yr ago), lithotripsy  Meds: Oral Fe; prescribed anti-HTN but does not take it  All: Penicillin (unknown)    Vital Signs Last 24 Hrs  T(C): 37.5 (2024 17:33), Max: 37.5 (2024 17:33)  T(F): 99.5 (2024 17:33), Max: 99.5 (2024 17:33)  HR: 108 (2024 18:19) (108 - 125)  BP: 154/77 (2024 18:19) (154/77 - 177/106)  BP(mean): --  RR: 18 (2024 18:19) (18 - 20)  SpO2: 100% (2024 18:19) (99% - 100%)    Parameters below as of 2024 18:19  Patient On (Oxygen Delivery Method): room air        PHYSICAL EXAM:   Exam performed with Chaperone Roderick PCA  Gen: NAD   Cardiovascular: Clinically well perfused  Respiratory: Breathing comfortably on RA  Abd: Soft, non-tender, non-distended  Pelvic: Speculum - 5 cc blood in vaginal vault, cervix soft without active bleeding, Bimanual - no CMT, no adnexal tenderness or palpable masses, no uterine tenderness  Extremities: Non-edematous; able to move all ext equally  Neuro: AOx3      LABS:                          7.5    x     )-----------( x        (  @ 20:59 )             24.9                8.2    8.49  )-----------( 339      ( 2024 18:02 )             27.1         139  |  103  |  12  ----------------------------<  121<H>  3.7   |  25  |  0.60    Ca    9.8      2024 18:02  Mg     2.0         TPro  6.8  /  Alb  4.1  /  TBili  <0.1<L>  /  DBili  x   /  AST  17  /  ALT  20  /  AlkPhos  80      PT/INR - ( 2024 18:02 )   PT: 12.0 sec;   INR: 1.15 ratio         PTT - ( 2024 18:02 )  PTT:29.6 sec  Urinalysis Basic - ( 2024 18:02 )    Color: x / Appearance: x / SG: x / pH: x  Gluc: 121 mg/dL / Ketone: x  / Bili: x / Urobili: x   Blood: x / Protein: x / Nitrite: x   Leuk Esterase: x / RBC: x / WBC x   Sq Epi: x / Non Sq Epi: x / Bacteria: x        RADIOLOGY & ADDITIONAL STUDIES:  < from: US Transvaginal (24 @ 19:58) >  ACC: [id number] EXAM:  US DPLX PELVIC   ORDERED BY: ROBERT BARNETT     ACC: [id number] EXAM:  US TRANSVAGINAL   ORDERED BY: ROBERT BARNETT     PROCEDURE DATE:  2024          INTERPRETATION:  CLINICAL INFORMATION: Vaginal bleeding.    LMP: 2024    COMPARISON: None available.    TECHNIQUE:  Endovaginal and transabdominal pelvic sonogram. Color and Spectral   Doppler was performed.    FINDINGS:  Uterus: 9.8 x 7.3 x 9.2 cm. Somewhat globular morphology of the uterus   with heterogeneity of the parenchyma. No discrete mass is identified.   Several nabothian cysts are noted.  Endometrium: Thickened and mildly heterogeneous measuring 1.7 cm.    Right ovary: 2.4 x 1.6 x 2.2 cm.. Normal appearing venous flow are   demonstrated within the right ovary. There is a tubular fluid-filled   structure seen adjacent to the right ovary within the right adnexa. This   is suspicious for hydrosalpinx.  Left ovary: 2.9 x 3.6 x 2.7 cm. There is a 2.1 cm simple appearing   anechoic cysts. An additional cystic structure seen adjacent to the left   ovary measuring 2.9 x 1.1 x 1.7 cm. This is likely an elongated   paraovarian cyst, though the possibility of focal hydrosalpinx is also   raised. Normal arterial and venous waveforms.    Fluid: None.    IMPRESSION:  Thickened and mildly heterogeneous endometrial stripe measurement of 1.7   cm.    Globular morphology of the uterus with heterogeneous parenchyma. This is   nonspecific though may suggest adenomyosis. MRI can be obtained for more   definitive diagnosis, if clinically indicated.    Simple appearing anechoic presumed follicular cyst of the left ovary.    Dilated tubular fluid-filled structure of the right adnexa suspicious for   hydrosalpinx.    Elongated paraovarian cyst versus focal hydrosalpinx of the left adnexa..        --- End of Report ---    < end of copied text >

## 2024-01-20 NOTE — ED PROVIDER NOTE - PATIENT PORTAL LINK FT
You can access the FollowMyHealth Patient Portal offered by [facility] by registering at the following website: [email or web address]. By joining [employer]’s FollowMyHealth portal, you will also be able to view your health information using other applications (apps) compatible with our system.

## 2024-01-20 NOTE — ED ADULT NURSE NOTE - OBJECTIVE STATEMENT
48y F brought to the Er by her  presenting with heavy menstrual bleeding that be began 1/8 and s currently on going. Pt states since 1/8 she has been going through about 10 pad a day. Pt states she feel weakness, dizziness, hands get a little sweaty, and fatigue. Pt states LMP was 11/4/23. Pt states she had a biopsy on 12/4/23 and has been spotting daily up to 1/8 when heavy menstruating began. Pt airway is patent, abdomen, soft, non tender, non distended. Pt denies any n/v/HA, fever, chills, chest pain,

## 2024-01-20 NOTE — CONSULT NOTE ADULT - ASSESSMENT
A/P 48 y  presenting with heavy vaginal bleeding and symptomatic anemia) in setting of recent EMBx. Transvaginal ultrasound findings are suspicious for adenomyosis and show EM lining 17 mm. Differential for AUB includes structural lesion (polyp) vs. adenomyosis vs. anovulatory bleeding in mehnaz-menopausal woman. Patient is hemodynamically stable with light-moderate vaginal bleeding currently. Labs are notable for downtrending H/H while in the ED.    Plan  - No acute GYN intervention at this time  - Agree w/ pRBC transfusion  - D/c home with Aygestin 5 mg BID x5 days, followed by Aygestin 5 mg QD  - Patient to follow up with Dr. Clemons on 24 as scheduled  - Continue management per ED/ CDU team     d/w attending Narcisa Dotson PGY2   A/P 48 y  presenting with heavy vaginal bleeding and symptomatic anemia) in setting of recent EMBx. Transvaginal ultrasound findings are suspicious for adenomyosis and show EM lining 17 mm. Differential for AUB includes structural lesion (polyp) vs. adenomyosis vs. anovulatory bleeding in mehnaz-menopausal woman. Patient is hemodynamically stable with light-moderate vaginal bleeding currently. Labs are notable for downtrending H/H while in the ED. Additionally, possible hydrosalpinx on sono unlikely given hx bilateral salpingectomy, more likely bowel vs ovarian cyst was visualized.    Plan  - No acute GYN intervention at this time  - Agree w/ pRBC transfusion  - D/c home with Aygestin 5 mg BID x5 days, followed by Aygestin 5 mg QD  - Patient to follow up with Dr. Clemons on 24 as scheduled  - Continue management per ED/ CDU team     d/w attending Narcisa Dotson PGY2

## 2024-01-20 NOTE — ED PROVIDER NOTE - OBJECTIVE STATEMENT
49yo F with PMH of HTN, no blood thinners presents to ED for vaginal bleeding. 12/8 had endometrial biopsy for thickening on US that was benign. SInce then had spotting, then on 1/8 had vaginal bleeding. Thought it was period but never this heavy. Bleeding 10 heavy pads for 2 weeks including clots. No abd pain, NVDC, fever, CP. Now starting to feel more fatigued, PIZARRO and some lightheadness. Has GYN appt on Friday.

## 2024-01-20 NOTE — ED PROVIDER NOTE - PHYSICAL EXAMINATION
CONSTITUTIONAL: NAD  SKIN: Warm dry  HEAD: NCAT  EYES: NL inspection, no significant conjunctival pallor  NECK: Supple  CARD: RRR  RESP: CTAB  ABD: soft, NTND, no palpable masses appreciated  : pelvic deferred for TVUS at time of exam  EXT: no LE edema  NEURO: Grossly unremarkable  PSYCH: Cooperative, appropriate.

## 2024-01-20 NOTE — ED PROVIDER NOTE - ATTENDING CONTRIBUTION TO CARE
Patient is a 48-year-old female with history hypertension not on any blood thinners presenting with vaginal bleeding for 2 weeks now she states that when her period would have started presented and using about 2 pads every 3 hours for the last several days feels a little weak and lightheaded no shortness of breath noted to be tacky on the monitor with stable blood pressure abdomen soft nontender.  Patient states she had an endometrial biopsy in the beginning of December due to abnormal thickening ultrasound prior to that.  Patient been taking iron labs today reviewed with notable drop in hemoglobin of 2 point, pelvic exam OB/GYN ultrasound reordered.

## 2024-01-20 NOTE — CONSULT NOTE ADULT - ATTENDING COMMENTS
48 y  presenting with symptomatic anemia due to HMB     I agree with above resident's note. Imaging findings c/f adenomyosis, possible polyp. Plan for pRBC transfusion and aygestin taper. Has outpatient FUV this week.    Lee Ann Rossi MD

## 2024-01-21 VITALS
HEART RATE: 95 BPM | RESPIRATION RATE: 15 BRPM | OXYGEN SATURATION: 100 % | SYSTOLIC BLOOD PRESSURE: 144 MMHG | TEMPERATURE: 98 F | DIASTOLIC BLOOD PRESSURE: 71 MMHG

## 2024-01-21 RX ORDER — NORETHINDRONE 0.35 MG/1
1 TABLET ORAL
Qty: 15 | Refills: 0
Start: 2024-01-21 | End: 2024-01-25

## 2024-01-26 ENCOUNTER — APPOINTMENT (OUTPATIENT)
Dept: OBGYN | Facility: CLINIC | Age: 49
End: 2024-01-26
Payer: MEDICAID

## 2024-01-26 VITALS
WEIGHT: 186 LBS | HEART RATE: 94 BPM | BODY MASS INDEX: 29.13 KG/M2 | DIASTOLIC BLOOD PRESSURE: 84 MMHG | SYSTOLIC BLOOD PRESSURE: 146 MMHG | OXYGEN SATURATION: 99 %

## 2024-01-26 PROCEDURE — 99213 OFFICE O/P EST LOW 20 MIN: CPT

## 2024-02-05 RX ORDER — TRANEXAMIC ACID 650 MG/1
650 TABLET ORAL EVERY 8 HOURS
Qty: 30 | Refills: 0 | Status: ACTIVE | COMMUNITY
Start: 2024-02-05 | End: 1900-01-01

## 2024-02-14 ENCOUNTER — APPOINTMENT (OUTPATIENT)
Dept: OBGYN | Facility: CLINIC | Age: 49
End: 2024-02-14
Payer: MEDICAID

## 2024-02-14 VITALS
DIASTOLIC BLOOD PRESSURE: 90 MMHG | WEIGHT: 182 LBS | HEART RATE: 80 BPM | BODY MASS INDEX: 28.51 KG/M2 | OXYGEN SATURATION: 98 % | SYSTOLIC BLOOD PRESSURE: 166 MMHG

## 2024-02-14 PROCEDURE — 99212 OFFICE O/P EST SF 10 MIN: CPT

## 2024-02-14 NOTE — HISTORY OF PRESENT ILLNESS
[FreeTextEntry1] : Patient presents with questions in regards to her upcoming D&C hysteroscopy for her polyp.  Discussed pre and post op care.  All questions answered.

## 2024-03-07 ENCOUNTER — NON-APPOINTMENT (OUTPATIENT)
Age: 49
End: 2024-03-07

## 2024-03-07 ENCOUNTER — APPOINTMENT (OUTPATIENT)
Dept: INTERNAL MEDICINE | Facility: CLINIC | Age: 49
End: 2024-03-07
Payer: MEDICAID

## 2024-03-07 VITALS
TEMPERATURE: 98.3 F | SYSTOLIC BLOOD PRESSURE: 145 MMHG | OXYGEN SATURATION: 98 % | DIASTOLIC BLOOD PRESSURE: 75 MMHG | WEIGHT: 182 LBS | BODY MASS INDEX: 28.56 KG/M2 | HEIGHT: 67 IN | RESPIRATION RATE: 14 BRPM | HEART RATE: 93 BPM

## 2024-03-07 DIAGNOSIS — R73.09 OTHER ABNORMAL GLUCOSE: ICD-10-CM

## 2024-03-07 DIAGNOSIS — I10 ESSENTIAL (PRIMARY) HYPERTENSION: ICD-10-CM

## 2024-03-07 DIAGNOSIS — E78.5 HYPERLIPIDEMIA, UNSPECIFIED: ICD-10-CM

## 2024-03-07 DIAGNOSIS — D50.9 IRON DEFICIENCY ANEMIA, UNSPECIFIED: ICD-10-CM

## 2024-03-07 DIAGNOSIS — Z01.818 ENCOUNTER FOR OTHER PREPROCEDURAL EXAMINATION: ICD-10-CM

## 2024-03-07 PROCEDURE — 99214 OFFICE O/P EST MOD 30 MIN: CPT | Mod: 25

## 2024-03-07 PROCEDURE — 93000 ELECTROCARDIOGRAM COMPLETE: CPT

## 2024-03-07 RX ORDER — PROMETHAZINE HYDROCHLORIDE AND DEXTROMETHORPHAN HYDROBROMIDE ORAL SOLUTION 15; 6.25 MG/5ML; MG/5ML
6.25-15 SOLUTION ORAL
Qty: 210 | Refills: 2 | Status: DISCONTINUED | COMMUNITY
Start: 2023-11-21 | End: 2024-03-07

## 2024-03-09 LAB
ABO + RH PNL BLD: NORMAL
ALBUMIN SERPL ELPH-MCNC: 4.3 G/DL
ALP BLD-CCNC: 79 U/L
ALT SERPL-CCNC: 17 U/L
ANION GAP SERPL CALC-SCNC: 15 MMOL/L
APPEARANCE: CLEAR
APTT BLD: 33.9 SEC
AST SERPL-CCNC: 20 U/L
BACTERIA UR CULT: NORMAL
BACTERIA: NEGATIVE /HPF
BASOPHILS # BLD AUTO: 0.02 K/UL
BASOPHILS NFR BLD AUTO: 0.4 %
BILIRUB SERPL-MCNC: <0.2 MG/DL
BILIRUBIN URINE: NEGATIVE
BLOOD URINE: NEGATIVE
BUN SERPL-MCNC: 12 MG/DL
CALCIUM SERPL-MCNC: 9.4 MG/DL
CAST: 0 /LPF
CHLORIDE SERPL-SCNC: 105 MMOL/L
CO2 SERPL-SCNC: 22 MMOL/L
COLOR: YELLOW
CREAT SERPL-MCNC: 0.64 MG/DL
EGFR: 107 ML/MIN/1.73M2
EOSINOPHIL # BLD AUTO: 0.06 K/UL
EOSINOPHIL NFR BLD AUTO: 1.1 %
EPITHELIAL CELLS: 3 /HPF
ESTIMATED AVERAGE GLUCOSE: 103 MG/DL
GLUCOSE QUALITATIVE U: NEGATIVE MG/DL
GLUCOSE SERPL-MCNC: 92 MG/DL
HBA1C MFR BLD HPLC: 5.2 %
HCT VFR BLD CALC: 36.1 %
HGB BLD-MCNC: 10.8 G/DL
IMM GRANULOCYTES NFR BLD AUTO: 0.2 %
INR PPP: 0.98 RATIO
IRON SATN MFR SERPL: 6 %
IRON SERPL-MCNC: 29 UG/DL
KETONES URINE: NEGATIVE MG/DL
LEUKOCYTE ESTERASE URINE: ABNORMAL
LYMPHOCYTES # BLD AUTO: 1.28 K/UL
LYMPHOCYTES NFR BLD AUTO: 23.9 %
MAN DIFF?: NORMAL
MCHC RBC-ENTMCNC: 26.8 PG
MCHC RBC-ENTMCNC: 29.9 GM/DL
MCV RBC AUTO: 89.6 FL
MICROSCOPIC-UA: NORMAL
MONOCYTES # BLD AUTO: 0.42 K/UL
MONOCYTES NFR BLD AUTO: 7.8 %
NEUTROPHILS # BLD AUTO: 3.57 K/UL
NEUTROPHILS NFR BLD AUTO: 66.6 %
NITRITE URINE: NEGATIVE
PH URINE: 6
PLATELET # BLD AUTO: 318 K/UL
POTASSIUM SERPL-SCNC: 4.4 MMOL/L
PROT SERPL-MCNC: 7 G/DL
PROTEIN URINE: NEGATIVE MG/DL
PT BLD: 11.1 SEC
RBC # BLD: 4.03 M/UL
RBC # FLD: 16.9 %
RED BLOOD CELLS URINE: 1 /HPF
SODIUM SERPL-SCNC: 141 MMOL/L
SPECIFIC GRAVITY URINE: 1.01
TIBC SERPL-MCNC: 478 UG/DL
UIBC SERPL-MCNC: 449 UG/DL
UROBILINOGEN URINE: 0.2 MG/DL
WBC # FLD AUTO: 5.36 K/UL
WHITE BLOOD CELLS URINE: 1 /HPF

## 2024-03-09 NOTE — ASSESSMENT
[Procedure Intermediate Risk] : the procedure risk is intermediate [Patient Intermediate Risk] : the patient is an intermediate risk [Optimized for Surgery Pending Laboratory Results] : the patient is optimized for surgery pending laboratory results [As per surgery] : as per surgery [Continue] : Continue medications as currently directed [FreeTextEntry4] : 49 year old female found to have stable Hypertension, Hyperlipidemia, Iron Deficiency Anemia, Vitamin D Deficiency, Elevated Hemoglobin A1c, Chronic Back Pain, Renal Stone, with the current prescription regimen as recommended, diet and lifestyle modifications, as counseled. Prior results reviewed, interpreted and discussed with the patient during today's examination, as appropriate. Follow up, treatment plan and tests, as ordered.  Possible D&C with Hysteroscopy for AUB, as per GYN.   EKG: Sinus Rhythm @ 75 BPM. No new acute ST-T changes noted.  Total time spent:: 30 minutes Including: Preparation prior to visit - Reviewing prior record, results of tests and Consultation Reports as applicable Conducting an appropriate H & P during today's encounter Appropriate orders for tests, medications and procedures, as applicable Counseling patient Note completion

## 2024-03-09 NOTE — ADDENDUM
[FreeTextEntry1] : PST results from 3/7/24 noted, discussed with the patient. HGB - 10.8, compliance with Ferrous Sulfate 325 mg, 1 tablet twice a day as counseled again and insisted.  Patient is medically optimized to the best at this time for the stated intervention. Patient is medically cleared.

## 2024-03-09 NOTE — HISTORY OF PRESENT ILLNESS
[No Pertinent Cardiac History] : no history of aortic stenosis, atrial fibrillation, coronary artery disease, recent myocardial infarction, or implantable device/pacemaker [No Pertinent Pulmonary History] : no history of asthma, COPD, sleep apnea, or smoking [No Adverse Anesthesia Reaction] : no adverse anesthesia reaction in self or family member [(Patient denies any chest pain, claudication, dyspnea on exertion, orthopnea, palpitations or syncope)] : Patient denies any chest pain, claudication, dyspnea on exertion, orthopnea, palpitations or syncope [Chronic Kidney Disease] : no chronic kidney disease [Chronic Anticoagulation] : no chronic anticoagulation [Diabetes] : no diabetes [FreeTextEntry1] : D&C with Hysteroscopy [FreeTextEntry2] : 03/20/24 [FreeTextEntry4] : 49 year old female with history of stable Hypertension, Hyperlipidemia, Iron Deficiency Anemia, Vitamin D Deficiency, Elevated Hemoglobin A1c, Chronic Back Pain, Renal Stone, history as stated, presented for clearance evaluation prior to possible D&C with Hysteroscopy for AUB, as per GYN. [FreeTextEntry3] : Dr. Liz Clemons

## 2024-03-14 ENCOUNTER — OUTPATIENT (OUTPATIENT)
Dept: OUTPATIENT SERVICES | Facility: HOSPITAL | Age: 49
LOS: 1 days | End: 2024-03-14
Payer: MEDICAID

## 2024-03-14 VITALS
OXYGEN SATURATION: 97 % | DIASTOLIC BLOOD PRESSURE: 84 MMHG | SYSTOLIC BLOOD PRESSURE: 143 MMHG | WEIGHT: 184.97 LBS | RESPIRATION RATE: 18 BRPM | HEIGHT: 67 IN | TEMPERATURE: 98 F | HEART RATE: 86 BPM

## 2024-03-14 DIAGNOSIS — N93.9 ABNORMAL UTERINE AND VAGINAL BLEEDING, UNSPECIFIED: ICD-10-CM

## 2024-03-14 DIAGNOSIS — Z98.890 OTHER SPECIFIED POSTPROCEDURAL STATES: Chronic | ICD-10-CM

## 2024-03-14 DIAGNOSIS — I10 ESSENTIAL (PRIMARY) HYPERTENSION: ICD-10-CM

## 2024-03-14 DIAGNOSIS — Z01.818 ENCOUNTER FOR OTHER PREPROCEDURAL EXAMINATION: ICD-10-CM

## 2024-03-14 LAB — BLD GP AB SCN SERPL QL: SIGNIFICANT CHANGE UP

## 2024-03-14 RX ORDER — AMLODIPINE BESYLATE 2.5 MG/1
1 TABLET ORAL
Refills: 0 | DISCHARGE

## 2024-03-14 RX ORDER — SODIUM CHLORIDE 9 MG/ML
3 INJECTION INTRAMUSCULAR; INTRAVENOUS; SUBCUTANEOUS EVERY 8 HOURS
Refills: 0 | Status: DISCONTINUED | OUTPATIENT
Start: 2024-03-20 | End: 2024-04-03

## 2024-03-14 RX ORDER — FERROUS FUMARATE 350(115)MG
1 TABLET ORAL
Refills: 0 | DISCHARGE

## 2024-03-14 NOTE — H&P PST ADULT - PROBLEM SELECTOR PLAN 2
Current treatment regimen - Amlodipine 5mg daily  Advised to continue with current regimen   Patient instructed with understanding verbalized to take Amlodipine with a sip of water the morning of surgery   Follow up with PCP/Cardiologist postoperatively

## 2024-03-14 NOTE — H&P PST ADULT - NSICDXPASTMEDICALHX_GEN_ALL_CORE_FT
PAST MEDICAL HISTORY:  2019 novel coronavirus disease (COVID-19)     Anemia     Calculus of kidney     HTN (hypertension)     Kidney stone     Menstrual headache     Panic attack     Seasonal allergies

## 2024-03-14 NOTE — H&P PST ADULT - GENITOURINARY COMMENTS
Patient Education     Diabetes: descripción general  La diabetes es un problema de lary a nova plazo. Significa que bynum cuerpo no produce insulina suficiente. O también puede significar que bynum cuerpo no puede utilizar la insulina que produce. La insulina es destini hormona en el organismo que permite que el azúcar en la barron (glucosa) llegue a las células del cuerpo. Todas masoud células necesitan glucosa benny combustible.   Cuando tiene diabetes, la glucosa en bynum cuerpo se acumula porque no puede llegar hasta las células. Esta acumulación se conoce benny un nivel alto de azúcar en la barron (hiperglucemia).   Bynum nivel de azúcar en la barron depende de varias cosas. Depende de la clase de alimentos que come y de cuánto come. También depende de cuánto ejercicio hace y de cuánta insulina tiene en bynum organismo. Superior mucho de las clases indebidas de alimentos o no jayne a tiempo los medicamentos para la diabetes puede causar con el tiempo un nivel alto de azúcar en la barron. Las infecciones pueden provocar un nivel alto de azúcar en la barron, incluso si está tomando masoud medicamentos correctamente.   Los siguientes factores también pueden causar un nivel bajo de azúcar en la barron:  Saltarse las comidas  No comer suficientes alimentos  Hacer ejercicio físico intenso o no planificado  Jayne demasiado de un medicamento para la diabetes  Si no se la trata, con el tiempo, la diabetes puede causar problemas graves. Estos problemas incluyen los siguientes:   Enfermedades cardíacas  Accidente cerebrovascular  Insuficiencia renal  Ceguera  Dolor en los nervios  Pérdida de sensibilidad en las piernas y los pies  Muerte de tejido (gangrena)  Al mantener bynum azúcar en la barron bajo control usted puede prevenir o retrasar estos problemas.   Los niveles saludables de azúcar en la barron son de entre 80 mg/dl y 100 mg/dl antes de las comidas y de menos de 180 mg/dl de 1 a 2 horas después de comer.   Cuidados en el hogar  Siga estas  recomendaciones para cuidarse en bynum casa:  Siga la dieta que le recomiende bynum proveedor de atención médica. Adminístrese la insulina o cualquier otro medicamento para la diabetes, exactamente benny se lo indiquen.  Vigile masoud niveles de azúcar en la barron ebnny le indiquen. Lleve un registro con los resultados. Parsons ayudará a bynum proveedor a cambiar masoud medicamentos para mantener el azúcar en la barron bajo control.  Trate de alcanzar bynum peso ideal. Es posible que pueda reducir la cantidad de medicamentos para la diabetes, o dejar de administrárselos, si come los alimentos adecuados y hace ejercicio.  No fume. Fumar hace que los efectos de la diabetes empeoren en bynum circulación. Si fuma y tiene diabetes es mucho más probable que sufra un ataque cardíaco. No use cigarrillos electrónicos ni productos de vapeo.  Cuídese nimisha los pies. Si griffith perdido sensibilidad en los pies, es posible que no se dé cuenta si tiene destini herida o destini infección. Revísese los pies y la tanvir entre los dedos por lo menos destini vez al día. Use un kell para revisar las plantas de los pies.  Lleve puesto un brazalete o un collar de alerta médica o lleve destini tarjeta en bynum billetera que diga que tiene diabetes. Parsons ayudará a los proveedores de atención médica a brindarle los cuidados adecuados si usted se enferma hasta el punto que no pueda decirles que tiene diabetes.  Plan para los días de enfermedad  Si usted contrae un resfriado, gripe o destini infección viral, siga estos pasos:  Revise bynum plan para un día de enfermedad de la diabetes y llame a bynum proveedor de atención médica benny le indicaron que lo hiciera. Es posible que deba llamar al proveedor de inmediato en los siguientes casos:  Bynum nivel de azúcar en la barron está por encima de 240 mg/dl a pesar de estar administrándose los medicamentos para la diabetes.  Los niveles de cetonas en la orina están por encima de lo normal o altos.  Ha estado vomitando yvette más de 6 horas.  Tiene  dificultades para respirar o el aliento tiene olor frutado.  Tiene fiebre monika.  Tiene fiebre yvette varios días y no mejora.  Se siente más aturdido o somnoliento de lo usual.  Siga tomando masoud pastillas para la diabetes (medicamentos orales) incluso si ha estado vomitando y se siente muy enfermo. Llame a bynum proveedor de inmediato. Es posible que necesite insulina para bajar los niveles de azúcar en la barron hasta que se recupere de bynum enfermedad.  Siga tomando la insulina incluso si ha estado vomitando y se siente muy enfermo. Llame a bynum proveedor de inmediato y pregunte si necesita cambiar bynum dosis de insulina. [place] dependerá de los resultados de masoud niveles de azúcar en la barron.  Revise bynum nivel de azúcar en la barron cada 2 a 4 horas, o por lo menos 4 veces al día.  Revise con frecuencia masoud cetonas. Revíselas con más frecuencia si está vomitando o tiene diarrea.  No se saltee ninguna comida. Trate de comer comidas pequeñas a intervalos regulares. Hágalo aunque no sienta gana de comer.  Estela agua y otros líquidos que no contengan cafeína ni calorías. [place] evitará que se deshidrate. Si tiene náuseas o vómitos, tome pequeños sorbos cada 5 minutos. Para prevenir la deshidratación, trate de beber destini taza (8 onzas o 250 ml) de líquido cada hora mientras esté despierto.  Cuidados generales  Siempre lleve con usted destini deb de azúcar de acción rápida para el chu de que tenga síntomas de un nivel bajo de azúcar en la barron (menos de 70 mg/dl). A los primeros signos de un nivel bajo de azúcar en la barron, coma o estela de 15 a 20 gramos de azúcar de acción rápida para elevar el nivel de azúcar en la barron. Por ejemplo:   3 a 4 tabletas de glucosa. Pueden comprarse en la mayoría de las farmacias.  4 onzas (1/2 taza) de un refresco común (que no sea dietético)  4 onzas (1/2 taza) de cualquier jugo de fruta  5 a 6 caramelos duros  1 cucharada de miel  Revise bynum nivel de azúcar en la barron 15 minutos después de  haberse administrado el tratamiento. Si sigue por debajo de 70 mg/dl, coma de 15 a 20 gramos más de azúcar de acción rápida. Vuelva a revisarse a los 15 minutos. Si regresa a lo normal (70 mg/dl o más), coma un bocadillo o destini comida para mantener el azúcar en la barron dentro de un nivel seguro. Si permanece bajo, llame a bynum médico o vaya a destini naya de emergencias.   Si ha tenido episodios graves de nivel bajo de azúcar en la barron, asegúrese de que alguien de bynum cynthia esté capacitado para inyectarle glucagón. [place] hará que bynum nivel de azúcar en la barron aumente si está inconsciente y no puede comer las tabletas ni los alimentos mencionados anteriormente.   Visitas de control  Programe destini visita de control con bnyum proveedor de atención médica o según lo que se le haya indicado. Para obtener más información acerca de la diabetes, visite la págCurvo web de la [employer] [place] [employer] en www.diabetes.org. O llame al [phone].   Cuándo buscar atención médica  Llame a bynum proveedor de atención médica de inmediato si tiene alguno de estos síntomas de nivel alto de azúcar en la barron que no desaparece con las sugerencias de tratamiento anteriores:   Necesidad de orinar a menudo  Somnolencia  Sed  Dolor de arnulfo  Náuseas o vómitos  Dolor abdominal  Cambios en la vista  Respiración acelerada  También llame a bynum proveedor de inmediato si tiene alguno de estos signos de nivel bajo de azúcar en la barron que no desaparece con las sugerencias de tratamiento anteriores:   Agotamiento físico  Dolor de arnulfo  Temblores  Exceso de sudoración  Hambre  Sensación de ansiedad o inquietud  Cambios en la vista  Somnolencia  Debilidad  Cuándo llamar al 911  Llame al 911 si ocurre algo de lo siguiente:   Dolor de pecho o falta de aliento  Mareos o desmayos  Debilidad en un brazo o destini pierna, o en un lado de la russ  Dificultad para hablar o para fredy   Confusión o pérdida del conocimiento  © [phone] [employer] [facility]  [employer]. Todos los derechos reservados. Esta información no pretende sustituir la atención médica profesional. Sólo bynum médico puede diagnosticar y tratar un problema de lary.           Patient Education     Alimentación: diabetes  La comida es destini herramienta importante que puede usar para controlar la diabetes y mantenerse saludable. Mantener destini alimentación nimisha equilibrada en las cantidades correctas lo ayudará a controlar los niveles de glucosa en la barron y prevenir las reacciones al bajo nivel de azúcar en la barron. También ayudará a reducir los riesgos de la diabetes para la lary. No existe destini dieta específica que sea la indicada para todas las personas con diabetes. Karl hay lineamientos generales que pueden seguirse. Un nutricionista matriculado creará un enfoque alimentario personalizado que sea adecuado para usted. Leonila también lo ayudará a planear comidas y refrigerios saludables. Si tiene alguna pregunta, llame a bynum nutricionista para que lo asesore.  Pautas para triunfar  Hable con bynum proveedor de atención médica antes de iniciar destini dieta para la diabetes o un programa para bajar de peso. Si todavía no ha hablado con un nutricionista, pídale destini derivación a bynum proveedor. Las siguientes pautas pueden ayudarlo a triunfar:  Seleccione alimentos de los 6 grupos que se encuentran a continuación. Bynum nutricionista lo ayudará a buscar opciones dentro de cada sadia. Leonila también le mostrará los tamaños de las porciones que puede comer en cada comida.  Granos, frijoles y verduras con almidón  Verduras  Frutas  Leche o yogur  Carne de res, ave, pescado o tofu  Grasas saludables  Controle masoud niveles de azúcar en la barron según lo indicado por bynum proveedor. [place] los medicamentos bart benny se lo haya recetado bynum proveedor.  Aprenda a leer las etiquetas de los alimentos y elija los tamaños de las porciones correctos.  Limite el consumo de carbohidratos en cada comida para ayudar a controlar la diabetes.  Los carbohidratos que come se convierten en glucosa en la barron. Hable con bynum proveedor de atención médica acerca de cuántos gramos de carbohidratos le recomienda en cada comida. Hiral 3 comidas en horarios constantes. No se saltee ninguna comida. Si tiene hambre entre las comidas, puede hacer un refrigerio pequeño con poco contenido de carbohidratos.  Hable con bynum proveedor de atención médica si baudilio alcohol. El alcohol puede tener un efecto impredecible sobre la glucosa en la barron. También tiene muchas calorías vacías y puede aumentar el nivel de un tipo de grasa en la barron llamado triglicéridos. En cambio, sourav agua o bebidas dietéticas sin calorías.  Coma menos grasa para ayudar a reducir el riesgo de problemas cardíacos. Consuma productos lácteos descremados o parcialmente descremados y carne de res magra. Evite los alimentos fritos. Use aceite para cocinar que no esté saturado; por ejemplo, de amin, canola o maní.  No coma alimentos con sal agregada. La elissa puede contribuir a la presión arterial monika, lo que puede causar destini enfermedad del corazón. Las personas con diabetes ya corren riesgo de presión arterial monika y enfermedad del corazón.  Mantenga un peso saludable. Si necesita adelgazar, reduzca el tamaño de las porciones. Karl no se saltee comidas. El ejercicio es destini parte importante del programa de control de peso. Hable con bynum proveedor sobre un programa de ejercicios que sea adecuado para usted.  Para obtener información sobre el mejor plan alimentario para usted, consulte con un nutricionista matriculado. Para encontrar beatrice en bynum área, comuníquese con:  Academy of Nutrition and Dietetics www.eatright.org  American Diabetes Association [phone] www.diabetes.org  © 7319-2112 [employer]. Todos los derechos reservados. Esta información no pretende sustituir la atención médica profesional. Sólo bynum médico puede diagnosticar y tratar un problema de lary.           Patient Education      La diabetes: Consejos para hacer actividades físicas    Aumentar bynum nivel de actividad física puede ayudarle a controlar la diabetes. Los consejos de esta página le ayudarán a aprovechar bynum ejercicio al mecca y a proteger bynum seguridad.  Benefíciese con brío  Las actividades enérgicas aceleran el ritmo cardíaco, lo que puede ayudarle a mejorar bynum forma física, perder el peso excesivo y controlar bynum nivel de azúcar en la barron. Pruebe a caminar con energía y brío. Si tiene problemas en los pies o las piernas, pruebe a hacer natación o montar en bicicleta. Puede dividir masoud sesiones de ejercicio en varios intervalos a lo nova del día. Avance gradualmente hasta hacer por lo menos 30 minutos de ejercicio continuo y enérgico mike todos los nicolle.  Cassy ejercicios de calentamiento y enfriamiento  Los ejercicios de calentamiento y enfriamiento reducen el riesgo de lesiones y el dolor muscular. Antes y después de bynum rutina de ejercicios, cassy destini versión suave de bynum actividad yvette 5 minutos. También puede aprender a hacer estiramientos para mantener los músculos relajados. Bynum proveedor de atención médica puede enseñarle buenos ejercicios de calentamiento y estiramiento.  Cassy la prueba del habla-kasey  La prueba del habla-kasey es destini manera sencilla de medir el esfuerzo que usted hace yvette bynum ejercicio. Si puede hablar mientras hace ejercicios, significa que bynum actividad tiene el ritmo adecuado; jason si le falta el aire, disminuya la marcha. Si puede tararear destini canción, significa que debe acelerar el ritmo. Suba destini timmy, aumente la resistencia de bynum bicicleta estacionaria o nade más rápido.  ¿Qué evelyne hacer respecto a las comidas?  Es posible que le indiquen que planifique bynum actividad física para 1-2 horas después de destini comida. En la mayoría de los casos, no es necesario que coma mientras hace ejercicio. Si usted se pone insulina o kyrie medicamentos que pueden provocar la disminución de los niveles de  azúcar en la barron, hágase el examen antes de hacer ejercicios. Lleve consigo un azúcar de acción rápida, benny tabletas de glucosa o caramelos duros, que le elevará rápidamente el nivel de azúcar en la barron. Si tiene síntomas de hipoglucemia (bajo nivel de azúcar en la barron), use tia azúcar.  Consejos de seguridad  Estos consejos le ayudarán a mantener bynum seguridad mientras mejora bynum forma física:  Cassy ejercicios con un amigo o lleve consigo un teléfono celular, si tiene beatrice.  Lleve o póngase destini identificación, benny destini pulsera o destini wu, que indique que usted tiene diabetes.  Use zapatos y equipo de seguridad apropiados para bynum actividad.  [place] agua antes, yvette y después del ejercicio.  Póngase ropa adecuada para el clima.  No cassy ejercicio a la intemperie si hace demasiado calor o demasiado frío.  No cassy ejercicio si está enfermo.  Si le indican que lo cassy, mídase el azúcar en la barron antes y después de hacer ejercicio. Coma un bocadillo de carbohidrato si bynum nivel de azúcar es bajo antes de iniciar el ejercicio.  Cuándo debe parar y llamar al proveedor de atención médica  Deje de hacer ejercicios y llame a bynum proveedor de atención médica de inmediato si tiene cualquiera de estos síntomas:  Dolor, opresión, sensación de tirantez o pesadez en el pecho.  Dolor o pesadez en el richard, los hombros, la espalda, los brazos, las piernas o los pies  Falta de aire excesiva  Mareos o aturdimiento  Pulso excesivamente rápido o lento  Mayor dolor en las articulaciones o los músculos  Náuseas o vómito  © 0702-0828 [employer]. [address] [place] [address], [place] [address]. Todos los derechos reservados. Esta información no pretende sustituir la atención médica profesional. Sólo bynum médico puede diagnosticar y tratar un problema de lary.              Kidney calculi Abnormal and Uterine Vaginal bleeding

## 2024-03-14 NOTE — H&P PST ADULT - PROBLEM SELECTOR PLAN 1
Patient is scheduled for dilation and curettage with hysteroscopy on 3/20/2024  Written and oral preoperative instructions given to patient with understanding verbalized.   Instructions given to include using 4% chlorhexidine wash as directed starting 3days before day of surgery (inclusive of day of surgery)  Maintaining NPO status post midnight day before surgery  Stopping aspirin, NSAIDs, herbs, vitamins 7days before surgery   Patient is to expect a phone call day before surgery between the hours of 430- 630pm giving arrival time for surgery day.    Patient today with STOP bang Score of 2, Low risk for RENE  Type/Screen drawn here today, results pending

## 2024-03-14 NOTE — H&P PST ADULT - TRANSFUSION PREMEDICATION REQUIRED
Patient is a 75y old  Female who presents with a chief complaint of     9/13/21  HPI:  No new symptoms  No n/v  tolerating CLD    PAST MEDICAL & SURGICAL HISTORY:  Arthritis  rheumatoid    S/P appendectomy  1969        Review of Systems:   CONSTITUTIONAL: No fever, weight loss, or fatigue  EYES: No eye pain, visual disturbances, or discharge  ENMT:  No difficulty hearing, tinnitus, vertigo; No sinus or throat pain  NECK: No pain or stiffness  BREASTS: No pain, masses, or nipple discharge  RESPIRATORY: No cough, wheezing, chills or hemoptysis; No shortness of breath  CARDIOVASCULAR: No chest pain, palpitations, dizziness, or leg swelling  GASTROINTESTINAL: Mild abdominal & epigastric pain. No nausea, vomiting, or hematemesis; No diarrhea or constipation. No melena or hematochezia.  GENITOURINARY: No dysuria, frequency, hematuria, or incontinence  NEUROLOGICAL: No headaches, memory loss, loss of strength, numbness, or tremors  SKIN: No itching, burning, rashes, or lesions   LYMPH NODES: No enlarged glands  ENDOCRINE: No heat or cold intolerance; No hair loss  MUSCULOSKELETAL: No joint pain or swelling; No muscle, back, or extremity pain  PSYCHIATRIC: No depression, anxiety, mood swings, or difficulty sleeping  HEME/LYMPH: No easy bruising, or bleeding gums  ALLERY AND IMMUNOLOGIC: No hives or eczema    Allergies    No Known Allergies    Intolerances        Social History:     FAMILY HISTORY:      MEDICATIONS  (STANDING):  heparin   Injectable 5000 Unit(s) SubCutaneous every 12 hours  influenza   Vaccine 0.5 milliLiter(s) IntraMuscular once  metoclopramide Injectable 5 milliGRAM(s) IV Push once  pantoprazole  Injectable 80 milliGRAM(s) IV Push once  pantoprazole Infusion 8 mG/Hr (10 mL/Hr) IV Continuous <Continuous>    MEDICATIONS  (PRN):  acetaminophen   Tablet .. 650 milliGRAM(s) Oral every 6 hours PRN Temp greater or equal to 38C (100.4F), Moderate Pain (4 - 6)  cholestyramine Powder (Sugar-Free) 4 Gram(s) Oral two times a day PRN Pruritis        CAPILLARY BLOOD GLUCOSE        I&O's Summary    08 Sep 2021 07:01  -  09 Sep 2021 07:00  --------------------------------------------------------  IN: 1380 mL / OUT: 0 mL / NET: 1380 mL    09 Sep 2021 07:01  -  09 Sep 2021 17:06  --------------------------------------------------------  IN: 0 mL / OUT: 0 mL / NET: 0 mL        PHYSICAL EXAM:  Vital Signs Last 24 Hrs  T(C): 36.3 (09 Sep 2021 16:05), Max: 36.8 (09 Sep 2021 04:06)  T(F): 97.3 (09 Sep 2021 16:05), Max: 98.3 (09 Sep 2021 08:48)  HR: 62 (09 Sep 2021 16:55) (61 - 76)  BP: 133/80 (09 Sep 2021 16:55) (110/66 - 148/72)  BP(mean): --  RR: 15 (09 Sep 2021 16:55) (15 - 20)  SpO2: 98% (09 Sep 2021 16:55) (97% - 99%)    GENERAL: NAD, well-developed  HEAD:  Atraumatic, Normocephalic  EYES: EOMI, PERRLA, conjunctiva and sclera clear  NECK: Supple, No JVD  CHEST/LUNG: Clear to auscultation bilaterally; No wheeze  HEART: Regular rate and rhythm; No murmurs, rubs, or gallops  ABDOMEN: Soft, Nontender, Nondistended; Bowel sounds present  EXTREMITIES:  2+ Peripheral Pulses, No clubbing, cyanosis, or edema  PSYCH: AAOx3  NEUROLOGY: non-focal  SKIN: No rashes or lesions    LABS:                        11.7   4.49  )-----------( 173      ( 09 Sep 2021 07:07 )             35.4     09-09    140  |  107  |  14  ----------------------------<  103<H>  3.8   |  19<L>  |  0.77    Ca    10.1      09 Sep 2021 07:07    TPro  6.9  /  Alb  3.6  /  TBili  1.5<H>  /  DBili  0.8<H>  /  AST  158<H>  /  ALT  275<H>  /  AlkPhos  758<H>  09-09    PT/INR - ( 09 Sep 2021 07:07 )   PT: 10.7 sec;   INR: 0.89 ratio                   RADIOLOGY & ADDITIONAL TESTS:    Imaging Personally Reviewed:    Consultant(s) Notes Reviewed:      Care Discussed with Consultants/Other Providers:  
none
prenatal chart

## 2024-03-14 NOTE — H&P PST ADULT - HISTORY OF PRESENT ILLNESS
49year old female with pmhx of anemia, hypertension, headache, calculus of kidney and panic attack and Covid 19 virus presents with c/o episode x 1 15days of continual abnormal vaginal bleeding in January that resulted in patient receiving blood transfusion. Patient is here today for presurgical testing for scheduled dilation and curettage with hysteroscopy on 3/20/2024

## 2024-03-15 PROCEDURE — G0463: CPT

## 2024-03-20 ENCOUNTER — OUTPATIENT (OUTPATIENT)
Dept: OUTPATIENT SERVICES | Facility: HOSPITAL | Age: 49
LOS: 1 days | End: 2024-03-20
Payer: MEDICAID

## 2024-03-20 ENCOUNTER — APPOINTMENT (OUTPATIENT)
Dept: OBGYN | Facility: HOSPITAL | Age: 49
End: 2024-03-20

## 2024-03-20 DIAGNOSIS — Z01.818 ENCOUNTER FOR OTHER PREPROCEDURAL EXAMINATION: ICD-10-CM

## 2024-03-20 DIAGNOSIS — Z98.890 OTHER SPECIFIED POSTPROCEDURAL STATES: Chronic | ICD-10-CM

## 2024-03-20 DIAGNOSIS — N93.9 ABNORMAL UTERINE AND VAGINAL BLEEDING, UNSPECIFIED: ICD-10-CM

## 2024-03-20 LAB — HCG UR QL: NEGATIVE — SIGNIFICANT CHANGE UP

## 2024-03-20 PROCEDURE — 81025 URINE PREGNANCY TEST: CPT

## 2024-03-20 NOTE — REVIEW OF SYSTEMS
[TextEntry] : CARDIOVASCULAR: Negative RESPIRATORY: Negative GASTROINTESTINAL: Negative NEUROLOGICAL: Negative none

## 2024-03-20 NOTE — ASSESSMENT
[FreeTextEntry1] : 49 year old female found to have stable Hypertension, Iron Deficiency Anemia, Vitamin D Deficiency, Elevated Hemoglobin A1c, Chronic Back Pain, Renal Stone, with the current prescription regimen as recommended, diet and lifestyle modifications, as counseled. Prior results reviewed, interpreted and discussed with the patient during today's examination, as appropriate. Follow up, treatment plan and tests, as ordered.   Total time spent:: 25 minutes Including: Preparation prior to visit - Reviewing prior record, results of tests and Consultation Reports as applicable Conducting an appropriate H & P during today's encounter Appropriate orders for tests, medications and procedures, as applicable Counseling patient Note completion

## 2024-03-20 NOTE — HEALTH RISK ASSESSMENT
[No] : In the past 12 months have you used drugs other than those required for medical reasons? No [No falls in past year] : Patient reported no falls in the past year [de-identified] : None [0] : 2) Feeling down, depressed, or hopeless: Not at all (0) [Never] : Never [[id number]] : 0

## 2024-03-20 NOTE — HISTORY OF PRESENT ILLNESS
[de-identified] : 49 year old  female patient with history of stable Hypertension, Iron Deficiency Anemia, Vitamin D Deficiency, Elevated Hemoglobin A1c, Chronic Back Pain, Renal Stone, history as stated, presented for follow up examination. Patient is compliant with all medications. ROS as stated.

## 2024-03-21 ENCOUNTER — APPOINTMENT (OUTPATIENT)
Dept: INTERNAL MEDICINE | Facility: CLINIC | Age: 49
End: 2024-03-21

## 2024-03-25 PROBLEM — G43.829 MENSTRUAL MIGRAINE, NOT INTRACTABLE, WITHOUT STATUS MIGRAINOSUS: Chronic | Status: ACTIVE | Noted: 2024-03-14

## 2024-03-25 PROBLEM — D64.9 ANEMIA, UNSPECIFIED: Chronic | Status: ACTIVE | Noted: 2024-03-14

## 2024-03-25 PROBLEM — F41.0 PANIC DISORDER [EPISODIC PAROXYSMAL ANXIETY]: Chronic | Status: ACTIVE | Noted: 2024-03-14

## 2024-03-25 PROBLEM — N20.0 CALCULUS OF KIDNEY: Chronic | Status: ACTIVE | Noted: 2024-03-14

## 2024-03-25 PROBLEM — J30.2 OTHER SEASONAL ALLERGIC RHINITIS: Chronic | Status: ACTIVE | Noted: 2024-03-14

## 2024-03-25 PROBLEM — U07.1 COVID-19: Chronic | Status: ACTIVE | Noted: 2024-03-14

## 2024-04-04 ENCOUNTER — APPOINTMENT (OUTPATIENT)
Dept: OBGYN | Facility: CLINIC | Age: 49
End: 2024-04-04
Payer: MEDICAID

## 2024-04-04 VITALS
WEIGHT: 185 LBS | DIASTOLIC BLOOD PRESSURE: 91 MMHG | HEART RATE: 80 BPM | BODY MASS INDEX: 28.98 KG/M2 | SYSTOLIC BLOOD PRESSURE: 155 MMHG | OXYGEN SATURATION: 100 %

## 2024-04-04 DIAGNOSIS — N93.9 ABNORMAL UTERINE AND VAGINAL BLEEDING, UNSPECIFIED: ICD-10-CM

## 2024-04-04 PROCEDURE — 99213 OFFICE O/P EST LOW 20 MIN: CPT

## 2024-04-04 NOTE — HISTORY OF PRESENT ILLNESS
[FreeTextEntry1] : patient didnt want to have the surgery 2 wks ago she has AUB she started again spotting  she is traveling to her country and will be back in July

## 2024-04-14 ENCOUNTER — EMERGENCY (EMERGENCY)
Facility: HOSPITAL | Age: 49
LOS: 1 days | Discharge: HOME | End: 2024-04-14
Attending: EMERGENCY MEDICINE
Payer: MEDICAID

## 2024-04-14 VITALS
OXYGEN SATURATION: 99 % | HEIGHT: 67 IN | HEART RATE: 113 BPM | TEMPERATURE: 99 F | DIASTOLIC BLOOD PRESSURE: 107 MMHG | SYSTOLIC BLOOD PRESSURE: 169 MMHG

## 2024-04-14 VITALS
TEMPERATURE: 99 F | OXYGEN SATURATION: 95 % | SYSTOLIC BLOOD PRESSURE: 155 MMHG | DIASTOLIC BLOOD PRESSURE: 90 MMHG | RESPIRATION RATE: 15 BRPM | HEART RATE: 97 BPM

## 2024-04-14 DIAGNOSIS — Z98.890 OTHER SPECIFIED POSTPROCEDURAL STATES: Chronic | ICD-10-CM

## 2024-04-14 LAB
ALBUMIN SERPL ELPH-MCNC: 4 G/DL — SIGNIFICANT CHANGE UP (ref 3.3–5)
ALP SERPL-CCNC: 79 U/L — SIGNIFICANT CHANGE UP (ref 40–120)
ALT FLD-CCNC: 16 U/L — SIGNIFICANT CHANGE UP (ref 10–45)
ANION GAP SERPL CALC-SCNC: 8 MMOL/L — SIGNIFICANT CHANGE UP (ref 5–17)
APPEARANCE UR: CLEAR — SIGNIFICANT CHANGE UP
APTT BLD: 29.8 SEC — SIGNIFICANT CHANGE UP (ref 24.5–35.6)
AST SERPL-CCNC: 20 U/L — SIGNIFICANT CHANGE UP (ref 10–40)
BACTERIA # UR AUTO: NEGATIVE /HPF — SIGNIFICANT CHANGE UP
BASOPHILS # BLD AUTO: 0.02 K/UL — SIGNIFICANT CHANGE UP (ref 0–0.2)
BASOPHILS NFR BLD AUTO: 0.3 % — SIGNIFICANT CHANGE UP (ref 0–2)
BILIRUB SERPL-MCNC: 0.1 MG/DL — LOW (ref 0.2–1.2)
BILIRUB UR-MCNC: NEGATIVE — SIGNIFICANT CHANGE UP
BLD GP AB SCN SERPL QL: NEGATIVE — SIGNIFICANT CHANGE UP
BUN SERPL-MCNC: 12 MG/DL — SIGNIFICANT CHANGE UP (ref 7–23)
CALCIUM SERPL-MCNC: 9.8 MG/DL — SIGNIFICANT CHANGE UP (ref 8.4–10.5)
CAST: 0 /LPF — SIGNIFICANT CHANGE UP (ref 0–4)
CHLORIDE SERPL-SCNC: 105 MMOL/L — SIGNIFICANT CHANGE UP (ref 96–108)
CO2 SERPL-SCNC: 26 MMOL/L — SIGNIFICANT CHANGE UP (ref 22–31)
COLOR SPEC: YELLOW — SIGNIFICANT CHANGE UP
CREAT SERPL-MCNC: 0.6 MG/DL — SIGNIFICANT CHANGE UP (ref 0.5–1.3)
DIFF PNL FLD: ABNORMAL
EGFR: 109 ML/MIN/1.73M2 — SIGNIFICANT CHANGE UP
EOSINOPHIL # BLD AUTO: 0.02 K/UL — SIGNIFICANT CHANGE UP (ref 0–0.5)
EOSINOPHIL NFR BLD AUTO: 0.3 % — SIGNIFICANT CHANGE UP (ref 0–6)
GLUCOSE SERPL-MCNC: 108 MG/DL — HIGH (ref 70–99)
GLUCOSE UR QL: NEGATIVE MG/DL — SIGNIFICANT CHANGE UP
HCG SERPL-ACNC: <2 MIU/ML — SIGNIFICANT CHANGE UP
HCT VFR BLD CALC: 34.4 % — LOW (ref 34.5–45)
HGB BLD-MCNC: 10.6 G/DL — LOW (ref 11.5–15.5)
IMM GRANULOCYTES NFR BLD AUTO: 0.3 % — SIGNIFICANT CHANGE UP (ref 0–0.9)
INR BLD: 1.11 RATIO — SIGNIFICANT CHANGE UP (ref 0.85–1.18)
KETONES UR-MCNC: NEGATIVE MG/DL — SIGNIFICANT CHANGE UP
LEUKOCYTE ESTERASE UR-ACNC: ABNORMAL
LYMPHOCYTES # BLD AUTO: 1.08 K/UL — SIGNIFICANT CHANGE UP (ref 1–3.3)
LYMPHOCYTES # BLD AUTO: 15.9 % — SIGNIFICANT CHANGE UP (ref 13–44)
MCHC RBC-ENTMCNC: 27.2 PG — SIGNIFICANT CHANGE UP (ref 27–34)
MCHC RBC-ENTMCNC: 30.8 GM/DL — LOW (ref 32–36)
MCV RBC AUTO: 88.2 FL — SIGNIFICANT CHANGE UP (ref 80–100)
MONOCYTES # BLD AUTO: 0.41 K/UL — SIGNIFICANT CHANGE UP (ref 0–0.9)
MONOCYTES NFR BLD AUTO: 6 % — SIGNIFICANT CHANGE UP (ref 2–14)
NEUTROPHILS # BLD AUTO: 5.26 K/UL — SIGNIFICANT CHANGE UP (ref 1.8–7.4)
NEUTROPHILS NFR BLD AUTO: 77.2 % — HIGH (ref 43–77)
NITRITE UR-MCNC: NEGATIVE — SIGNIFICANT CHANGE UP
NRBC # BLD: 0 /100 WBCS — SIGNIFICANT CHANGE UP (ref 0–0)
PH UR: 6 — SIGNIFICANT CHANGE UP (ref 5–8)
PLATELET # BLD AUTO: 280 K/UL — SIGNIFICANT CHANGE UP (ref 150–400)
POTASSIUM SERPL-MCNC: 4.2 MMOL/L — SIGNIFICANT CHANGE UP (ref 3.5–5.3)
POTASSIUM SERPL-SCNC: 4.2 MMOL/L — SIGNIFICANT CHANGE UP (ref 3.5–5.3)
PROT SERPL-MCNC: 7.1 G/DL — SIGNIFICANT CHANGE UP (ref 6–8.3)
PROT UR-MCNC: SIGNIFICANT CHANGE UP MG/DL
PROTHROM AB SERPL-ACNC: 11.6 SEC — SIGNIFICANT CHANGE UP (ref 9.5–13)
RBC # BLD: 3.9 M/UL — SIGNIFICANT CHANGE UP (ref 3.8–5.2)
RBC # FLD: 15.4 % — HIGH (ref 10.3–14.5)
RBC CASTS # UR COMP ASSIST: 600 /HPF — HIGH (ref 0–4)
RH IG SCN BLD-IMP: POSITIVE — SIGNIFICANT CHANGE UP
SODIUM SERPL-SCNC: 139 MMOL/L — SIGNIFICANT CHANGE UP (ref 135–145)
SP GR SPEC: 1.02 — SIGNIFICANT CHANGE UP (ref 1–1.03)
SQUAMOUS # UR AUTO: 1 /HPF — SIGNIFICANT CHANGE UP (ref 0–5)
UROBILINOGEN FLD QL: 0.2 MG/DL — SIGNIFICANT CHANGE UP (ref 0.2–1)
WBC # BLD: 6.81 K/UL — SIGNIFICANT CHANGE UP (ref 3.8–10.5)
WBC # FLD AUTO: 6.81 K/UL — SIGNIFICANT CHANGE UP (ref 3.8–10.5)
WBC UR QL: 2 /HPF — SIGNIFICANT CHANGE UP (ref 0–5)

## 2024-04-14 PROCEDURE — 76830 TRANSVAGINAL US NON-OB: CPT

## 2024-04-14 PROCEDURE — 85025 COMPLETE CBC W/AUTO DIFF WBC: CPT

## 2024-04-14 PROCEDURE — 80053 COMPREHEN METABOLIC PANEL: CPT

## 2024-04-14 PROCEDURE — 36415 COLL VENOUS BLD VENIPUNCTURE: CPT

## 2024-04-14 PROCEDURE — 86900 BLOOD TYPING SEROLOGIC ABO: CPT

## 2024-04-14 PROCEDURE — 85730 THROMBOPLASTIN TIME PARTIAL: CPT

## 2024-04-14 PROCEDURE — 85610 PROTHROMBIN TIME: CPT

## 2024-04-14 PROCEDURE — 93975 VASCULAR STUDY: CPT

## 2024-04-14 PROCEDURE — 84702 CHORIONIC GONADOTROPIN TEST: CPT

## 2024-04-14 PROCEDURE — 99285 EMERGENCY DEPT VISIT HI MDM: CPT | Mod: 25

## 2024-04-14 PROCEDURE — 86850 RBC ANTIBODY SCREEN: CPT

## 2024-04-14 PROCEDURE — 86901 BLOOD TYPING SEROLOGIC RH(D): CPT

## 2024-04-14 PROCEDURE — 87086 URINE CULTURE/COLONY COUNT: CPT

## 2024-04-14 PROCEDURE — 93975 VASCULAR STUDY: CPT | Mod: 26

## 2024-04-14 PROCEDURE — 81001 URINALYSIS AUTO W/SCOPE: CPT

## 2024-04-14 PROCEDURE — 76830 TRANSVAGINAL US NON-OB: CPT | Mod: 26

## 2024-04-14 PROCEDURE — 99283 EMERGENCY DEPT VISIT LOW MDM: CPT

## 2024-04-14 PROCEDURE — 99285 EMERGENCY DEPT VISIT HI MDM: CPT

## 2024-04-14 RX ORDER — TRANEXAMIC ACID 100 MG/ML
1300 INJECTION, SOLUTION INTRAVENOUS ONCE
Refills: 0 | Status: COMPLETED | OUTPATIENT
Start: 2024-04-14 | End: 2024-04-14

## 2024-04-14 RX ORDER — TRANEXAMIC ACID 100 MG/ML
2 INJECTION, SOLUTION INTRAVENOUS
Qty: 30 | Refills: 0
Start: 2024-04-14 | End: 2024-04-18

## 2024-04-14 RX ADMIN — TRANEXAMIC ACID 1300 MILLIGRAM(S): 100 INJECTION, SOLUTION INTRAVENOUS at 19:19

## 2024-04-14 NOTE — ED ADULT TRIAGE NOTE - AS TEMP SITE
Spoke to patient and confirmed the following:  Start Ergocalciferol 50,000 units weekly x8 weeks then maintenance cholecalciferol 2000 international unit(s) qd     oral

## 2024-04-14 NOTE — ED PROVIDER NOTE - OBJECTIVE STATEMENT
48 y/o female, hx of HTN, presents to the ER for vaginal bleeding.  has been having her menstrual period every other month. [facility] last time she had her menstrual period was february 2024.  has had abnormal vaginal bleeding since January 2024 after Endometrial biopsy in december 2023, had transfusion in jan due to anemia. [facility] she was scheduled for D/C but did not have it done as she was nervous.  had her LMP in february then started again over the past 9 days. [facility] at times notices small clots.  has been using about 2 pads an hour. denies f/n/v/d, CP, SOB, HA, dizziness, abdominal pain, urinary symptoms.

## 2024-04-14 NOTE — ED PROVIDER NOTE - PHYSICAL EXAMINATION
pelvic exam done. minimal active bleeding noted. no clots present. no ttp during exam. no CMT. no adnexal tenderness.

## 2024-04-14 NOTE — ED PROVIDER NOTE - NSFOLLOWUPCLINICS_GEN_ALL_ED_FT
[facility] Gynecology and Obstetrics  Gynceology/OB  [address] [place] [address]  Phone: [phone]  Fax:   Follow Up Time: 1-3 Days

## 2024-04-14 NOTE — CONSULT NOTE ADULT - ASSESSMENT
A/P 48 y  PMH AUB presenting with heavy vaginal bleeding and symptomatic anemia. Transvaginal ultrasound findings show endometrial lining of >2cm with elongated cystic mass in the adnexa. Differential for AUB includes structural lesion (polyp) vs. adenomyosis vs. anovulatory bleeding in mehnaz-menopausal woman. Patient is hemodynamically stable with light-moderate vaginal bleeding currently. Labs are notable for stable H/H. Vital signs stable.      - No acute GYN intervention at this time  - H/H 10.6/34.4  - Possible hydrosalpinx vs hematosalpinx on sono likely 2/2 retrograde menses. This structure was seen at prior ED visit in December.   - D/c home with TXA 1300mg TID x5d  - Pt to f/u with Dr. Clemons for D&C    D/w GYN service attending Dr. Serjio Yepez PGY2

## 2024-04-14 NOTE — ED PROVIDER NOTE - PROGRESS NOTE DETAILS
Radha George PA-C: spoke with gyn. will come see patient. Radha George PA-C: spoke with gyn. recommend giving TXA 1300mg TID for 5 days. will give first dose here. patient to follow up with gyn. patient in agreement with plan. well appearing. stable for discharge. discussed with ED attending

## 2024-04-14 NOTE — ED PROVIDER NOTE - ATTENDING APP SHARED VISIT CONTRIBUTION OF CARE
48 y/o female, hx of HTN, presents to the ER for vaginal bleeding Which she says started about a week ago going through several pads an hour mild symptoms of symptomatic anemia was black did have a blood transfusion several months back follow-up with OB/GYN after being on Avastin.  Patient needed so pelvic exam ultrasound labs consider restarting Avastin and OB/GYN follow-up found to be fibroid all in nature on prior ultrasound.
known

## 2024-04-14 NOTE — CONSULT NOTE ADULT - ATTENDING COMMENTS
Pt w/ PMB p/w VB. H/H stable. VSS stable. Needs Dilation and Curettage outpatient for sampling prior to tx. Had prcedure scheduled but patient canceled last moment. Rec TXA for acute episode but highly recommend she f/u with her GYN to reschedule Dilation and Curettage as cancer may be cause for dx. No acute intervention at this time.     I have personally seen, examined, and participated in the care of this patient. I have reviewed all pertinent clinical information, including history, physical exam, plan, and the Resident 's note and agree except as noted.    Moy Bassett MD Pt w/ known hx of AUB p/w VB. H/H stable. VSS stable. PE with light bleeding at this time. Needs Dilation and Curettage outpatient for sampling prior to tx. Had prcedure scheduled but patient canceled last moment. Rec TXA for acute episode but highly recommend she f/u with her GYN to reschedule Dilation and Curettage as cancer may be cause for dx. No acute intervention at this time.     I have personally seen, examined, and participated in the care of this patient. I have reviewed all pertinent clinical information, including history, physical exam, plan, and the Resident 's note and agree except as noted.    Moy Bassett MD

## 2024-04-14 NOTE — CONSULT NOTE ADULT - SUBJECTIVE AND OBJECTIVE BOX
GYN Consult Note    SERJIO GREGG  49y  Female [id number]    HPI:    47yo  LMP 24 with PMSHx significant for abnormal uterine bleeding, fibroids, and bilateral tubal ligation vs salpingectomy presenting with heavy vaginal bleeding x9 days with associated lightheadedness and palpitations. She has been saturating a pad every 2 hours. Patient reports regular, monthly menses through February, but then did not have a period in March. Of note, pt was scheduled for a D&C in March with her gynecologist Dr. Clemons but the patient changed her mind and did not have her scheduled surgery. Prior to her surgery, patient had an endometrial biopsy in December with benign pathology. Pt was previously seen for AUB in the ED in January, at which time she was given Aygestin. Pt also reports being given TXA by her gynecologist in the past which worked well for her. Today pt denies nausea, vomiting, SOB, abdominal/pelvic pain.    Name of Ob/Gyn Physician: Dr. Clemons  OBHx:  x3  GynHx: bilateral salpingectomy vs tubal ligation, endometriosis, fibroids  PMHx: HTN, hx kidney stones  PSHx: s/p bilateral salpingectomy vs tubal ligation (20 yr ago), lithotripsy  Meds: Oral Fe; prescribed amlodipine but does not take it  All: Penicillin (unknown)      Vital Signs Last 24 Hrs  T(C): 37.2 (2024 14:59), Max: 37.2 (2024 14:59)  T(F): 98.9 (2024 14:59), Max: 98.9 (2024 14:59)  HR: 91 (2024 14:59) (91 - 113)  BP: 163/93 (2024 14:59) (163/93 - 169/107)  BP(mean): --  RR: 16 (2024 14:59) (16 - 16)  SpO2: 99% (2024 14:59) (99% - 99%)    Parameters below as of 2024 14:59  Patient On (Oxygen Delivery Method): room air        Physical Exam:   General: sitting comfortably in bed, NAD   HEENT: full ROM  CV: well perfused  Lungs: breathing comfortably on RA  Abd: Soft, non-tender, non-distended.     :  pad 40% saturated after 1h.    External labia wnl.  Bimanual exam with no cervical motion tenderness, uterus retroverted and wnl, adnexa non palpable b/l.  Cervix dilated  Speculum Exam: 5cc of blood in vault. No active bleeding from os.   Ext: non-tender b/l, no edema     LABS:                        10.6   6.81  )-----------( 280      ( 2024 12:40 )             34.4         139  |  105  |  12  ----------------------------<  108<H>  4.2   |  26  |  0.60    Ca    9.8      2024 12:40    TPro  7.1  /  Alb  4.0  /  TBili  0.1<L>  /  DBili  x   /  AST  20  /  ALT  16  /  AlkPhos  79      I&O's Detail    PT/INR - ( 2024 12:40 )   PT: 11.6 sec;   INR: 1.11 ratio         PTT - ( 2024 12:40 )  PTT:29.8 sec  Urinalysis Basic - ( 2024 12:40 )    Color: x / Appearance: x / SG: x / pH: x  Gluc: 108 mg/dL / Ketone: x  / Bili: x / Urobili: x   Blood: x / Protein: x / Nitrite: x   Leuk Esterase: x / RBC: x / WBC x   Sq Epi: x / Non Sq Epi: x / Bacteria: x        RADIOLOGY & ADDITIONAL STUDIES:  < from: US Doppler Pelvis (24 @ 15:12) >  ACC: [id number] EXAM:  US DPLX PELVIC   ORDERED BY: SAMANTA CARTER     ACC: [id number] EXAM:  US TRANSVAGINAL   ORDERED BY: SAMANTA CARTER     PROCEDURE DATE:  2024          INTERPRETATION:  CLINICAL INFORMATION: Vaginal bleeding    LMP: 2024    COMPARISON: Pelvic ultrasound 2024    TECHNIQUE:  Endovaginal pelvic sonogram as per order. Transabdominal pelvic sonogram   was also performed as part of our standard protocol. Color and Spectral   Doppler was performed.    FINDINGS:  Uterus: 8.5 cm x 6.8 cm x 7.0 cm. Enlarged with Venetian blind artifact.   Multiple nabothian cysts measuring up to 0.9 cm  Endometrium: Prominent endometrial stripe measuring approximately 2.3 cm.    Right ovary: 2.7 x 2.4 x 2.5 cm. Normal arterial and venous waveforms.   Elongated, tubular cystic structure is seen with various echogenic   material. There is a fluid fluid level, seen on series 1570. Dominant   follicular cyst measuring 2.6 cm  Left ovary: 3.9 cm x 3.2 cm x 2.7 cm. Difficult to fully evaluate due to   deep positioning.. Normal arterial and venous waveforms. Redemonstrated   is a cystic-appearing elongated structures directly adjacent to the left   ovary    Fluid: Trace fluid in the cul-de-sac.    IMPRESSION:    Elongated tubular cystic structure of the right adnexa with echogenic   material, with fluid fluid level, concerning for hematosalpinx.    Redemonstrated elongated left adnexal structure which may represent a   paraovarian cyst versus hydrosalpinx.    Adenomyosis.    --- End of Report ---          AMALIA DAVIS MD; Resident Radiologist  This document has been electronically signed.  BRIAN TOM MD; Attending Radiologist  This document has been electronically signed. 2024  4:30PM    < end of copied text >

## 2024-04-14 NOTE — ED ADULT NURSE NOTE - NSFALLUNIVINTERV_ED_ALL_ED
Bed/Stretcher in lowest position, wheels locked, appropriate side rails in place/Call bell, personal items and telephone in reach/Instruct patient to call for assistance before getting out of bed/chair/stretcher/Non-slip footwear applied when patient is off stretcher/[place] to call system/Physically safe environment - no spills, clutter or unnecessary equipment/Purposeful proactive rounding/Room/bathroom lighting operational, light cord in reach

## 2024-04-14 NOTE — ED ADULT NURSE NOTE - OBJECTIVE STATEMENT
Pt came in c/o menstrual period for 9 days. Pt states she has hx: extended menses related to her menopause. Pt also states she has a hx: blood transfusion. Pt is alert and orientedX4, Pt is ambulatory. Breathing easy and non labored. Pt is calm and cooperative. Pt states she came to the hospital because she is travelling in 2 days. Pt's  at bedside.

## 2024-04-14 NOTE — ED ADULT NURSE REASSESSMENT NOTE - NS ED NURSE REASSESS COMMENT FT1
Report received from Jim ALMENDAREZ. Pt A&Ox4, NAD, resp nonlabored, skin warm/dry, resting comfortably in bed with family at bedside. Pt presented to ED c/o vaginal bleeding. Pt denies headache, dizziness, chest pain, palpitations, SOB, abd pain, n/v/d, urinary symptoms, fevers, chills, weakness at this time. Pt awaiting US results. Pt denies any needs at this time.

## 2024-04-14 NOTE — ED PROVIDER NOTE - PATIENT PORTAL LINK FT
You can access the FollowMyHealth Patient Portal offered by [facility] by registering at the following website: [email or web address]. By joining [email or web address]’s FollowMyHealth portal, you will also be able to view your health information using other applications (apps) compatible with our system.

## 2024-04-14 NOTE — ED PROVIDER NOTE - NSFOLLOWUPINSTRUCTIONS_ED_ALL_ED_FT
1. It is important to follow up with your primary care doctor in 1-2 days    follow up with your gyn in 1-2 days     2. bring a copy of all your results to your follow up appointments    3.  medication from the pharmacy and take as instructed.     4. if your symptoms worsen, persist, or if any new symptoms develop, or if you experience any signs of distress, return to the ER right away.

## 2024-04-15 LAB
CULTURE RESULTS: SIGNIFICANT CHANGE UP
SPECIMEN SOURCE: SIGNIFICANT CHANGE UP

## 2024-06-26 NOTE — ED PROVIDER NOTE - CCCP TRG CHIEF CMPLNT
"[facility] Counsel[facility]                                     Progress Note    Patient Name: Craig Gusman  Date: 06/26/2024         Service Type: Individual      Session Start Time: 11.00  Session End Time: 11.28     Session Length: 28    Session #: 07    Attendees: Client attended alone    Service Modality:  Video Visit:      Provider verified identity through the following two step process.  Patient provided:  Patient is known previously to provider    Telemedicine Visit: The patient's condition can be safely assessed and treated via synchronous audio and visual telemedicine encounter.      Reason for Telemedicine Visit: Patient has requested telehealth visit    Originating Site (Patient Location): Patient's home    Distant Site (Provider Location): Provider Remote Setting- Home Office    Consent:  The patient/guardian has verbally consented to: the potential risks and benefits of telemedicine (video visit) versus in person care; bill my insurance or make self-payment for services provided; and responsibility for payment of non-covered services.     Patient would like the video invitation sent by:  My Chart    Mode of Communication:  Video Conference via [employer]    Distant Location (Provider):  Off-site    As the provider I attest to compliance with applicable laws and regulations related to telemedicine.    DATA  Interactive Complexity: No  Crisis: No        Progress Since Last Session (Related to Symptoms / Goals / Homework):   Symptoms: No change pt reported no change    Homework: Completed in session review patient's  application of behavior activation strategies and positively reinforced observable changes.      Episode of Care Goals: Satisfactory progress - ACTION (Actively working towards change); Intervened by reinforcing change plan / affirming steps taken     Current / Ongoing Stressors and Concerns:  The reason for seeking services at this time is: \"Depression, some anxiety\". Patient shared " "that he lost his job during COVID and has been on unemployment for a while and then got a new job with the [employer] and has not been able to start because background check is pending for months now. Patient noted living in uncertainty with \"no motivation to do little basic things\" Patient added that his wife had a miscarriage last October and they have been struggling with having a pregnancy since they got  about 5 years ago.      Current: Today, patient reported an improvement in mood since last session. Patient noted that he is still going through orientation and reported feeling happy at current job with what he is doing and the positive support from co-staff. He noted he has a good work life balance and that this is the first job he has felt really happy and comfortable with since graduating from collage.      Treatment Objective(s) Addressed in This Session:   Decrease frequency and intensity of feeling down, depressed, hopeless  Patient will increase daily activity level by exercising for 20-30 minutes and participate in at least 1 daily pleasant/fun activities.  Patient will identify specific areas of cognitive distortion and challenge irrational thoughts with reality.      Intervention:    CBT: Continue work today with patient  on the thinking feeling connection and  demonstrate how thoughts and behaviours are hard-wired to emotional and physiological feelings.Model process in session to help patient identify, interrupt and changed unhelpful thinking and behavioral patterns using a cross sectional CBT model. Handout provided.       ASSESSMENT: Current Emotional / Mental Status (status of significant symptoms):   Risk status (Self / Other harm or suicidal ideation)   Patient denies current fears or concerns for personal safety.   Patient denies current or recent suicidal ideation or behaviors.   Patient denies current or recent homicidal ideation or behaviors.   Patient denies current or " "recent self injurious behavior or ideation.   Patient denies other safety concerns.   Patient reports there has been no change in risk factors since their last session.     Patient reports there has been no change in protective factors since their last session.     Recommended that patient call 911 or go to the local ED should there be a change in any of these risk factors.     Appearance:   Appropriate    Eye Contact:   Good    Psychomotor Behavior: Normal    Attitude:   Cooperative  Interested   Orientation:   All   Speech    Rate / Production: Normal/ Responsive    Volume:  Normal    Mood:    Depressed    Affect:    Appropriate    Thought Content:  Clear    Thought Form:  Coherent  Goal Directed    Insight:    Good      Medication Review:   No changes to current psychiatric medication(s)     Medication Compliance:   Yes     Changes in Health Issues:   None reported     Chemical Use Review:   Substance Use: Chemical use reviewed, no active concerns identified      Tobacco Use: No change in amount of tobacco use since last session.  Patient assessed present costs and future losses as a result of smoking    Diagnosis:  296.32 (F33.1) Major Depressive Disorder, Recurrent Episode, Moderate _ and With atypical features    Collateral Reports Completed:   Not Applicable    PLAN: (Patient Tasks / Therapist Tasks / Other)    Increase daily physical and achievement  activities and use provided daily log to rate your depression, pleasant feelings, and sense of achievement BEFORE  and AFTER the activity.    Work on provided \"CBT cross sectional model \" thought diary handout        AZIZA Floyd    ----- Service Performed and Documented by AZIZA------  This note was reviewed and clinical supervision by CLAIR Riley [facility]    6/26/2024         ______________________________________________________________________    Individual Treatment Plan    Patient's Name: Craig Gusman  YOB: 1992    Date of " "Creation: 04/03/2024  Date Treatment Plan Last Reviewed/Revised:     DSM5 Diagnoses: 296.32 (F33.1) Major Depressive Disorder, Recurrent Episode, Moderate _ and With atypical features  Psychosocial / Contextual Factors: unemployment  PROMIS (reviewed every 90 days):     Referral / Collaboration:  Referral to another professional/service is not indicated at this time..    Anticipated number of session for this episode of care:  15-25  Anticipation frequency of session: Biweekly  Anticipated Duration of each session: 38-52 minutes  Treatment plan will be reviewed in 90 days or when goals have been changed.       MeasurableTreatment Goal(s) related to diagnosis / functional impairment(s)    Goal 1: Patient will identify and address specific triggers to feelings of depression and improve coping by  90 % in the next three months.      I will know I've met my goal when I am no longer in the [place] that I am in right now and less depressed and not putting a lot of mental efforts into basic activities\"     Objective #A (Patient Action)    Patient will increase daily activity level by exercising for 20-30 minutes and participate in at least 1 daily pleasant/fun activities.  Status: Continued - Date(s): 07/03/2024    Intervention(s)  Therapist will provide psychoeducation, behavioral activation, and cognitive restructuring.    Objective #B  Patient will identify specific areas of cognitive distortion and challenge irrational thoughts with reality.   Status: Continued - Date(s): 07/03/2024       Intervention(s)  Therapist will provide psychoeducation, behavioral activation, and cognitive restructuring.    Objective #A (Patient Action)    Patient will maintain a regular sleep and wake pattern daily.  Status: Continued - Date(s): 07/03/2024    Intervention(s)  Therapist will provide psychoeducation, behavioral activation, and cognitive restructuring.    Objective #C  Patient will learn and practice relaxation techniques to " manage depression.  Status: Continued - Date(s):  07/03/2024      Intervention(s)  Therapist will provide psychoeducation, behavioral activation, and cognitive restructuring.      Patient has reviewed and agreed to the above plan.      AZIZA Floyd  April 3, 2024    Answers submitted by the patient for this visit:  MALVIN-7 (Submitted on 3/27/2024)  MALVIN 7 TOTAL SCORE: 12     hematuria

## 2024-08-21 NOTE — ED ADULT TRIAGE NOTE - GLASGOW COMA SCALE: BEST MOTOR RESPONSE, MLM
24  Beth John : 1950 Sex: female  Age: 73 y.o.    Chief Complaint   Patient presents with    Back Pain       HPI:   Sore from right shoulder blade down to right knee.   Sitting the other night, hip locked up.  Once up and moving she was fine.  Just sore now.       Current Outpatient Medications:     Tirzepatide (MOUNJARO) 2.5 MG/0.5ML SOPN SC injection, Inject 0.5 mLs into the skin once a week, Disp: 12 each, Rfl: 1    insulin glargine (LANTUS SOLOSTAR) 100 UNIT/ML injection pen, Inject 60 Units into the skin every morning, Disp: 15 Adjustable Dose Pre-filled Pen Syringe, Rfl: 1    vitamin D (ERGOCALCIFEROL) 1.25 MG (04157 UT) CAPS capsule, Take 1 capsule by mouth once a week, Disp: , Rfl:     omeprazole (PRILOSEC) 20 MG delayed release capsule, Take 1 capsule by mouth daily, Disp: , Rfl:     Continuous Glucose Sensor (DEXCOM G7 SENSOR) MISC, Use as directed, Disp: 3 each, Rfl: 11    Continuous Glucose  (DEXCOM G7 ) WILY, Us as directed, Disp: 1 each, Rfl: 0    insulin lispro, 1 Unit Dial, (HUMALOG KWIKPEN) 100 UNIT/ML SOPN, Inject up to 12 units QAC TID, Disp: 6 Adjustable Dose Pre-filled Pen Syringe, Rfl: 1    lisinopril (PRINIVIL;ZESTRIL) 20 MG tablet, Take 1 tablet by mouth daily, Disp: 90 tablet, Rfl: 3    metFORMIN (GLUCOPHAGE) 1000 MG tablet, Take 1 tablet by mouth 2 times daily (with meals), Disp: 180 tablet, Rfl: 1    zolpidem (AMBIEN) 10 MG tablet, Take 1 tablet by mouth nightly as needed for Sleep for up to 90 days., Disp: 90 tablet, Rfl: 0    atorvastatin (LIPITOR) 40 MG tablet, Take 1 tablet by mouth daily, Disp: 90 tablet, Rfl: 1    Magnesium 400 MG TABS, Take 400 mg by mouth 2 times daily, Disp: 180 tablet, Rfl: 1    aspirin 81 MG EC tablet, Take 1 tablet by mouth daily, Disp: , Rfl:     Insulin Pen Needle 31G X 5 MM MISC, 1 each by Does not apply route daily, Disp: , Rfl:     Exam:   Vitals:    24 1456   Pulse: 79   Temp: 97.3 °F (36.3 °C)   SpO2: 97% 
Patient is here for follow up back pain. Tae Husain MD  Electronically signed by Angela Figueroa LPN on 8/21/2024 at 2:58 PM    
(M6) obeys commands

## 2024-09-05 ENCOUNTER — APPOINTMENT (OUTPATIENT)
Dept: INTERNAL MEDICINE | Facility: CLINIC | Age: 49
End: 2024-09-05

## 2024-10-24 ENCOUNTER — APPOINTMENT (OUTPATIENT)
Dept: INTERNAL MEDICINE | Facility: CLINIC | Age: 49
End: 2024-10-24
Payer: MEDICAID

## 2024-10-24 VITALS
DIASTOLIC BLOOD PRESSURE: 88 MMHG | HEIGHT: 67 IN | HEART RATE: 86 BPM | OXYGEN SATURATION: 99 % | RESPIRATION RATE: 16 BRPM | BODY MASS INDEX: 29.98 KG/M2 | SYSTOLIC BLOOD PRESSURE: 151 MMHG | TEMPERATURE: 98.3 F | WEIGHT: 191 LBS

## 2024-10-24 DIAGNOSIS — I10 ESSENTIAL (PRIMARY) HYPERTENSION: ICD-10-CM

## 2024-10-24 DIAGNOSIS — Z12.39 ENCOUNTER FOR OTHER SCREENING FOR MALIGNANT NEOPLASM OF BREAST: ICD-10-CM

## 2024-10-24 DIAGNOSIS — E78.5 HYPERLIPIDEMIA, UNSPECIFIED: ICD-10-CM

## 2024-10-24 DIAGNOSIS — R73.09 OTHER ABNORMAL GLUCOSE: ICD-10-CM

## 2024-10-24 DIAGNOSIS — D50.9 IRON DEFICIENCY ANEMIA, UNSPECIFIED: ICD-10-CM

## 2024-10-24 PROCEDURE — 99213 OFFICE O/P EST LOW 20 MIN: CPT | Mod: 25

## 2024-10-24 NOTE — ASSESSMENT
[FreeTextEntry1] : 49 year old female found to have stable Hypertension, Hyperlipidemia, Iron Deficiency Anemia, Vitamin D Deficiency, Elevated Hemoglobin A1c, Chronic Back Pain, Renal Stone, with the current prescription regimen as recommended, diet and lifestyle modifications, as counseled. Prior results reviewed, interpreted and discussed with the patient during today's examination, as appropriate. Follow up, treatment plan and tests, as ordered.   Total time spent:: 25 minutes Including: Preparation prior to visit - Reviewing prior record, results of tests and Consultation Reports as applicable Conducting an appropriate H & P during today's encounter Appropriate orders for tests, medications and procedures, as applicable Counseling patient Note completion

## 2024-10-24 NOTE — HISTORY OF PRESENT ILLNESS
[de-identified] : 49 year old  female patient with history of stable Hypertension, Hyperlipidemia, Iron Deficiency Anemia, Vitamin D Deficiency, Elevated Hemoglobin A1c, Chronic Back Pain, Renal Stone, history as stated, presented for follow up examination. Patient is compliant with all medications. ROS as stated.

## 2024-10-24 NOTE — HISTORY OF PRESENT ILLNESS
[de-identified] : 49 year old  female patient with history of stable Hypertension, Hyperlipidemia, Iron Deficiency Anemia, Vitamin D Deficiency, Elevated Hemoglobin A1c, Chronic Back Pain, Renal Stone, history as stated, presented for follow up examination. Patient is compliant with all medications. ROS as stated.

## 2024-10-25 LAB
ALBUMIN SERPL ELPH-MCNC: 4.6 G/DL
ALP BLD-CCNC: 96 U/L
ALT SERPL-CCNC: 21 U/L
ANION GAP SERPL CALC-SCNC: 18 MMOL/L
AST SERPL-CCNC: 20 U/L
BASOPHILS # BLD AUTO: 0.02 K/UL
BASOPHILS NFR BLD AUTO: 0.4 %
BILIRUB SERPL-MCNC: 0.3 MG/DL
BUN SERPL-MCNC: 16 MG/DL
CALCIUM SERPL-MCNC: 9.8 MG/DL
CHLORIDE SERPL-SCNC: 104 MMOL/L
CHOLEST SERPL-MCNC: 201 MG/DL
CO2 SERPL-SCNC: 20 MMOL/L
CREAT SERPL-MCNC: 0.65 MG/DL
EGFR: 107 ML/MIN/1.73M2
EOSINOPHIL # BLD AUTO: 0.04 K/UL
EOSINOPHIL NFR BLD AUTO: 0.9 %
ESTIMATED AVERAGE GLUCOSE: 120 MG/DL
GGT SERPL-CCNC: 20 U/L
GLUCOSE SERPL-MCNC: 90 MG/DL
HBA1C MFR BLD HPLC: 5.8 %
HCT VFR BLD CALC: 43.7 %
HDLC SERPL-MCNC: 59 MG/DL
HGB BLD-MCNC: 13.7 G/DL
IMM GRANULOCYTES NFR BLD AUTO: 0 %
IRON SATN MFR SERPL: 15 %
IRON SERPL-MCNC: 70 UG/DL
LDLC SERPL CALC-MCNC: 126 MG/DL
LYMPHOCYTES # BLD AUTO: 1.2 K/UL
LYMPHOCYTES NFR BLD AUTO: 26 %
MAN DIFF?: NORMAL
MCHC RBC-ENTMCNC: 28.5 PG
MCHC RBC-ENTMCNC: 31.4 GM/DL
MCV RBC AUTO: 90.9 FL
MONOCYTES # BLD AUTO: 0.28 K/UL
MONOCYTES NFR BLD AUTO: 6.1 %
NEUTROPHILS # BLD AUTO: 3.08 K/UL
NEUTROPHILS NFR BLD AUTO: 66.6 %
NONHDLC SERPL-MCNC: 142 MG/DL
PLATELET # BLD AUTO: 253 K/UL
POTASSIUM SERPL-SCNC: 4.5 MMOL/L
PROT SERPL-MCNC: 7.6 G/DL
RBC # BLD: 4.81 M/UL
RBC # FLD: 14.9 %
SODIUM SERPL-SCNC: 143 MMOL/L
TIBC SERPL-MCNC: 463 UG/DL
TRIGL SERPL-MCNC: 88 MG/DL
UIBC SERPL-MCNC: 392 UG/DL
WBC # FLD AUTO: 4.62 K/UL

## 2024-11-15 NOTE — ED ADULT TRIAGE NOTE - PAIN: PRESENCE, MLM
Upper respiratory infection/bronchitis -   Rapid strep was negative in clinic today  Medrol Dosepak, take as directed  Albuterol, 1 to 2 puffs every 6 hours as needed  Tessalon for cough, every 8 hours as needed  Drink plenty fluids  Please see below for additional symptomatic recommendations:  Call or return to clinic if no improvement or any worsening  If any significant worsening go to the nearest emergency room    Thank you for visiting [facility] today    Nasal Congestion:  Flonase or Nasonex (over the counter) nasal spray, once a day  Normal saline nasal spray  Afrin nasal spray no longer than three days  If you have high blood pressure, take Coricidin HBP (or the generic form) instead. Follow instructions on the box  Cough:  Throat lozenges, hot tea, and honey may help  Vicks VapoRub at night to help with cough and relieve muscles aches and pain  If not prescribed a cough medication, Robitussin DM is an option. It is an over the counter cough medication containing dextromethorphan to help suppress cough at night   *Please only take when absolutely needed, as this is a controlled substance that can cause addiction   *Please only take cough syrup at nighttime as it causes drowsiness   *Do not drive or operate any machinery while taking this medication  Chest Congestion:  For thick mucus, take Mucinex (with Guafenesin only) to help thin the mucus. Follow instructions on the box. You will need to drink plenty of water with this medication  Sore Throat:  Lozenges, as needed. Cepacol lozenges will help numb the throat  Chloraseptic spray also helps to numb throat pain  Salt water gargles (1/4 tsp salt in 8 oz of water) to soothe throat pain  Sinus pain/pressure:  Warm, wet towel on face to help with facial sinus pain/pressure  Headache/Pain Fever/Body Aches:  If you can take NSAIDs, take Ibuprofen 400-600 mg every 8 hours as needed  If you cannot take NSAIDs, take Tylenol 325-500 mg every 6 hours as 
denies pain/discomfort (Rating = 0)

## 2025-01-06 ENCOUNTER — APPOINTMENT (OUTPATIENT)
Dept: OBGYN | Facility: CLINIC | Age: 50
End: 2025-01-06

## 2025-01-06 ENCOUNTER — APPOINTMENT (OUTPATIENT)
Dept: OBGYN | Facility: CLINIC | Age: 50
End: 2025-01-06
Payer: MEDICAID

## 2025-01-06 ENCOUNTER — NON-APPOINTMENT (OUTPATIENT)
Age: 50
End: 2025-01-06

## 2025-01-06 VITALS
OXYGEN SATURATION: 98 % | BODY MASS INDEX: 29.98 KG/M2 | SYSTOLIC BLOOD PRESSURE: 153 MMHG | RESPIRATION RATE: 16 BRPM | WEIGHT: 191 LBS | HEIGHT: 67 IN | DIASTOLIC BLOOD PRESSURE: 85 MMHG | TEMPERATURE: 98.3 F | HEART RATE: 94 BPM

## 2025-01-06 PROCEDURE — 99213 OFFICE O/P EST LOW 20 MIN: CPT

## 2025-01-11 ENCOUNTER — INPATIENT (INPATIENT)
Facility: HOSPITAL | Age: 50
LOS: 3 days | Discharge: HOME HEALTH | DRG: 603 | End: 2025-01-15
Attending: STUDENT IN AN ORGANIZED HEALTH CARE EDUCATION/TRAINING PROGRAM | Admitting: STUDENT IN AN ORGANIZED HEALTH CARE EDUCATION/TRAINING PROGRAM
Payer: MEDICAID

## 2025-01-11 VITALS
RESPIRATION RATE: 20 BRPM | SYSTOLIC BLOOD PRESSURE: 148 MMHG | WEIGHT: 195.11 LBS | HEART RATE: 99 BPM | DIASTOLIC BLOOD PRESSURE: 91 MMHG | OXYGEN SATURATION: 100 % | HEIGHT: 66 IN | TEMPERATURE: 98 F

## 2025-01-11 DIAGNOSIS — Z98.890 OTHER SPECIFIED POSTPROCEDURAL STATES: Chronic | ICD-10-CM

## 2025-01-11 LAB
ALBUMIN SERPL ELPH-MCNC: 4 G/DL — SIGNIFICANT CHANGE UP (ref 3.3–5)
ALP SERPL-CCNC: 95 U/L — SIGNIFICANT CHANGE UP (ref 40–120)
ALT FLD-CCNC: 16 U/L — SIGNIFICANT CHANGE UP (ref 10–45)
ANION GAP SERPL CALC-SCNC: 12 MMOL/L — SIGNIFICANT CHANGE UP (ref 5–17)
AST SERPL-CCNC: 16 U/L — SIGNIFICANT CHANGE UP (ref 10–40)
BASOPHILS # BLD AUTO: 0.02 K/UL — SIGNIFICANT CHANGE UP (ref 0–0.2)
BASOPHILS NFR BLD AUTO: 0.2 % — SIGNIFICANT CHANGE UP (ref 0–2)
BILIRUB SERPL-MCNC: 0.2 MG/DL — SIGNIFICANT CHANGE UP (ref 0.2–1.2)
BUN SERPL-MCNC: 11 MG/DL — SIGNIFICANT CHANGE UP (ref 7–23)
CALCIUM SERPL-MCNC: 9.7 MG/DL — SIGNIFICANT CHANGE UP (ref 8.4–10.5)
CHLORIDE SERPL-SCNC: 105 MMOL/L — SIGNIFICANT CHANGE UP (ref 96–108)
CO2 SERPL-SCNC: 22 MMOL/L — SIGNIFICANT CHANGE UP (ref 22–31)
CREAT SERPL-MCNC: 0.59 MG/DL — SIGNIFICANT CHANGE UP (ref 0.5–1.3)
EGFR: 109 ML/MIN/1.73M2 — SIGNIFICANT CHANGE UP
EOSINOPHIL # BLD AUTO: 0.05 K/UL — SIGNIFICANT CHANGE UP (ref 0–0.5)
EOSINOPHIL NFR BLD AUTO: 0.5 % — SIGNIFICANT CHANGE UP (ref 0–6)
GAS PNL BLDV: SIGNIFICANT CHANGE UP
GAS PNL BLDV: SIGNIFICANT CHANGE UP
GLUCOSE SERPL-MCNC: 92 MG/DL — SIGNIFICANT CHANGE UP (ref 70–99)
HCT VFR BLD CALC: 33.6 % — LOW (ref 34.5–45)
HGB BLD-MCNC: 10.8 G/DL — LOW (ref 11.5–15.5)
HIV 1 & 2 AB SERPL IA.RAPID: SIGNIFICANT CHANGE UP
IMM GRANULOCYTES NFR BLD AUTO: 0.4 % — SIGNIFICANT CHANGE UP (ref 0–0.9)
LACTATE SERPL-SCNC: 2.1 MMOL/L — HIGH (ref 0.5–2)
LYMPHOCYTES # BLD AUTO: 1.56 K/UL — SIGNIFICANT CHANGE UP (ref 1–3.3)
LYMPHOCYTES # BLD AUTO: 16.5 % — SIGNIFICANT CHANGE UP (ref 13–44)
MCHC RBC-ENTMCNC: 29.6 PG — SIGNIFICANT CHANGE UP (ref 27–34)
MCHC RBC-ENTMCNC: 32.1 G/DL — SIGNIFICANT CHANGE UP (ref 32–36)
MCV RBC AUTO: 92.1 FL — SIGNIFICANT CHANGE UP (ref 80–100)
MONOCYTES # BLD AUTO: 0.67 K/UL — SIGNIFICANT CHANGE UP (ref 0–0.9)
MONOCYTES NFR BLD AUTO: 7.1 % — SIGNIFICANT CHANGE UP (ref 2–14)
NEUTROPHILS # BLD AUTO: 7.12 K/UL — SIGNIFICANT CHANGE UP (ref 1.8–7.4)
NEUTROPHILS NFR BLD AUTO: 75.3 % — SIGNIFICANT CHANGE UP (ref 43–77)
NRBC # BLD: 0 /100 WBCS — SIGNIFICANT CHANGE UP (ref 0–0)
PLATELET # BLD AUTO: 296 K/UL — SIGNIFICANT CHANGE UP (ref 150–400)
POTASSIUM SERPL-MCNC: 4.4 MMOL/L — SIGNIFICANT CHANGE UP (ref 3.5–5.3)
POTASSIUM SERPL-SCNC: 4.4 MMOL/L — SIGNIFICANT CHANGE UP (ref 3.5–5.3)
PROT SERPL-MCNC: 7.5 G/DL — SIGNIFICANT CHANGE UP (ref 6–8.3)
RBC # BLD: 3.65 M/UL — LOW (ref 3.8–5.2)
RBC # FLD: 12.7 % — SIGNIFICANT CHANGE UP (ref 10.3–14.5)
SODIUM SERPL-SCNC: 139 MMOL/L — SIGNIFICANT CHANGE UP (ref 135–145)
WBC # BLD: 9.46 K/UL — SIGNIFICANT CHANGE UP (ref 3.8–10.5)
WBC # FLD AUTO: 9.46 K/UL — SIGNIFICANT CHANGE UP (ref 3.8–10.5)

## 2025-01-11 PROCEDURE — 99223 1ST HOSP IP/OBS HIGH 75: CPT

## 2025-01-11 RX ORDER — SODIUM CHLORIDE 9 MG/ML
1000 INJECTION, SOLUTION INTRAMUSCULAR; INTRAVENOUS; SUBCUTANEOUS ONCE
Refills: 0 | Status: COMPLETED | OUTPATIENT
Start: 2025-01-11 | End: 2025-01-11

## 2025-01-11 RX ORDER — DOXYCYCLINE MONOHYDRATE 100 MG
TABLET ORAL
Refills: 0 | Status: DISCONTINUED | OUTPATIENT
Start: 2025-01-11 | End: 2025-01-12

## 2025-01-11 RX ORDER — ACETAMINOPHEN 80 MG/.8ML
975 SOLUTION/ DROPS ORAL ONCE
Refills: 0 | Status: COMPLETED | OUTPATIENT
Start: 2025-01-11 | End: 2025-01-11

## 2025-01-11 RX ORDER — CEFAZOLIN SODIUM 1 G
1000 VIAL (EA) INJECTION EVERY 8 HOURS
Refills: 0 | Status: DISCONTINUED | OUTPATIENT
Start: 2025-01-11 | End: 2025-01-13

## 2025-01-11 RX ORDER — LIDOCAINE HYDROCHLORIDE 10 MG/ML
10 INJECTION INFILTRATION; PERINEURAL ONCE
Refills: 0 | Status: COMPLETED | OUTPATIENT
Start: 2025-01-11 | End: 2025-01-11

## 2025-01-11 RX ORDER — ACETAMINOPHEN 80 MG/.8ML
650 SOLUTION/ DROPS ORAL EVERY 6 HOURS
Refills: 0 | Status: DISCONTINUED | OUTPATIENT
Start: 2025-01-11 | End: 2025-01-15

## 2025-01-11 RX ORDER — CEFAZOLIN SODIUM 1 G
VIAL (EA) INJECTION
Refills: 0 | Status: DISCONTINUED | OUTPATIENT
Start: 2025-01-11 | End: 2025-01-13

## 2025-01-11 RX ORDER — DOXYCYCLINE MONOHYDRATE 100 MG
100 TABLET ORAL ONCE
Refills: 0 | Status: COMPLETED | OUTPATIENT
Start: 2025-01-11 | End: 2025-01-11

## 2025-01-11 RX ORDER — ACETAMINOPHEN 80 MG/.8ML
1000 SOLUTION/ DROPS ORAL ONCE
Refills: 0 | Status: DISCONTINUED | OUTPATIENT
Start: 2025-01-11 | End: 2025-01-11

## 2025-01-11 RX ORDER — DOXYCYCLINE MONOHYDRATE 100 MG
100 TABLET ORAL EVERY 12 HOURS
Refills: 0 | Status: DISCONTINUED | OUTPATIENT
Start: 2025-01-12 | End: 2025-01-12

## 2025-01-11 RX ORDER — CEFAZOLIN SODIUM 1 G
1000 VIAL (EA) INJECTION ONCE
Refills: 0 | Status: COMPLETED | OUTPATIENT
Start: 2025-01-11 | End: 2025-01-11

## 2025-01-11 RX ADMIN — ACETAMINOPHEN 975 MILLIGRAM(S): 80 SOLUTION/ DROPS ORAL at 16:41

## 2025-01-11 RX ADMIN — Medication 100 MILLIGRAM(S): at 17:24

## 2025-01-11 RX ADMIN — SODIUM CHLORIDE 1000 MILLILITER(S): 9 INJECTION, SOLUTION INTRAMUSCULAR; INTRAVENOUS; SUBCUTANEOUS at 20:02

## 2025-01-11 RX ADMIN — LIDOCAINE HYDROCHLORIDE 10 MILLILITER(S): 10 INJECTION INFILTRATION; PERINEURAL at 17:20

## 2025-01-11 RX ADMIN — SODIUM CHLORIDE 1000 MILLILITER(S): 9 INJECTION, SOLUTION INTRAMUSCULAR; INTRAVENOUS; SUBCUTANEOUS at 21:19

## 2025-01-11 RX ADMIN — Medication 100 MILLIGRAM(S): at 17:45

## 2025-01-11 NOTE — ED CDU PROVIDER DISPOSITION NOTE - CLINICAL COURSE
49 year old woman pmh HTN presenting with 4 abscesses for the past 6 days. THey all started around the same time, one on the right thigh, one on the LLQ, one in the right arm pit, and right shin. The right thigh and LLQ are the largest and most painful and have been draining bloody fluid. SHe has never had an abscess or lump before ever. Denies fevers but felt some chills. Den[place] recent travel, from [place] but bol[place]'t been there since july. DEnies chest pain, sob, abd pain, n/v/d/c, dysuria.  ED Course: Patient started on cefazolin and doxycycline  CDU Course: ____ 49 year old woman pmh HTN presenting with 4 abscesses for the past 6 days. THey all started around the same time, one on the right thigh, one on the LLQ, one in the right arm pit, and right shin. The right thigh and LLQ are the largest and most painful and have been draining bloody fluid. SHe has never had an abscess or lump before ever. Denies fevers but felt some chills. Denies recent travel, from the [place] but [place]'t been there since july. DEnies chest pain, sob, abd pain, n/v/d/c, dysuria.  ED Course: Patient started on cefazolin and doxycycline  CDU Course, patient was HD stable however with persistent large area of induration with drainage of the right thigh. she was seen by ID who recommended cultures, changing doxy to vanc and then plan for admission. patient will get US. d/w Dr. Gannon

## 2025-01-11 NOTE — ED PROVIDER NOTE - PHYSICAL EXAMINATION
Physical Exam:  Gen: no acute distress, AOx3, nontoxic appearing, able to ambulate without assistance  Head: NCAT  HEENT: EOMI, PEERLA, normal conjunctiva, tongue midline, oral mucosa moist  Lung: CTAB, no respiratory distress, no wheezes/rhonchi/rales B/L, speaking in full sentences  CV: RRR, no murmurs, rubs or gallops  Abd: soft, NT, ND, no guarding, no rigidity, no rebound tenderness, no CVA tenderness  MSK: no visible deformities, ROM normal in UE/LE, no neck / back pain, calf tenderness  Neuro: No focal sensory or motor deficits in cranial nerves, upper and lower extremities  Skin: Warm, well perfused, no leg swelling, ulcerated area of erythema, tenderness fluctuance on right thigh, smaller one on LLQ, small papules in right armpit and right shin that are tender without fluctuance.

## 2025-01-11 NOTE — ED CDU PROVIDER INITIAL DAY NOTE - PHYSICAL EXAMINATION
GEN: Pt in NAD, nontoxic.  PSYCH: Affect appropriate.  EYES: Sclera white w/o injection.   ENT: Head NCAT. Neck supple FROM.  RESP: CTA b/l, no wheezes, rales, or rhonchi.   CARDIAC: RRR, clear distinct S1, S2, no appreciable murmurs.  ABD: Abdomen soft, non-tender.   VASC: No edema or calf tenderness.  MSK: Moving b/l UE and LE equally without pain.  SKIN: R medial proximal thigh, 16cm+ in diameter, Approx 6-7cm diameter region of induration, excoriation with trace blood, no purulent drainage, no induration. Smaller region fo erythema approx 8cm in diameter LLQ with small central induration, no fluctuance, eschar vs healing excoriation. R axilla with approx 1cm region of induration with central fluctuance, drained approx 1-2cc of purulent- blood-tinged material expressed, small region of surrounding erythema. Additional punctate region of healing excoriation no right loweral lower leg, proximal 1/3 without drainage or appreciable surrounding erythema. No crepitus.

## 2025-01-11 NOTE — ED PROVIDER NOTE - OBJECTIVE STATEMENT
49 year old woman pmh HTN presenting with 4 abscesses for the past 6 days. THey all started around the same time, one on the right thigh, one on the LLQ, one in the right arm pit, and right shin. The right thigh and LLQ are the largest and most painful and have been draining bloody fluid. SHe has never had an abscess or lump before ever. Denies fevers but felt some chills. Denies recent travel, from the [place] but [place]'t been there since july. DEnies chest pain, sob, abd pain, n/v/d/c, dysuria.

## 2025-01-11 NOTE — ED CDU PROVIDER INITIAL DAY NOTE - PROGRESS NOTE DETAILS
Patient is declining CAT scan with contrast at this time, she indicates that she had palpitations in the past.  We discussed that a noncontrasted study is unlikely to be helpful however patient is unwilling to obtain IV contrast despite that it is not a allergy.  Will order ultrasound.  Infectious disease is recommending switching to Vanco and canceling Doxy.  Cultures sent.  Will require admission at this time.  Patient was updated.  Discussed with Dr. Gannon

## 2025-01-11 NOTE — ED CDU PROVIDER INITIAL DAY NOTE - OBJECTIVE STATEMENT
49 year old woman pmh HTN presenting with 4 abscesses for the past 6 days. THey all started around the same time, one on the right thigh, one on the LLQ, one in the right arm pit, and right shin. The right thigh and LLQ are the largest and most painful and have been draining bloody fluid. SHe has never had an abscess or lump before ever. Denies fevers but felt some chills. Denies recent travel, from the [place] but [place]'t been there since july. DEnies chest pain, sob, abd pain, n/v/d/c, dysuria.  ED Course: Patient started on cefazolin and doxycycline

## 2025-01-11 NOTE — ED PROVIDER NOTE - PROGRESS NOTE DETAILS
Vannesa ANTHONY PGY1: No drainable collection seen in either the thigh or LLQ abscess. pus collected from armpit nodule and sent for culture.

## 2025-01-11 NOTE — ED CDU PROVIDER DISPOSITION NOTE - ATTENDING APP SHARED VISIT CONTRIBUTION OF CARE
SAUL: I , Dr Rosita Gannon have seen and evaluated the patient. Results of labs, tests, imaging have been reviewed. The patient reports improvement in symptoms, area of erythema to inner thigh receding. Culture from draining thigh wound sent. Affected areas all indurated and/or spontaneously draining. Blood cultures and MRSA swab added. ID consulted. Pt will be DC on abx. The patient is stable for discharge home and will follow up with their primary physician. SAUL: I , Dr Rosita Gannon have seen and evaluated the patient. Results of labs, tests, imaging have been reviewed. The patient reports improvement in symptoms, area of erythema to inner thigh receding. Culture from draining thigh wound sent. Affected areas all indurated and/or spontaneously draining. Blood cultures and MRSA swab added. ID consulted. Rec Vanc for improved MRSA coverage and surg c/s. Pt TBA for IV abx and monitoring.

## 2025-01-11 NOTE — ED ADULT NURSE NOTE - OBJECTIVE STATEMENT
50 yo F pt hx of HTN non compliant with medication p/w 6 days of abscesses one to RLE inner thigh, LLQ, RLE lateral calf and R axilla. pt endorsing RLE medial thigh has worsened with redness and serosanguinous drainage and chills.  pt is A&Ox4, MAEW, RLE medial abscess with serosanguinous drainage, LLQ abscess with no surrounding erythema but open with serosanguinous drainage and surrounding erythema, abd soft non tender, other abscesses closed with no drainage/erythema.  Pt denies headache, dizziness, chest pain, palpitations, cough, SOB, abdominal pain, n/v/d, urinary symptoms, weakness at this time.  pt Bahamian speaking with  #[id number] name Lori

## 2025-01-11 NOTE — ED CDU PROVIDER DISPOSITION NOTE - NSFOLLOWUPINSTRUCTIONS_ED_ALL_ED_FT
1. Follow-up with your PCP in 2-3 days.    2. Take cephalexin and doxycycline as prescribed. Finish the full course of your antibiotic, do not skip doses.    3. Rest and elevate affected area. Take Motrin 600mg every 8 hours with food for pain.     4. Return to ED if you experience any worsening redness, swelling, streaking (red lines), fever, chills, or any other concerning symptoms.

## 2025-01-11 NOTE — ED CDU PROVIDER INITIAL DAY NOTE - ATTENDING APP SHARED VISIT CONTRIBUTION OF CARE
49 year old woman pmh HTN presenting with 4 abscesses for the past 6 days. THey all started around the same time, one on the right thigh, one on the LLQ, one in the right arm pit, and right shin. The right thigh and LLQ are the largest and most painful and have been draining bloody fluid sent to cdu for iv abx no abscess collection seen on bedside us with induration to not i&d at that time.

## 2025-01-11 NOTE — ED PROVIDER NOTE - ATTENDING CONTRIBUTION TO CARE
49 year old woman pmh HTN presenting with 4 abscesses for the past 6 days.  Patient denies any injections to the sites any shaving but run along her groin region in her underarm the largest of which is on her right thigh which is already ulcerated and drained just indurated ultrasound performed with no fluid collection there but surrounding redness erythema to the medial thigh labs are within normal limits vital signs stable IV antibiotics were ordered consider CDU stay for several doses of IV antibiotics

## 2025-01-11 NOTE — ED PROVIDER NOTE - CLINICAL SUMMARY MEDICAL DECISION MAKING FREE TEXT BOX
49 year old woman pmh HTN presenting with 4 abscesses for the past 6 days, no systemic signs of infection. but multiple sites concerning for hidradenitis suppurativa with 2 drainable abscess, lower suspicion for systemic illness. cbc, cmp, lactate, pain control, drain abscesses, derm referall.

## 2025-01-12 DIAGNOSIS — L02.91 CUTANEOUS ABSCESS, UNSPECIFIED: ICD-10-CM

## 2025-01-12 DIAGNOSIS — Z98.891 HISTORY OF UTERINE SCAR FROM PREVIOUS SURGERY: Chronic | ICD-10-CM

## 2025-01-12 DIAGNOSIS — Z29.9 ENCOUNTER FOR PROPHYLACTIC MEASURES, UNSPECIFIED: ICD-10-CM

## 2025-01-12 DIAGNOSIS — I10 ESSENTIAL (PRIMARY) HYPERTENSION: ICD-10-CM

## 2025-01-12 DIAGNOSIS — R19.7 DIARRHEA, UNSPECIFIED: ICD-10-CM

## 2025-01-12 DIAGNOSIS — L03.90 CELLULITIS, UNSPECIFIED: ICD-10-CM

## 2025-01-12 LAB
A1C WITH ESTIMATED AVERAGE GLUCOSE RESULT: 5.8 % — HIGH (ref 4–5.6)
ESTIMATED AVERAGE GLUCOSE: 120 MG/DL — HIGH (ref 68–114)
GRAM STN FLD: SIGNIFICANT CHANGE UP
MRSA PCR RESULT.: DETECTED
S AUREUS DNA NOSE QL NAA+PROBE: DETECTED
SPECIMEN SOURCE: SIGNIFICANT CHANGE UP

## 2025-01-12 PROCEDURE — 99222 1ST HOSP IP/OBS MODERATE 55: CPT | Mod: 57,25

## 2025-01-12 PROCEDURE — 99223 1ST HOSP IP/OBS HIGH 75: CPT | Mod: GC

## 2025-01-12 PROCEDURE — G0545: CPT

## 2025-01-12 PROCEDURE — 76882 US LMTD JT/FCL EVL NVASC XTR: CPT | Mod: 26,RT

## 2025-01-12 PROCEDURE — 99233 SBSQ HOSP IP/OBS HIGH 50: CPT

## 2025-01-12 PROCEDURE — 99222 1ST HOSP IP/OBS MODERATE 55: CPT

## 2025-01-12 PROCEDURE — 10060 I&D ABSCESS SIMPLE/SINGLE: CPT

## 2025-01-12 RX ORDER — ONDANSETRON 4 MG/1
4 TABLET ORAL EVERY 6 HOURS
Refills: 0 | Status: DISCONTINUED | OUTPATIENT
Start: 2025-01-12 | End: 2025-01-15

## 2025-01-12 RX ORDER — VANCOMYCIN HYDROCHLORIDE 5 G/100ML
1000 INJECTION, POWDER, LYOPHILIZED, FOR SOLUTION INTRAVENOUS EVERY 12 HOURS
Refills: 0 | Status: DISCONTINUED | OUTPATIENT
Start: 2025-01-12 | End: 2025-01-14

## 2025-01-12 RX ORDER — SODIUM CHLORIDE 9 MG/ML
500 INJECTION, SOLUTION INTRAVENOUS
Refills: 0 | Status: DISCONTINUED | OUTPATIENT
Start: 2025-01-12 | End: 2025-01-13

## 2025-01-12 RX ORDER — MUPIROCIN 2 %
1 OINTMENT (GRAM) TOPICAL
Refills: 0 | Status: DISCONTINUED | OUTPATIENT
Start: 2025-01-12 | End: 2025-01-15

## 2025-01-12 RX ORDER — GINKGO BILOBA 40 MG
3 CAPSULE ORAL AT BEDTIME
Refills: 0 | Status: DISCONTINUED | OUTPATIENT
Start: 2025-01-12 | End: 2025-01-15

## 2025-01-12 RX ADMIN — Medication 100 MILLIGRAM(S): at 16:11

## 2025-01-12 RX ADMIN — SODIUM CHLORIDE 50 MILLILITER(S): 9 INJECTION, SOLUTION INTRAVENOUS at 21:12

## 2025-01-12 RX ADMIN — Medication 1 APPLICATION(S): at 17:30

## 2025-01-12 RX ADMIN — Medication 100 MILLIGRAM(S): at 09:01

## 2025-01-12 RX ADMIN — VANCOMYCIN HYDROCHLORIDE 250 MILLIGRAM(S): 5 INJECTION, POWDER, LYOPHILIZED, FOR SOLUTION INTRAVENOUS at 17:30

## 2025-01-12 RX ADMIN — ACETAMINOPHEN 650 MILLIGRAM(S): 80 SOLUTION/ DROPS ORAL at 21:24

## 2025-01-12 RX ADMIN — Medication 100 MILLIGRAM(S): at 00:24

## 2025-01-12 RX ADMIN — Medication 1000 MILLIGRAM(S): at 01:00

## 2025-01-12 RX ADMIN — Medication 100 MILLIGRAM(S): at 05:53

## 2025-01-12 NOTE — ED CDU PROVIDER SUBSEQUENT DAY NOTE - PHYSICAL EXAMINATION
GEN: Pt non-toxic in NAD, alert.  PSYCH: Affect and mood appropriate.  EYES: Sclera white w/o injection.   ENT: Airway patent.  RESP: CTA b/l.  CARDIAC: RRR, S1, S2, no M/G/R.  ABD: Soft, NT.  MSK: DELA CRUZ spontaneously.  NEURO: Nonfocal.  VASC: Strong distal pulses. No edema.  SKIN: R medial proximal thigh, 16cm+ in diameter, Approx 6-7cm diameter region of induration, excoriation with trace blood, no purulent drainage, no induration. Smaller region fo erythema approx 8cm in diameter LLQ with small central induration, no fluctuance, eschar vs healing excoriation. R axilla with approx 1cm region of induration with central fluctuance, drained approx 1-2cc of purulent- blood-tinged material expressed, small region of surrounding erythema. Additional punctate region of healing excoriation no right loweral lower leg, proximal 1/3 without drainage or appreciable surrounding erythema. No crepitus.

## 2025-01-12 NOTE — H&P ADULT - PROBLEM SELECTOR PLAN 1
-2x weeks of evolving wounds/abscesses  -HR:100 on admission  -MRSA PCR: +  -Lactate: 2.6 -> 1.6 ->1.3  -Pt refused CT scan  -Ultrasound:     PLAN:  >c/w vancomycin and cefazolin (s/p doxycycline 1/11-  >f/u BCx: NGTD  >f/u Wound Cx: NGTD, negative gram stain -2x weeks of evolving wounds/abscesses  -HR:100 on admission  -MRSA PCR: +  -A1c: 5.8%, HIV: negative  -Lactate: 2.6 -> 1.6 ->1.3  -Pt refused CT scan  -Ultrasound: abscess collection in the right medial thigh measures 4.1 x 1.5 x 4.0 cm.   -Hx of waxing in the affected regions; though does note that some skin lesions started prior to waxing  -Ddx: contact dermatitis with superimposed MRSA bacterial infection +/- hidradenitis suppurativa     PLAN:  >c/w vancomycin and cefazolin (s/p doxycycline 1/11-  >f/u BCx: NGTD  >f/u Wound Cx: NGTD, negative gram stain  >f/u ID recs  >f/u general surgery consult for possible further drainage -2x weeks of evolving wounds/abscesses  -HR:100 on admission  -MRSA PCR: +  -A1c: 5.8%, HIV: negative  -Lactate: 2.6 -> 1.6 ->1.3  -Pt refused CT scan  -Ultrasound: abscess collection in the right medial thigh measures 4.1 x 1.5 x 4.0 cm.   -Hx of waxing in the affected regions; though does note that some skin lesions started prior to waxing  -Hirsutism noted on physical examination  -Ddx: contact dermatitis with superimposed MRSA bacterial infection +/- hidradenitis suppurativa     PLAN:  >c/w vancomycin and cefazolin (s/p doxycycline 1/11-  >f/u BCx: NGTD  >f/u Wound Cx: NGTD, negative gram stain  >f/u ID recs  >f/u general surgery consult for possible further drainage -2x weeks of evolving wounds/abscesses  -HR:100 on admission  -MRSA PCR: +  -A1c: 5.8%, HIV: negative  -Lactate: 2.6 -> 1.6 ->1.3  -Pt refused CT scan  -Ultrasound: abscess collection in the right medial thigh measures 4.1 x 1.5 x 4.0 cm.   -Hx of waxing in the affected regions; though does note that some skin lesions started prior to waxing  -Hirsutism noted on physical examination  -Ddx: contact dermatitis with superimposed MRSA bacterial infection +/- hidradenitis suppurativa     PLAN:  >c/w vancomycin and cefazolin (s/p doxycycline 1/11-  >f/u BCx: NGTD  >f/u Wound Cx: NGTD, negative gram stain  >f/u ID recs  >f/u general surgery consult for possible further drainage  >Plan to consult dermatology in AM for possible HS -2x weeks of evolving wounds/abscesses  -HR:100 on admission  -MRSA PCR: +  -A1c: 5.8%, HIV: negative  -Lactate: 2.6 -> 1.6 ->1.3  -Pt refused CT scan  -Ultrasound: abscess collection in the right medial thigh measures 4.1 x 1.5 x 4.0 cm.   -Hx of waxing in the affected regions; though does note that some skin lesions started prior to waxing  -Hirsutism noted on physical examination  -s/p I&D of R thigh lesion on 01/12  -Ddx: furuncle/boil vs contact dermatitis with superimposed MRSA bacterial infection vs hidradenitis suppurativa     PLAN:  >c/w vancomycin and cefazolin (s/p doxycycline 1/11-  >f/u BCx: NGTD  >f/u expressed Wound Cx: NGTD, negative gram stain  >f/u I&D Wound Cx  >f/u ID recs  >c/w 50cc LR x10H

## 2025-01-12 NOTE — CONSULT NOTE ADULT - ASSESSMENT
Impression/Hospital Course:  49 year old woman pmh HTN, Kidney stones with hx of lithotripsy, anemia presenting with 4 abscesses for the past 6 days. They all started around the same time, one on the right thigh, one on the LLQ, one in the right arm pit, and right shin. The right thigh and LLQ are the largest and most painful and have been draining bloody fluid. SHe has never had an abscess or lump before ever. Upon work up patinet afebrile and had 1x episode of HR of 100. Labs showed WBc 9.46 and H/H 10.8/33.6, and lactic acid 2.1 to 1.3, HIV negative and A1c 5.8. Patient under went I&D of R axillary which drained 1-2 cc blood tinged purulence which was sent for culture which gram stain doesn't show any organisms though culture still pending.    Antimicrobials:  doxycycline 1/11 - current  cefazolin 1/11 - current    Assessment:  *Multiple abscess like collections, may be infectious though abscesses on multiple different anatomical locations at the same time are atypical for infection unless there is a form of disseminated infection. Hydradenitis suppurativa?   *Anemia  *Lactic acidosis   *Pre-DM     Recommendations: PLEASE DEFER ALL CHANGES IN PLAN UNTIL SIGNED BY ATTENDING. All recommendations are tentative pending Attending Attestation.  - continue cefazolin  - d/c doxycycline  - start vancomycin as it is a better anti MRSA agent compared to tetracycline  - obtain MRSA screen  - obtain blood culture x2  - consider dermatology evaluation  - trend temperature and WBC   - follow blood and abscess cultures (received by lab with results pending), adjust antimicrobial therapy based off of culture and sensitivity     Dieudonne Kraft DO, PGY-5   Infectious Disease Fellow  [employer] Teams Preferred  After 5pm/weekends call [phone]  Impression/Hospital Course:  49 year old woman pmh HTN, Kidney stones with hx of lithotripsy, anemia presenting with 4 abscesses for the past 6 days. They all started around the same time, one on the right thigh, one on the LLQ, one in the right arm pit, and right shin. The right thigh and LLQ are the largest and most painful and have been draining bloody fluid. SHe has never had an abscess or lump before ever. Upon work up patinet afebrile and had 1x episode of HR of 100. Labs showed WBc 9.46 and H/H 10.8/33.6, and lactic acid 2.1 to 1.3, HIV negative and A1c 5.8. Patient under went I&D of R axillary which drained 1-2 cc blood tinged purulence which was sent for culture which gram stain doesn't show any organisms though culture still pending.    Antimicrobials:  doxycycline 1/11 - current  cefazolin 1/11 - current    Assessment:  *Multiple abscess like collections, likely initially a contact dermatitis to waxing strip that became super infected particularly in the RLE  *Anemia  *Lactic acidosis   *Pre-DM     Recommendations:   - continue cefazolin  - d/c doxycycline  - start vancomycin as it is a better anti MRSA agent compared to tetracycline  - obtain MRSA screen  - obtain blood culture x2  - surgery consultation as the R proximal lower extremity and possibly LLQ areas likely need I&D, please obtain cultures from I&D procedure  - trend temperature and WBC   - follow blood and abscess cultures (received by lab with results pending), adjust antimicrobial therapy based off of culture and sensitivity     Dieudonne Kraft DO, PGY-5   Infectious Disease Fellow  [employer] Teams Preferred  After 5pm/weekends call [phone]  Impression/Hospital Course:  49 year old woman pmh HTN, Kidney stones with hx of lithotripsy, anemia presenting with 4 abscesses for the past 6 days. They all started around the same time, one on the right thigh, one on the LLQ, one in the right arm pit, and right shin. The right thigh and LLQ are the largest and most painful and have been draining bloody fluid. SHe has never had an abscess or lump before ever. Upon work up patinet afebrile and had 1x episode of HR of 100. Labs showed WBc 9.46 and H/H 10.8/33.6, and lactic acid 2.1 to 1.3, HIV negative and A1c 5.8. Patient under went I&D of R axillary which drained 1-2 cc blood tinged purulence which was sent for culture which gram stain doesn't show any organisms though culture still pending.    Antimicrobials:  doxycycline 1/11 - current  cefazolin 1/11 - current    Assessment:  *Multiple abscess like collections, likely initially a contact dermatitis to waxing strip that became super infected particularly in the RLE  *Anemia  *Lactic acidosis   *Pre-DM     Recommendations:   - continue cefazolin  - d/c doxycycline  - start vancomycin as it is a better anti MRSA agent compared to tetracycline  - obtain MRSA screen  - obtain blood culture x2  - surgery consultation as the R proximal lower extremity and possibly LLQ areas likely need I&D, please obtain cultures from I&D procedure  - if patient develops diarrhea would obtain GI PCR as her  recently had a diarrheal illness  - trend temperature and WBC   - follow blood and abscess cultures (received by lab with results pending), adjust antimicrobial therapy based off of culture and sensitivity     Dieudonne Kraft DO, PGY-5   Infectious Disease Fellow  [employer] Teams Preferred  After 5pm/weekends call [phone]

## 2025-01-12 NOTE — CONSULT NOTE ADULT - SUBJECTIVE AND OBJECTIVE BOX
Patient is a 49y old  Female who presents with a chief complaint of abscesses    HPI:  49 year old woman pmh HTN, Kidney stones with hx of lithotripsy, anemia presenting with 4 abscesses for the past 6 days. They all started around the same time, one on the right thigh, one on the LLQ, one in the right arm pit, and right shin. The right thigh and LLQ are the largest and most painful and have been draining bloody fluid. SHe has never had an abscess or lump before ever. Upon work up patinet afebrile and had 1x episode of HR of 100. Labs showed WBc 9.46 and H/H 10.8/33.6, and lactic acid 2.1 to 1.3, HIV negative and A1c 5.8. Patient under went I&D of R axillary which drained 1-2 cc blood tinged purulence which was sent for culture which gram stain doesn't show any organisms though culture still pending.    REVIEW OF SYSTEMS  pending full examination    prior hospital charts reviewed [V]  primary team notes reviewed [V]  other consultant notes reviewed [V]    PAST MEDICAL & SURGICAL HISTORY:  HTN (hypertension)      Kidney stone      Panic attack      Calculus of kidney      Anemia      Menstrual headache      2019 novel coronavirus disease (COVID-19)      Seasonal allergies      History of lithotripsy          SOCIAL HISTORY:  Denied smoking/vaping/alcohol/recreational drug use    FAMILY HISTORY:      Allergies  penicillin (Unknown)  aspirin (Anaphylaxis)  epinephrine (Anaphylaxis)  Mushrooms (Anaphylaxis)  IODIZED SODIUM (Anaphylaxis)        ANTIMICROBIALS:  ceFAZolin   IVPB 1000 every 8 hours  ceFAZolin   IVPB    doxycycline IVPB    doxycycline IVPB 100 every 12 hours      ANTIMICROBIALS (past 90 days):  MEDICATIONS  (STANDING):  ceFAZolin   IVPB   100 mL/Hr IV Intermittent (01-11-25 @ 17:24)    ceFAZolin   IVPB   100 mL/Hr IV Intermittent (01-12-25 @ 09:01)   100 mL/Hr IV Intermittent (01-12-25 @ 00:24)    doxycycline IVPB   100 mL/Hr IV Intermittent (01-11-25 @ 17:45)    doxycycline IVPB   100 mL/Hr IV Intermittent (01-12-25 @ 05:53)        OTHER MEDS:   MEDICATIONS  (STANDING):  acetaminophen     Tablet .. 650 every 6 hours PRN  amLODIPine   Tablet 5 daily      VITALS:  Vital Signs Last 24 Hrs  T(F): 98.5 (01-12-25 @ 07:34), Max: 99.1 (01-11-25 @ 19:55)    Vital Signs Last 24 Hrs  HR: 87 (01-12-25 @ 07:34) (73 - 100)  BP: 120/76 (01-12-25 @ 07:34) (120/76 - 176/96)  RR: 17 (01-12-25 @ 07:34)  SpO2: 99% (01-12-25 @ 07:34) (96% - 100%)  Wt(kg): --    EXAM:  pending full examination      Labs:                        10.8   9.46  )-----------( 296      ( 11 Jan 2025 16:40 )             33.6     01-11    139  |  105  |  11  ----------------------------<  92  4.4   |  22  |  0.59    Ca    9.7      11 Jan 2025 16:40    TPro  7.5  /  Alb  4.0  /  TBili  0.2  /  DBili  x   /  AST  16  /  ALT  16  /  AlkPhos  95  01-11      WBC Trend:  WBC Count: 9.46 (01-11-25 @ 16:40)      Auto Neutrophil #: 7.12 K/uL (01-11-25 @ 16:40)  Auto Neutrophil #: 5.26 K/uL (04-14-24 @ 12:40)  Auto Neutrophil #: 6.29 K/uL (01-20-24 @ 18:02)      Creatine Trend:  Creatinine: 0.59 (01-11)      Liver Biochemical Testing Trend:  Alanine Aminotransferase (ALT/SGPT): 16 (01-11)  Alanine Aminotransferase (ALT/SGPT): 16 (04-14)  Alanine Aminotransferase (ALT/SGPT): 20 (01-20)  Aspartate Aminotransferase (AST/SGOT): 16 (01-11-25 @ 16:40)  Aspartate Aminotransferase (AST/SGOT): 20 (04-14-24 @ 12:40)  Aspartate Aminotransferase (AST/SGOT): 17 (01-20-24 @ 18:02)  Bilirubin Total: 0.2 (01-11)  Bilirubin Total: 0.1 (04-14)  Bilirubin Total: <0.1 (01-20)  Auto Eosinophil %: 0.5 % (01-11-25 @ 16:40)    MICROBIOLOGY:  Culture - Abscess with Gram Stain (collected 11 Jan 2025 17:15)  Source: .Abscess    Culture - Urine (collected 14 Apr 2024 12:24)  Source: Clean Catch Clean Catch (Midstream)  Final Report:    <10,000 CFU/mL Normal Urogenital Christa    Lactate, Blood: 1.4 (01-12 @ 00:43)  Blood Gas Venous - Lactate: 1.6 (01-11 @ 16:54)  Lactate, Blood: 2.1 (01-11 @ 16:40)    A1C with Estimated Average Glucose Result: 5.8 % (01-11-25 @ 19:00)      RADIOLOGY:  none performed this admission    Patient is a 49y old  Female who presents with a chief complaint of abscesses    HPI:  49 year old woman pmh HTN, Kidney stones with hx of lithotripsy, anemia presenting with 4 abscesses for the past 6 days. They all started around the same time, one on the right thigh, one on the LLQ, one in the right arm pit, and right shin. The right thigh and LLQ are the largest and most painful and have been draining bloody fluid. SHe has never had an abscess or lump before ever. Upon work up patinet afebrile and had 1x episode of HR of 100. Labs showed WBc 9.46 and H/H 10.8/33.6, and lactic acid 2.1 to 1.3, HIV negative and A1c 5.8. Patient under went I&D of R axillary which drained 1-2 cc blood tinged purulence which was sent for culture which gram stain doesn't show any organisms though culture still pending. Patient states that the areas that are affected started approximately 2 weeks ago. She denies trauma to the area but does admit to waxing of the areas with strips from local salon. She waxed all of the areas that are affected but the areas that bother her the most are the RLE on the thigh and the L abdomen. She admits to chills but denies fevers. She also admits to diarrhea and her  having a recent GI bug.     REVIEW OF SYSTEMS  Constitutional: No fevers, positive chills, No weight loss, No fatigue   Skin: No rash, no phlebitis, positive skin wounds 	  Eyes: No discharge, No change in vision	  ENMT: No sore throat, No ulcers  Respiratory: No cough, no SOB  Cardiovascular:  No chest pain, No palpitations   Gastrointestinal: No pain, No nausea, No vomiting, positive diarrhea, No constipation	  Genitourinary: No dysuria, No frequency, No hesitancy, No flank pain  MSK: No Joint pain, No back pain, No edema  Neurological: No HA, no weakness, no seizures, no AMS     prior hospital charts reviewed [V]  primary team notes reviewed [V]  other consultant notes reviewed [V]    PAST MEDICAL & SURGICAL HISTORY:  HTN (hypertension)      Kidney stone      Panic attack      Calculus of kidney      Anemia      Menstrual headache      2019 novel coronavirus disease (COVID-19)      Seasonal allergies      History of lithotripsy          SOCIAL HISTORY:  Denied smoking/vaping/alcohol/recreational drug use currently, Born in  [place], last travel out of the country to  in 6/2023, no pets,  recently sick    FAMILY HISTORY:      Allergies  penicillin (Unknown)  aspirin (Anaphylaxis)  epinephrine (Anaphylaxis)  Mushrooms (Anaphylaxis)  IODIZED SODIUM (Anaphylaxis)        ANTIMICROBIALS:  ceFAZolin   IVPB 1000 every 8 hours  ceFAZolin   IVPB    doxycycline IVPB    doxycycline IVPB 100 every 12 hours      ANTIMICROBIALS (past 90 days):  MEDICATIONS  (STANDING):  ceFAZolin   IVPB   100 mL/Hr IV Intermittent (01-11-25 @ 17:24)    ceFAZolin   IVPB   100 mL/Hr IV Intermittent (01-12-25 @ 09:01)   100 mL/Hr IV Intermittent (01-12-25 @ 00:24)    doxycycline IVPB   100 mL/Hr IV Intermittent (01-11-25 @ 17:45)    doxycycline IVPB   100 mL/Hr IV Intermittent (01-12-25 @ 05:53)        OTHER MEDS:   MEDICATIONS  (STANDING):  acetaminophen     Tablet .. 650 every 6 hours PRN  amLODIPine   Tablet 5 daily      VITALS:  Vital Signs Last 24 Hrs  T(F): 98.5 (01-12-25 @ 07:34), Max: 99.1 (01-11-25 @ 19:55)    Vital Signs Last 24 Hrs  HR: 87 (01-12-25 @ 07:34) (73 - 100)  BP: 120/76 (01-12-25 @ 07:34) (120/76 - 176/96)  RR: 17 (01-12-25 @ 07:34)  SpO2: 99% (01-12-25 @ 07:34) (96% - 100%)  Wt(kg): --    EXAM:  General: Patient appears comfortable, no acute distress  HEENT: NCAT, PERRL, anicteric sclera, mucous membranes moist and intact  Neck: Supple, No lymphadenopathy  CV: +S1/S2, RRR, no M/R/G  Lungs: No respiratory distress, CTA b/l, no wheezing, rales or rhonchi  Abd:  BS4+, Soft, NTND, no guarding, small area of superficial firmness in the skin of the LLQ  : No suprapubic tenderness  Neuro: AAOx3. No focal deficits noted.   Ext: No cyanosis, no edema, well healing wound of the lateral distal R lower extremity, proximal medial aspect of the RLE with area of induration wiht purulent bloody drainage and pain wit palpation and surrounding erythema, L axilla with small 1cm area of firmness though no pain no erythema  Msk: freely moving upper and lower extremities  Skin: No rash, no phlebitis       Labs:                        10.8   9.46  )-----------( 296      ( 11 Jan 2025 16:40 )             33.6     01-11    139  |  105  |  11  ----------------------------<  92  4.4   |  22  |  0.59    Ca    9.7      11 Jan 2025 16:40    TPro  7.5  /  Alb  4.0  /  TBili  0.2  /  DBili  x   /  AST  16  /  ALT  16  /  AlkPhos  95  01-11      WBC Trend:  WBC Count: 9.46 (01-11-25 @ 16:40)      Auto Neutrophil #: 7.12 K/uL (01-11-25 @ 16:40)  Auto Neutrophil #: 5.26 K/uL (04-14-24 @ 12:40)  Auto Neutrophil #: 6.29 K/uL (01-20-24 @ 18:02)      Creatine Trend:  Creatinine: 0.59 (01-11)      Liver Biochemical Testing Trend:  Alanine Aminotransferase (ALT/SGPT): 16 (01-11)  Alanine Aminotransferase (ALT/SGPT): 16 (04-14)  Alanine Aminotransferase (ALT/SGPT): 20 (01-20)  Aspartate Aminotransferase (AST/SGOT): 16 (01-11-25 @ 16:40)  Aspartate Aminotransferase (AST/SGOT): 20 (04-14-24 @ 12:40)  Aspartate Aminotransferase (AST/SGOT): 17 (01-20-24 @ 18:02)  Bilirubin Total: 0.2 (01-11)  Bilirubin Total: 0.1 (04-14)  Bilirubin Total: <0.1 (01-20)  Auto Eosinophil %: 0.5 % (01-11-25 @ 16:40)    MICROBIOLOGY:  Culture - Abscess with Gram Stain (collected 11 Jan 2025 17:15)  Source: .Abscess    Culture - Urine (collected 14 Apr 2024 12:24)  Source: Clean Catch Clean Catch (Midstream)  Final Report:    <10,000 CFU/mL Normal Urogenital Christa    Lactate, Blood: 1.4 (01-12 @ 00:43)  Blood Gas Venous - Lactate: 1.6 (01-11 @ 16:54)  Lactate, Blood: 2.1 (01-11 @ 16:40)    A1C with Estimated Average Glucose Result: 5.8 % (01-11-25 @ 19:00)      RADIOLOGY:  none performed this admission

## 2025-01-12 NOTE — H&P ADULT - PROBLEM SELECTOR PLAN 4
DVT Ppx: _______  Diet: DASH/TLC Diet  Dispo: Active, on IV abx DVT Ppx: can hold off, IMPROVE score: 0   Diet: DASH/TLC Diet  Dispo: Active, on IV abx -On home norvasc 5mg    PLAN:  >c/w norvasc 5mg

## 2025-01-12 NOTE — H&P ADULT - ASSESSMENT
48 y/o F with PMHx of HTN p/w 2x weeks of 4 abscesses after waxing affected areas s/p I&D of R axilla - likely contact dermatitis with superimposed infection and abscesses vs hidradenitis suppurativa.  48 y/o F with PMHx of HTN p/w 2x weeks of 4 abscesses after waxing affected areas s/p I&D of R axilla - likely contact dermatitis with superimposed infection and abscesses +/- (?)hidradenitis suppurativa.  50 y/o F with PMHx of HTN p/w 2x weeks of 4 abscesses after waxing affected areas s/p I&D of R axilla - likely furuncle/boil vs contact dermatitis with superimposed MRSA bacterial infection vs hidradenitis suppurativa

## 2025-01-12 NOTE — H&P ADULT - HISTORY OF PRESENT ILLNESS
HPI: 50 y/o F with PMHx of HTN presenting with 4 abscesses for the past 6 days that started around the same time, one on the right thigh, one on the LLQ, one in the right arm pit, and right shin. The right thigh and LLQ are the largest and most painful and have been draining bloody fluid. She has never had an abscess or lump before ever. Patient states that the areas that are affected started approximately 2 weeks ago. She denies trauma to the area but does admit to waxing of the areas with strips from local salon. She waxed all of the areas that are affected but the areas that bother her the most are the RLE on the thigh and the L abdomen.   Den[place] recent travel, from [place] but hasn't been there since July. Endorses chills, but denies fevers, chest pain, sob, abd pain, n/v/d/c, dysuria.   Patient denies any injections to the sites, denies any shaving, but run along her groin region in her underarm the largest of which is on her right thigh which is already ulcerated and drained just indurated ultrasound performed with no fluid collection there but surrounding redness erythema to the medial thigh labs are within normal limits vital signs stable IV antibiotics were ordered consider CDU stay for several doses of IV antibiotics.    ER Course:  -VS stable on admission, afebrile, but 1x episode of HR to 100. Initial labs showed WBC 9.46, H/H 10.8/33.6, lactic acid 2.1 -> 1.6 -> 1.3. HIV negative, A1c 5.8%. MRSA PCR: +.  -Patient was started initially on cefazolin and doxycycline. Wounds were re-examined the next day with extension of right thigh erythema, though noticeably less erythematous and warm. Patient under went I&D of R axillary which drained 1-2 cc blood tinged purulence which was sent for culture, gram stain doesn't show any organisms though still pending organisms.  -Per ID recommendations, the patient was discontinued on doxycycline, started on vancomycin, and continued on cefazolin, s/p 1LNS   HPI: 48 y/o F with PMHx of HTN presenting with abscesses that have started approximately 2 weeks prior, one on the right thigh, one on the LLQ, one in the right arm pit, and right shin. 2 weeks ago, she began noticing nodules that started in her right axilla and right and left neck. Since then,The right thigh and LLQ are the largest and most painful and have been draining bloody fluid. She has never had an abscess or lump before ever. Patient states that the areas that are affected started approximately 2 weeks ago. She denies trauma to the area/injections/trauma/shaving, but does admit to waxing of the areas with strips from local salon. She waxed all of the areas that are affected but the areas that bother her the most are the RLE on the thigh and the L abdomen.   [place] recent travel, from [place] but hasn't been there since July. Endorses chills, but denies fevers, chest pain, sob, abd pain, n/v/d/c, dysuria.       ER Course:  -VS stable on admission, afebrile, but 1x episode of HR to 100. Initial labs showed WBC 9.46, H/H 10.8/33.6, lactic acid 2.1 -> 1.6 -> 1.3. HIV negative, A1c 5.8%. MRSA PCR: +.  -Patient was started initially on cefazolin and doxycycline. Wounds were re-examined the next day with extension of right thigh erythema, though noticeably less erythematous and warm. Patient under went I&D of R axillary which drained 1-2 cc blood tinged purulence which was sent for culture, gram stain doesn't show any organisms though still pending organisms.  -Per ID recommendations, the patient was discontinued on doxycycline, started on vancomycin, and continued on cefazolin, s/p 1LNS   HPI: 48 y/o F with PMHx of HTN presenting with abscesses that have started approximately 2 weeks prior, one on the right thigh, one on the LLQ, one in the right arm pit, and right shin. 2 weeks ago, she began noticing nodules that started in her right axilla and right and left neck. The right and left neck nodules have since disappeared, but the right axillary skin lesion has persisted. Patient is unsure of when in the last two weeks that her left abdominal skin wound has started. She then waxed the affected areas on Saturday, the week prior, at which point the right thigh lesion appeared on Monday. Since then, the right thigh and LLQ are the largest and most painful and have been draining bloody fluid, the right thigh lesion also been increasing in warmth, tenderness, and erythema. She has never had an abscess or lump before ever. She denies trauma to the area/injections/trauma/shaving, but does admit to waxing of the areas with strips from local salon. She waxed 3 months prior as well. She waxed all of the areas that are affected but the areas that bother her the most are the RLE on the thigh and the L abdomen. Pt states ambulating has been challenging due to pain from R thigh wound.  Denies recent travel, from the [place] but hasn't been there since July. Endorses chills, but denies fevers, chest pain, sob, abd pain, n/v/d/c, dysuria.       ER Course:  -VS stable on admission, afebrile, but 1x episode of HR to 100. Initial labs showed WBC 9.46, H/H 10.8/33.6, lactic acid 2.1 -> 1.6 -> 1.3. HIV negative, A1c 5.8%. MRSA PCR: +.  -Patient was started initially on cefazolin and doxycycline. Wounds were re-examined the next day with extension of right thigh erythema, though noticeably less erythematous and warm. Patient under went R axillary expression which drained 1-2 cc blood tinged purulence which was sent for culture, gram stain doesn't show any organisms though still pending organisms.  -Per ID recommendations, the patient was discontinued on doxycycline, started on vancomycin, and continued on cefazolin, s/p 1LNS  -Endorses diarrhea today, notices improvement in her erythema of R thigh skin lesion   HPI: 50 y/o F with PMHx of HTN presenting with abscesses that have started approximately 2 weeks prior, one on the right thigh, one on the LLQ, one in the right arm pit, and right shin. 2 weeks ago, she began noticing nodules that started in her right axilla and right and left neck. The right and left neck nodules have since disappeared, but the right axillary skin lesion has persisted. Patient is unsure of when in the last two weeks that her left abdominal skin lesion has started. She then waxed the right/left thighs and the suprapubic region (did not wax sides of the abdomen or axillary regions) on Saturday (1/4/25), at which point the right thigh lesion appeared on Monday (1/6/25). Since then, the right thigh and LLQ are the largest and most painful and have been draining bloody fluid, the right thigh lesion also been increasing in warmth, tenderness, and erythema. She has never had an abscess or lump before ever. She denies trauma to the area/injections/trauma/shaving, but does admit to waxing of the areas with strips from local salon. She waxed 3 months prior as well. She waxed all of the areas that are affected but the areas that bother her the most are the RLE on the thigh and the L abdomen. Pt states ambulating has been challenging due to pain from R thigh wound.  Den[place] recent travel, from [place] but hasn't been there since July, denies pets at home. Endorses chills, but denies fevers, chest pain, sob, abd pain, n/v/d/c, dysuria.       ER Course:  -VS stable on admission, afebrile, but 1x episode of HR to 100. Initial labs showed WBC 9.46, H/H 10.8/33.6, lactic acid 2.1 -> 1.6 -> 1.3. HIV negative, A1c 5.8%. MRSA PCR: +.  -Patient was started initially on cefazolin and doxycycline. Wounds were re-examined the next day with extension of right thigh erythema, though noticeably less erythematous and warm. Patient under went R axillary expression which drained 1-2 cc blood tinged purulence which was sent for culture, gram stain doesn't show any organisms though still pending organisms.  -Per ID recommendations, the patient was discontinued on doxycycline, started on vancomycin, and continued on cefazolin, s/p 1LNS  -Endorses diarrhea today, notices improvement in her erythema of R thigh skin lesion

## 2025-01-12 NOTE — H&P ADULT - ATTENDING COMMENTS
49F w/ PMHx HTN presenting for multiple skin abscesses, largest in R thigh with bloody drainage. She states that she waxes herself with reusable strips. Currently 4 abscesses noted, R posterior axilla, R thigh, LLQ of abdomen, and R lateral calf.     Chills at home, but no fever. In the ED, afebrile but tachycardic, stated she had some diarrhea after abx.    #Furunculosis  -seen by ID, c/w ancef and vanco given +MRSA swab, follow up blood and thigh abscess cultures  -s/p I&D by gen surg of R thigh abscess  -c/w gentle IVF for now given tachycardia  -if persistent diarrhea, obtain GI PCR given  recently ill with diarrheal illness  -low suspicion for underlying immunodeficiency given lack of hx of chronic infections    rest as above, d/w HS 1

## 2025-01-12 NOTE — ED CDU PROVIDER SUBSEQUENT DAY NOTE - HISTORY
Patient reassessed, resting comfortably without any new or evolving complaints.  VSS.  Area of erythema of right thigh cellulitis has extended slightly past the previously marked aspect of the distal anterior border, other areas remain within the previously marked border, central wound with bloody ooze which was covered with bandage.  No fevers or chills.  Will continue IV antibiotics and close observation.

## 2025-01-12 NOTE — H&P ADULT - PROBLEM SELECTOR PLAN 2
-s/p I&D of R axillary     PLAN:    >f/u general surgery consult for possible further drainage  >f/u Wound cx -s/p expression of R axillary, wound cx sent   -Ultrasound: abscess collection in the right medial thigh measures 4.1 x 1.5 x 4.0 cm.     PLAN:  >See plan above   >f/u general surgery consult for possible further drainage  >f/u Wound Cx: NGTD, negative gram stain -s/p expression of R axillary, wound cx sent   -Ultrasound: abscess collection in the right medial thigh measures 4.1 x 1.5 x 4.0 cm.     PLAN:  >See plan above   >f/u expressed Wound Cx: NGTD, negative gram stain  >f/u I&D Wound Cx

## 2025-01-12 NOTE — H&P ADULT - NSHPLABSRESULTS_GEN_ALL_CORE
LABS:                          10.8   9.46  )-----------( 296      ( 11 Jan 2025 16:40 )             33.6     01-11    139  |  105  |  11  ----------------------------<  92  4.4   |  22  |  0.59    Ca    9.7      11 Jan 2025 16:40    TPro  7.5  /  Alb  4.0  /  TBili  0.2  /  DBili  x   /  AST  16  /  ALT  16  /  AlkPhos  95  01-11    US Extremity: FINDINGS:  -A collection in the right medial thigh measures 4.1 x 1.5 x 4.0 cm. There   is associated hyperemia and edema. There is increased echogenicity   involving the adjacent fat planes. There is no internal vascularity.  -IMPRESSION:  Findings consistent with abscess in the area of interest measuring up to   4.1 cm

## 2025-01-12 NOTE — H&P ADULT - TIME BILLING
time spent reviewing prior charts, meds, discussing plan with patient, = 81 minutes. Time spent does not include time spent teaching

## 2025-01-12 NOTE — ED CDU PROVIDER SUBSEQUENT DAY NOTE - NS ED MD CDU HISTORY DATE TIME DT
Balloon inserted to left anterior descending and proximal left anterior descending. 12-Jan-2025 02:35

## 2025-01-12 NOTE — H&P ADULT - NSHPPHYSICALEXAM_GEN_ALL_CORE
General: Patient appears comfortable, no acute distress  HEENT: NCAT, PERRL, anicteric sclera, mucous membranes moist and intact  Neck: Supple, No lymphadenopathy  CV: +S1/S2, RRR, no M/R/G  Lungs: No respiratory distress, CTA b/l, no wheezing, rales or rhonchi  Abd:  BS4+, Soft, NTND, no guarding, small area of superficial firmness in the skin of the LLQ  : No suprapubic tenderness  Neuro: AAOx3. No focal deficits noted.   Ext: No cyanosis, no edema, R medial proximal thigh, 16cm+ in diameter, Approx 6-7cm diameter region of induration, excoriation with trace blood, no purulent drainage, no induration. Smaller region of erythema approx 8cm in diameter LLQ with small central induration, no fluctuance, eschar vs healing excoriation. R axilla with approx 1cm region of induration with central fluctuance, drained approx 1-2cc of purulent- blood-tinged material expressed, small region of surrounding erythema. Additional punctate region of healing excoriation no right loweral lower leg, proximal 1/3 without drainage or appreciable surrounding erythema. No crepitus.  Msk: freely moving upper and lower extremities  Skin: No rash, no phlebitis General: Patient appears comfortable, no acute distress  HEENT: NCAT, PERRL, anicteric sclera, mucous membranes moist and intact  Neck: Supple, No lymphadenopathy. Hirsutism present.   CV: +S1/S2, RRR, no M/R/G  Lungs: No respiratory distress, CTA b/l, no wheezing, rales or rhonchi  Abd:  BS4+, Soft, NTND, no guarding, small area of superficial firmness in the skin of the LLQ  : No suprapubic tenderness  Neuro: AAOx3. No focal deficits noted.   Ext: No cyanosis, no edema, R medial proximal thigh, 16cm+ in diameter, Approx 6-7cm diameter region of induration, excoriation with trace blood, no purulent drainage, no induration. Smaller region of erythema approx 8cm in diameter LLQ with small central induration, no fluctuance, eschar vs healing excoriation. R axilla with approx 1cm region of induration with central fluctuance, drained approx 1-2cc of purulent- blood-tinged material expressed, small region of surrounding erythema. Additional punctate region of healing excoriation no right loweral lower leg, proximal 1/3 without drainage or appreciable surrounding erythema. No crepitus.  Msk: freely moving upper and lower extremities  Skin: No rash, no phlebitis

## 2025-01-12 NOTE — PROCEDURE NOTE - SUPERVISORY STATEMENT
Bedside I&D done with 10cc pus drained.  Sample sent for culture.  Patient tolerating procedure well.   Wound packed with gauze

## 2025-01-12 NOTE — CONSULT NOTE ADULT - SUBJECTIVE AND OBJECTIVE BOX
General Surgery Consult  Consulting surgical team: [facility]   Consulting attending: Bank   Patient seen and examined: 25 @ 16:11    HPI:  HPI: 50 y/o F with PMHx of HTN presents with areas concerning for abscesses that have started approximately 2 weeks prior, one on the right thigh, one on the LLQ, one in the right arm pit, and right shin. 2 weeks ago, she began noticing nodules that started in her right axilla and right and left neck. The right and left neck nodules have since disappeared, but the right axillary skin lesion has persisted. Patient states she waxed her R thigh on 25 (but did not wax her axilla) and noticed a lesion that appeared on 25. She states that since then the area has been increasing in swelling and becoming painful so she presented to the ED. She denies any other recent trauma, injuries, or injections to the area. Patient admitted to the medicine service for antibiotics. General surgery consulted for possible I&D of R thigh.     Patient seen and examined at bedside. Patient denies nausea, vomiting, fever, chills, numbness or tingling in RLE.     PAST MEDICAL HISTORY:  No pertinent past medical history    HTN (hypertension)    Kidney stone    Panic attack    Calculus of kidney    Anemia    Menstrual headache    2019 novel coronavirus disease (COVID-19)    Seasonal allergies    Heavy menstrual bleeding        PAST SURGICAL HISTORY:  No significant past surgical history    History of lithotripsy    H/O:         ALLERGIES:  penicillin (Unknown)  aspirin (Anaphylaxis)  epinephrine (Anaphylaxis)  Mushrooms (Anaphylaxis)  IODIZED SODIUM (Anaphylaxis)      MEDICATIONS:  acetaminophen     Tablet .. 650 milliGRAM(s) Oral every 6 hours PRN  amLODIPine   Tablet 5 milliGRAM(s) Oral daily  ceFAZolin   IVPB 1000 milliGRAM(s) IV Intermittent every 8 hours  ceFAZolin   IVPB      melatonin 3 milliGRAM(s) Oral at bedtime PRN  mupirocin 2% Nasal 1 Application(s) Both Nostrils two times a day  vancomycin  IVPB 1000 milliGRAM(s) IV Intermittent every 12 hours      VITALS & I/Os:  Vital Signs Last 24 Hrs  T(C): 37.2 (2025 10:48), Max: 37.3 (2025 19:55)  T(F): 99 (2025 10:48), Max: 99.1 (2025 19:55)  HR: 95 (2025 10:48) (73 - 100)  BP: 118/82 (2025 10:48) (118/82 - 176/96)  BP(mean): --  RR: 17 (2025 10:48) (17 - 18)  SpO2: 99% (2025 10:48) (96% - 99%)    Parameters below as of 2025 05:59  Patient On (Oxygen Delivery Method): room air        I&O's Summary      PHYSICAL EXAM:  General: No acute distress  Respiratory: Nonlabored respirations   Cardiovascular: pulse present  Abdominal: Softly distended, nontender, 0.5x0.5cm scab noted in LLQ without induration or fluctuance  Extremities: Appears warm and well perfused, R thigh 2x3cm scabbed area with surrounding induration and erythema, superior aspect of lesion with purulent drainage, tender to palpation    LABS:                        10.8   9.46  )-----------( 296      ( 2025 16:40 )             33.6         139  |  105  |  11  ----------------------------<  92  4.4   |  22  |  0.59    Ca    9.7      2025 16:40    TPro  7.5  /  Alb  4.0  /  TBili  0.2  /  DBili  x   /  AST  16  /  ALT  16  /  AlkPhos  95  01-11    Lactate: Lactate, Blood: 1.4 mmol/L ( @ 00:43)  Lactate, Blood: 2.1 mmol/L ( @ 16:40)    @ 16:54  1.6              Urinalysis Basic - ( 2025 16:40 )    Color: x / Appearance: x / SG: x / pH: x  Gluc: 92 mg/dL / Ketone: x  / Bili: x / Urobili: x   Blood: x / Protein: x / Nitrite: x   Leuk Esterase: x / RBC: x / WBC x   Sq Epi: x / Non Sq Epi: x / Bacteria: x        IMAGING:  < from: US Extremity Nonvasc Limited, Right (25 @ 13:34) >  FINDINGS:    A collection in the right medial thigh measures 4.1 x 1.5 x 4.0 cm. There   is associated hyperemia and edema. There is increased echogenicity   involving the adjacent fat planes. There is no internal vascularity.    IMPRESSION:    Findings consistent with abscess in the area of interest measuring up to   4.1 cm    < end of copied text >

## 2025-01-12 NOTE — CONSULT NOTE ADULT - ATTENDING COMMENTS
Patient seen and examined in ED  Awake, alert, non-toxic appearing  afebrile   vitals stable  3cm x 3cm right upper thigh wound with central necrosis and surrounding erythema  For bedside I&D (see procedure note)  Wound packed - will change packing tomorrow
49 year old woman pmh HTN, Kidney stones with hx of lithotripsy, anemia presenting with 4 abscesses for the past 6 days. They all started around the same time, one on the right thigh, one on the LLQ, one in the right arm pit, and right shin. The right thigh and LLQ are the largest and most painful and have been draining bloody fluid. SHe has never had an abscess or lump before ever. Upon work up patient afebrile and had 1x episode of HR of 100. Labs showed WBc 9.46 and H/H 10.8/33.6, and lactic acid 2.1 to 1.3, HIV negative and A1c 5.8. Patient under went I&D of R axillary which drained 1-2 cc blood tinged purulence which was sent for culture which gram stain doesn't show any organisms though culture still pending. ID consulted for further management.     *Multiple acute cutaneous abscess- with no h/o DM, no IVDU. No similar complaints in the past to suggest immunodeficiency. [place] MRSA infection  *Anemia  *Lactic acidosis -- improved   *Pre-DM , HBA1c: 5.8%    Antimicrobials:  doxycycline 1/11 - current  cefazolin 1/11 - current    MICRO:   1/12 BCX: IP  1/11 MRSA nares PCR: positive   1/11 Abscess cx; IP   1/11 HIV: negative     Recommendations:   - continue cefazolin 1 gm Q8H for now   - d/c doxycycline  - start vancomycin 1 gm Q12H for MRSA coverage. monitor vancomycin trough and creatinine to avoid nephrotoxicity and ensure efficacy.   - blood cultures from 1/12- pending (although obtained after multiple doses of antibiotics)  - surgery consultation as the R proximal lower extremity which likely needs I&D, please obtain cultures from I&D procedure  - follow abscess cultures     Discussed with the primary team    Eve Christensen MD  Attending Physician, Division of Infectious Diseases  Department of Medicine   [facility]    Contact on TEAMS messaging from 9am - 5pm  Office: [phone] (after 5 PM or weekend)

## 2025-01-12 NOTE — H&P ADULT - PROBLEM SELECTOR PLAN 5
DVT Ppx: can hold off, IMPROVE score: 0   Diet: DASH/TLC Diet  Dispo: Active, on IV abx pending wound cx speciation

## 2025-01-12 NOTE — H&P ADULT - PROBLEM SELECTOR PLAN 3
-On home norvasc 5mg    PLAN:  >c/w norvasc 5mg -1x episode of diarrhea on 1/11/25  -s/p initiation of abx  - reporting diarrhea as well    PLAN:  >c/w monitoring stools  >GI PCR if loose-stools continue

## 2025-01-12 NOTE — CHART NOTE - NSCHARTNOTEFT_GEN_A_CORE
General Surgery Post-Procedure Check    s/p bedside R thigh abscess I&D    Pt seen and examined without acute complaints in ED. Pain is controlled. She reports she ambulated earlier with less pain. Has intermittent chills. Denies N/V, numbness, tingling to RLE.     Vital Signs Last 24 Hrs  T(C): 36.8 (12 Jan 2025 16:35), Max: 37.2 (12 Jan 2025 10:48)  T(F): 98.3 (12 Jan 2025 16:35), Max: 99 (12 Jan 2025 10:48)  HR: 109 (12 Jan 2025 16:35) (73 - 109)  BP: 123/81 (12 Jan 2025 16:35) (118/82 - 150/81)  BP(mean): --  RR: 17 (12 Jan 2025 16:35) (17 - 18)  SpO2: 99% (12 Jan 2025 16:35) (99% - 99%)    Parameters below as of 12 Jan 2025 05:59  Patient On (Oxygen Delivery Method): room air        I&O's Summary      General: No acute distress  Respiratory: Nonlabored respirations   Cardiovascular: pulse present  Abdominal: Softly distended, nontender, 0.5x0.5cm scab noted in LLQ without induration or fluctuance  Extremities: Appears warm and well perfused, R thigh outer dressed in gauze c/d/i. Remainder wound packed w/ gauze w/ some seropurulent drainage noted. No increased erythema around surrounding circled area of wound site. Sensation and motor intact of RLE.       Assessment: 49 F PMH HTN, Kidney stones with hx of lithotripsy, anemia, presents with areas c/f abscesses. s/p R thigh abscess I&D by general surgery. Patient reports improved RLE pain. Site remains c/d/i w/o increased erythema, fluctuance, or induration.     Plan:  -pain control PRN  -recommend ID recs  -f/u wound culture  -remainder care per primary team     Trauma/ACS  [phone]

## 2025-01-12 NOTE — H&P ADULT - NSICDXPASTMEDICALHX_GEN_ALL_CORE_FT
PAST MEDICAL HISTORY:  2019 novel coronavirus disease (COVID-19)     Anemia     Calculus of kidney     Heavy menstrual bleeding     HTN (hypertension)     Kidney stone     Menstrual headache     Panic attack     Seasonal allergies

## 2025-01-13 LAB
ANION GAP SERPL CALC-SCNC: 14 MMOL/L — SIGNIFICANT CHANGE UP (ref 5–17)
BUN SERPL-MCNC: 12 MG/DL — SIGNIFICANT CHANGE UP (ref 7–23)
CALCIUM SERPL-MCNC: 8.8 MG/DL — SIGNIFICANT CHANGE UP (ref 8.4–10.5)
CHLORIDE SERPL-SCNC: 102 MMOL/L — SIGNIFICANT CHANGE UP (ref 96–108)
CO2 SERPL-SCNC: 22 MMOL/L — SIGNIFICANT CHANGE UP (ref 22–31)
CREAT SERPL-MCNC: 0.65 MG/DL — SIGNIFICANT CHANGE UP (ref 0.5–1.3)
EGFR: 107 ML/MIN/1.73M2 — SIGNIFICANT CHANGE UP
GLUCOSE SERPL-MCNC: 118 MG/DL — HIGH (ref 70–99)
GRAM STN FLD: ABNORMAL
HCT VFR BLD CALC: 32.6 % — LOW (ref 34.5–45)
HGB BLD-MCNC: 10.1 G/DL — LOW (ref 11.5–15.5)
MCHC RBC-ENTMCNC: 28.1 PG — SIGNIFICANT CHANGE UP (ref 27–34)
MCHC RBC-ENTMCNC: 31 G/DL — LOW (ref 32–36)
MCV RBC AUTO: 90.8 FL — SIGNIFICANT CHANGE UP (ref 80–100)
NRBC # BLD: 0 /100 WBCS — SIGNIFICANT CHANGE UP (ref 0–0)
PLATELET # BLD AUTO: 338 K/UL — SIGNIFICANT CHANGE UP (ref 150–400)
POTASSIUM SERPL-MCNC: 4.1 MMOL/L — SIGNIFICANT CHANGE UP (ref 3.5–5.3)
POTASSIUM SERPL-SCNC: 4.1 MMOL/L — SIGNIFICANT CHANGE UP (ref 3.5–5.3)
RBC # BLD: 3.59 M/UL — LOW (ref 3.8–5.2)
RBC # FLD: 12.8 % — SIGNIFICANT CHANGE UP (ref 10.3–14.5)
SODIUM SERPL-SCNC: 138 MMOL/L — SIGNIFICANT CHANGE UP (ref 135–145)
WBC # BLD: 4.54 K/UL — SIGNIFICANT CHANGE UP (ref 3.8–10.5)
WBC # FLD AUTO: 4.54 K/UL — SIGNIFICANT CHANGE UP (ref 3.8–10.5)

## 2025-01-13 PROCEDURE — G0545: CPT

## 2025-01-13 PROCEDURE — 99232 SBSQ HOSP IP/OBS MODERATE 35: CPT

## 2025-01-13 PROCEDURE — 99232 SBSQ HOSP IP/OBS MODERATE 35: CPT | Mod: GC

## 2025-01-13 RX ORDER — INFLUENZA A VIRUS A/WISCONSIN/588/2019 (H1N1) RECOMBINANT HEMAGGLUTININ ANTIGEN, INFLUENZA A VIRUS A/DARWIN/6/2021 (H3N2) RECOMBINANT HEMAGGLUTININ ANTIGEN, INFLUENZA B VIRUS B/AUSTRIA/1359417/2021 RECOMBINANT HEMAGGLUTININ ANTIGEN, AND INFLUENZA B VIRUS B/PHUKET/3073/2013 RECOMBINANT HEMAGGLUTININ ANTIGEN 45; 45; 45; 45 UG/.5ML; UG/.5ML; UG/.5ML; UG/.5ML
0.5 INJECTION INTRAMUSCULAR ONCE
Refills: 0 | Status: DISCONTINUED | OUTPATIENT
Start: 2025-01-13 | End: 2025-01-15

## 2025-01-13 RX ADMIN — Medication 100 MILLIGRAM(S): at 00:17

## 2025-01-13 RX ADMIN — Medication 1 APPLICATION(S): at 06:16

## 2025-01-13 RX ADMIN — Medication 100 MILLIGRAM(S): at 08:49

## 2025-01-13 RX ADMIN — VANCOMYCIN HYDROCHLORIDE 250 MILLIGRAM(S): 5 INJECTION, POWDER, LYOPHILIZED, FOR SOLUTION INTRAVENOUS at 06:15

## 2025-01-13 RX ADMIN — VANCOMYCIN HYDROCHLORIDE 250 MILLIGRAM(S): 5 INJECTION, POWDER, LYOPHILIZED, FOR SOLUTION INTRAVENOUS at 17:08

## 2025-01-13 RX ADMIN — Medication 1 APPLICATION(S): at 17:10

## 2025-01-13 RX ADMIN — ACETAMINOPHEN 650 MILLIGRAM(S): 80 SOLUTION/ DROPS ORAL at 17:09

## 2025-01-13 NOTE — PROGRESS NOTE ADULT - ASSESSMENT
50 y/o F with PMHx of HTN p/w 2x weeks of 4 abscesses after waxing affected areas s/p I&D of R axilla - likely furuncle/boil vs contact dermatitis with superimposed MRSA bacterial infection vs hidradenitis suppurativa

## 2025-01-13 NOTE — PROGRESS NOTE ADULT - ASSESSMENT
Assessment: 49yr F PMH HTN, Kidney stones with hx of lithotripsy, anemia, presents with areas c/f abscesses. Patient is now s/p bedside I&D of the R thigh yesterday.    Plan:  - Daily dressing change with 1/4" packing in the I&D site of the R thigh, guaze, and paper tape  - f/u wound culture  - Appreciate I&D recommendations  - Will sign off at this time, please call back with further questions or concerns    Trauma/ACS  [phone]  Assessment: 49yr F PMH HTN, Kidney stones with hx of lithotripsy, anemia, presents with areas c/f abscesses. Patient is now s/p bedside I&D of the R thigh yesterday.    Plan:  - Daily dressing change with 1/4" packing in the I&D site of the R thigh, guaze, and paper tape  - f/u wound culture  - Appreciate ID recommendations  - Please have patient follow-up with general surgery clinic for wound check after discharge   - Will sign off at this time, please call back with further questions or concerns    Trauma/ACS  [phone]

## 2025-01-13 NOTE — PROGRESS NOTE ADULT - ASSESSMENT
49 year old woman pmh HTN, Kidney stones with hx of lithotripsy, anemia presenting with 4 abscesses for the past 6 days. They all started around the same time, one on the right thigh, one on the LLQ, one in the right arm pit, and right shin. The right thigh and LLQ are the largest and most painful and have been draining bloody fluid. SHe has never had an abscess or lump before ever. Upon work up patient afebrile and had 1x episode of HR of 100. Labs showed WBc 9.46 and H/H 10.8/33.6, and lactic acid 2.1 to 1.3, HIV negative and A1c 5.8. Patient under went I&D of R axillary which drained 1-2 cc blood tinged purulence which was sent for culture which gram stain doesn't show any organisms though culture still pending. ID consulted for further management.     *Multiple acute cutaneous abscess- with no h/o DM, no IVDU. No similar complaints in the past to suggest immunodeficiency. [place] MRSA infection  # Right thigh abscess- S/P I&D on 1/12 by surgery  *Anemia  *Lactic acidosis -- improved   *Pre-DM , HBA1c: 5.8%    Antimicrobials:  doxycycline 1/11 - current  cefazolin 1/11 - current  Vancomycin 1/12-- current     MICRO:   1/12 BCX: IP  1/11 MRSA nares PCR: positive   1/11 Abscess cx; Few staph aureus   1/11 HIV: negative     Recommendations:   - Can stop cefazolin   - continue vancomycin 1 gm Q12H for MRSA coverage. monitor vancomycin trough and creatinine to avoid nephrotoxicity and ensure efficacy.   - blood cultures from 1/12- pending (although obtained after multiple doses of antibiotics)  - Follow wound cultures until completion to further tailor antibiotics.       Discussed with the primary team    Eve Christensen MD  Attending Physician, Division of Infectious Diseases  Department of Medicine   [facility]    Contact on TEAMS messaging from 9am - 5pm  Office: [phone] (after 5 PM or weekend).

## 2025-01-13 NOTE — ED ADULT NURSE REASSESSMENT NOTE - NSFALLUNIVINTERV_ED_ALL_ED
Bed/Stretcher in lowest position, wheels locked, appropriate side rails in place/Call bell, personal items and telephone in reach/Instruct patient to call for assistance before getting out of bed/chair/stretcher/Non-slip footwear applied when patient is off stretcher/[place] to call system/Physically safe environment - no spills, clutter or unnecessary equipment/Purposeful proactive rounding/Room/bathroom lighting operational, light cord in reach
Bed/Stretcher in lowest position, wheels locked, appropriate side rails in place/Call bell, personal items and telephone in reach/Instruct patient to call for assistance before getting out of bed/chair/stretcher/Non-slip footwear applied when patient is off stretcher/[place] to call system/Physically safe environment - no spills, clutter or unnecessary equipment/Purposeful proactive rounding/Room/bathroom lighting operational, light cord in reach

## 2025-01-13 NOTE — HISTORY OF PRESENT ILLNESS
[FreeTextEntry1] :  GALINDO ROMERO ,50 YO,  Presents for establish care  Complains about having her period every two weeks Anxiety always but increased, new onset insomnia, hot flashes, uncomfortable Feeling like there is heat everywhere  Patient reports malodourous vaginal discharge and suffering from different body odor Patient previously scheduled for hysteroscopic polypectomy after scan showed thickened ET 2.28cm with echogenic area. Patient presented to surgery and declined surgery the same day due to fear of intubation. Patient reports it is very hard for her to wake up from anesthesia. PMHX: HTN PSHX: BTL OBHX:  3 x  GYNHX: Uterine fibroids  Not smoker LMP: 23          Irregular Menses Medication Tranexamic acid 650MG Sexually active. Not using any contraceptives Last pap was in 2023 Mammo 2yrs ago No family History of breast cancer.   [id number] used by MA

## 2025-01-13 NOTE — HISTORY OF PRESENT ILLNESS
[FreeTextEntry1] :  GALINDO ROMERO ,48 YO,  Presents for establish care  Complains about having her period every two weeks Anxiety always but increased, new onset insomnia, hot flashes, uncomfortable Feeling like there is heat everywhere  Patient reports malodourous vaginal discharge and suffering from different body odor Patient previously scheduled for hysteroscopic polypectomy after scan showed thickened ET 2.28cm with echogenic area. Patient presented to surgery and declined surgery the same day due to fear of intubation. Patient reports it is very hard for her to wake up from anesthesia. PMHX: HTN PSHX: BTL OBHX:  3 x  GYNHX: Uterine fibroids  Not smoker LMP: 23          Irregular Menses Medication Tranexamic acid 650MG Sexually active. Not using any contraceptives Last pap was in 2023 Mammo 2yrs ago No family History of breast cancer.   [id number] used by MA

## 2025-01-13 NOTE — PROGRESS NOTE ADULT - ATTENDING COMMENTS
49F w/ PMHx HTN presenting for multiple skin abscesses, largest in R thigh with bloody drainage. She states that she waxes herself with reusable strips. Currently 4 abscesses noted, R posterior axilla, R thigh, LLQ of abdomen, and R lateral calf.     #Furunculosis  -seen by ID, c/w vanco given +MRSA swab, blood cultures show no growth and abscess culture is growing Staph aureus  -s/p I&D by gen surg of R thigh abscess, recs appreciated    DC home 1/14.

## 2025-01-13 NOTE — PLAN
[FreeTextEntry1] : AUB-P vs AUB-L  Patient presents for surgery consultation. Her condition and treatment options including 1) expectant management 2) surgical management was discussed. Through shared decision making, we decided to proceed with hysteroscopic polypectomy with Avcruz. Procedure was explained to patient. Risks, benefits and expected outcome of the procedure was discussed with the patient. Patient had opportunity to ask questions and all of her questions were answered to her satisfaction. Patient verbalized understanding of the above discussion. Presurgical testing instructions were provided. Booking sheet was submitted for planning.

## 2025-01-13 NOTE — ED ADULT NURSE REASSESSMENT NOTE - NS ED NURSE REASSESS COMMENT FT1
07.00 Received the Pt from  TANESHA Matthew Pt is Observed for multiple abscess Received the Pt A&OX 4  Pt obeys commands Holli N/V/D fever chills cp SOB   Comfort care & safety measures continued  IV site looks clean & dry no signs of infiltration noted pt denies  pain IV site .Pt is oriented to the unit Plan of care explained .  Pt is advised to call for help  call bell with in the reach pt verbalized the understanding .  pending CDU  MD frank . GCS 15/15 A&OX 4 PERRLA  size 3 Strong upper & lower extremities steady gait  Pt is ambulatory & independent   No facial droop  No Hand Leg drop denies numbness tingling  Pt has Rt thigh abscess with redness swelling & serous drainage LL abdominal abscess is mildly swollen with mild redness   RT axilla & RLE  abscess  are in healing process  Plan of care ongoing
Plan of care was discussed. Pt A&O x 4. Independent. Pt in CDU awaits for admission bed. Patient is admitted under medicine for continuation of IV antibiotics, IV hydration and Surgery following after I&D of right thigh abscess. Packing remained in place. Dressing is intact. V/S stable, pt afebrile, IV in place, patent and free of signs of infiltration. Pt resting in bed. Safety & comfort measures maintained. Call bell in reach. Care continues.
Pt received from TANESHA Marshall. Pt oriented to CDU & plan of care was discussed. Pt A&O x 4. Pt in CDU for IV antibiotics and vital signs q4h.  V/S stable, pt afebrile,  IV in place, patent and free of signs of infiltration. Pt resting in bed. Safety & comfort measures maintained. Call bell in reach. Care continues.
07.00 Received the Pt from  TANESHA Matthew Pt is Observed for Multiple abscess worst in the Rt thigh I&D done by Surgical team yesterday  Pt is admitted awaiting fo the bed  Received the Pt A&OX 4  Pt obeys commands Holli N/V/D fever chills cp SOB   Comfort care & safety measures continued  IV site looks clean & dry no signs of infiltration noted pt denies  pain IV site .Pt is oriented to the unit Plan of care explained .  Pt is advised to call for help  call bell with in the reach pt verbalized the understanding .  pending CDU  MD frank . GCS 15/15 A&OX 4 PERRLA  size 3 Strong upper & lower extremities steady gait  Pt is ambulatory & independent   No facial droop  No Hand Leg drop denies numbness tingling  Pt states she feels better comparing to yesterday Rt thigh abscess redness drainage & swelling  is decreasing  abscess in the axilla & RT below knee is dried up  LLabdomen abscess is in the healing process &redness is not spreading Plan of care ongoing

## 2025-01-14 ENCOUNTER — TRANSCRIPTION ENCOUNTER (OUTPATIENT)
Age: 50
End: 2025-01-14

## 2025-01-14 LAB
-  CLINDAMYCIN: SIGNIFICANT CHANGE UP
-  CLINDAMYCIN: SIGNIFICANT CHANGE UP
-  DAPTOMYCIN: SIGNIFICANT CHANGE UP
-  DAPTOMYCIN: SIGNIFICANT CHANGE UP
-  ERYTHROMYCIN: SIGNIFICANT CHANGE UP
-  ERYTHROMYCIN: SIGNIFICANT CHANGE UP
-  GENTAMICIN: SIGNIFICANT CHANGE UP
-  GENTAMICIN: SIGNIFICANT CHANGE UP
-  LINEZOLID: SIGNIFICANT CHANGE UP
-  LINEZOLID: SIGNIFICANT CHANGE UP
-  OXACILLIN: SIGNIFICANT CHANGE UP
-  OXACILLIN: SIGNIFICANT CHANGE UP
-  PENICILLIN: SIGNIFICANT CHANGE UP
-  PENICILLIN: SIGNIFICANT CHANGE UP
-  RIFAMPIN: SIGNIFICANT CHANGE UP
-  RIFAMPIN: SIGNIFICANT CHANGE UP
-  TETRACYCLINE: SIGNIFICANT CHANGE UP
-  TETRACYCLINE: SIGNIFICANT CHANGE UP
-  TRIMETHOPRIM/SULFAMETHOXAZOLE: SIGNIFICANT CHANGE UP
-  TRIMETHOPRIM/SULFAMETHOXAZOLE: SIGNIFICANT CHANGE UP
-  VANCOMYCIN: SIGNIFICANT CHANGE UP
-  VANCOMYCIN: SIGNIFICANT CHANGE UP
ANION GAP SERPL CALC-SCNC: 14 MMOL/L — SIGNIFICANT CHANGE UP (ref 5–17)
BUN SERPL-MCNC: 10 MG/DL — SIGNIFICANT CHANGE UP (ref 7–23)
CALCIUM SERPL-MCNC: 8.9 MG/DL — SIGNIFICANT CHANGE UP (ref 8.4–10.5)
CHLORIDE SERPL-SCNC: 106 MMOL/L — SIGNIFICANT CHANGE UP (ref 96–108)
CO2 SERPL-SCNC: 21 MMOL/L — LOW (ref 22–31)
CREAT SERPL-MCNC: 0.66 MG/DL — SIGNIFICANT CHANGE UP (ref 0.5–1.3)
EGFR: 106 ML/MIN/1.73M2 — SIGNIFICANT CHANGE UP
GLUCOSE SERPL-MCNC: 89 MG/DL — SIGNIFICANT CHANGE UP (ref 70–99)
HCT VFR BLD CALC: 31.7 % — LOW (ref 34.5–45)
HGB BLD-MCNC: 9.8 G/DL — LOW (ref 11.5–15.5)
MCHC RBC-ENTMCNC: 28.5 PG — SIGNIFICANT CHANGE UP (ref 27–34)
MCHC RBC-ENTMCNC: 30.9 G/DL — LOW (ref 32–36)
MCV RBC AUTO: 92.2 FL — SIGNIFICANT CHANGE UP (ref 80–100)
METHOD TYPE: SIGNIFICANT CHANGE UP
METHOD TYPE: SIGNIFICANT CHANGE UP
NRBC # BLD: 0 /100 WBCS — SIGNIFICANT CHANGE UP (ref 0–0)
PLATELET # BLD AUTO: 340 K/UL — SIGNIFICANT CHANGE UP (ref 150–400)
POTASSIUM SERPL-MCNC: 3.6 MMOL/L — SIGNIFICANT CHANGE UP (ref 3.5–5.3)
POTASSIUM SERPL-SCNC: 3.6 MMOL/L — SIGNIFICANT CHANGE UP (ref 3.5–5.3)
RBC # BLD: 3.44 M/UL — LOW (ref 3.8–5.2)
RBC # FLD: 12.8 % — SIGNIFICANT CHANGE UP (ref 10.3–14.5)
SODIUM SERPL-SCNC: 141 MMOL/L — SIGNIFICANT CHANGE UP (ref 135–145)
VANCOMYCIN TROUGH SERPL-MCNC: 4.9 UG/ML — LOW (ref 10–20)
WBC # BLD: 5.31 K/UL — SIGNIFICANT CHANGE UP (ref 3.8–10.5)
WBC # FLD AUTO: 5.31 K/UL — SIGNIFICANT CHANGE UP (ref 3.8–10.5)

## 2025-01-14 PROCEDURE — 99232 SBSQ HOSP IP/OBS MODERATE 35: CPT

## 2025-01-14 PROCEDURE — 99239 HOSP IP/OBS DSCHRG MGMT >30: CPT | Mod: GC

## 2025-01-14 PROCEDURE — 93010 ELECTROCARDIOGRAM REPORT: CPT

## 2025-01-14 PROCEDURE — G0545: CPT

## 2025-01-14 RX ORDER — SULFAMETHOXAZOLE/TRIMETHOPRIM 800-160 MG
1 TABLET ORAL
Qty: 18 | Refills: 0
Start: 2025-01-14 | End: 2025-01-22

## 2025-01-14 RX ORDER — VANCOMYCIN HYDROCHLORIDE 5 G/100ML
1000 INJECTION, POWDER, LYOPHILIZED, FOR SOLUTION INTRAVENOUS EVERY 8 HOURS
Refills: 0 | Status: DISCONTINUED | OUTPATIENT
Start: 2025-01-14 | End: 2025-01-14

## 2025-01-14 RX ORDER — SULFAMETHOXAZOLE/TRIMETHOPRIM 800-160 MG
1 TABLET ORAL
Refills: 0 | Status: DISCONTINUED | OUTPATIENT
Start: 2025-01-14 | End: 2025-01-15

## 2025-01-14 RX ADMIN — Medication 1 APPLICATION(S): at 16:39

## 2025-01-14 RX ADMIN — Medication 1 TABLET(S): at 18:13

## 2025-01-14 RX ADMIN — VANCOMYCIN HYDROCHLORIDE 250 MILLIGRAM(S): 5 INJECTION, POWDER, LYOPHILIZED, FOR SOLUTION INTRAVENOUS at 05:52

## 2025-01-14 RX ADMIN — Medication 1 APPLICATION(S): at 05:52

## 2025-01-14 NOTE — PROGRESS NOTE ADULT - ATTENDING COMMENTS
49F w/ PMHx HTN presenting for multiple skin abscesses, largest in R thigh with bloody drainage. She states that she waxes herself with reusable strips. Currently 4 abscesses noted, R posterior axilla, R thigh, LLQ of abdomen, and R lateral calf.     #Furunculosis  -seen by ID, c/w vanco given +MRSA swab, blood cultures show no growth and abscess culture is growing MRSA too  -s/p I&D by gen surg of R thigh abscess, recs appreciated    DC home today on PO bactrim to complete course of antibiotics.

## 2025-01-14 NOTE — DISCHARGE NOTE PROVIDER - CARE PROVIDER_API CALL
Allan Clemons  Internal Medicine  [address], [address]  [place] [address]  Phone: [phone]  Fax: [phone]  Established Patient  Follow Up Time: 1 week

## 2025-01-14 NOTE — DISCHARGE NOTE PROVIDER - NSDCMRMEDTOKEN_GEN_ALL_CORE_FT
ferrous fumarate 325 mg (106 mg elemental iron) oral tablet: 1 tab(s) orally once a day  Norvasc 5 mg oral tablet: 1 tab(s) orally once a day  tranexamic acid 650 mg oral tablet: 2 tab(s) orally 3 times a day   ferrous fumarate 325 mg (106 mg elemental iron) oral tablet: 1 tab(s) orally once a day  Norvasc 5 mg oral tablet: 1 tab(s) orally once a day  sulfamethoxazole-trimethoprim 800 mg-160 mg oral tablet: 1 tab(s) orally 2 times a day Take one tablet, every 12 hours (twice daily), until 1/22  tranexamic acid 650 mg oral tablet: 2 tab(s) orally 3 times a day   ferrous fumarate 325 mg (106 mg elemental iron) oral tablet: 1 tab(s) orally once a day  Norvasc 5 mg oral tablet: 1 tab(s) orally once a day  sulfamethoxazole-trimethoprim 800 mg-160 mg oral tablet: 1 tab(s) orally 3 times a day Take one tablet, every 8 hours (three times daily), until 1/22  tranexamic acid 650 mg oral tablet: 2 tab(s) orally 3 times a day

## 2025-01-14 NOTE — DISCHARGE NOTE PROVIDER - NSFOLLOWUPCLINICS_GEN_ALL_ED_FT
[facility]  Dermatology  [address]  [place] [address]  Phone: [phone]  Fax:   Follow Up Time: 1 week     [facility]  Dermatology  [address], [address] [address]  Phone: [phone]  Fax:   Follow Up Time: 1 week    [facility]  General Surgery  [address] [place] [address]  Phone: [phone]  Fax:   Follow Up Time: 1 week

## 2025-01-14 NOTE — PROGRESS NOTE ADULT - ASSESSMENT
49 year old woman pmh HTN, Kidney stones with hx of lithotripsy, anemia presenting with 4 abscesses for the past 6 days. They all started around the same time, one on the right thigh, one on the LLQ, one in the right arm pit, and right shin. The right thigh and LLQ are the largest and most painful and have been draining bloody fluid. SHe has never had an abscess or lump before ever. Upon work up patient afebrile and had 1x episode of HR of 100. Labs showed WBc 9.46 and H/H 10.8/33.6, and lactic acid 2.1 to 1.3, HIV negative and A1c 5.8. Patient under went I&D of R axillary which drained 1-2 cc blood tinged purulence which was sent for culture which gram stain doesn't show any organisms though culture still pending. ID consulted for further management.     *Multiple acute cutaneous abscess- with no h/o DM, no IVDU. No similar complaints in the past to suggest immunodeficiency. Liss MRSA infection  # Right thigh abscess- S/P I&D on 1/12 by surgery- wound cx with MRSA  *Anemia  *Lactic acidosis -- improved   *Pre-DM , HBA1c: 5.8%    Antimicrobials:  doxycycline 1/11 - current  cefazolin 1/11 - current  Vancomycin 1/12-- current     MICRO:   1/12 Abscess cx: moderate staph aureus   1/12 BCX: NGTD  1/11 MRSA nares PCR: positive   1/11 Abscess cx; MRSA (R- tetracyclines, S- bactrim)   1/11 HIV: negative     Recommendations:   - Planned for discharge today   - Can switch to PO bactrim 1 DS BID for a total of 10 days from 1/12/25 until 1/21/25  - blood cultures from 1/12- NGTD (although obtained after multiple doses of antibiotics)          Discussed with the primary team.  at the bedside, explained the plan in detail.   ID will sign off today. Thank you for the consultation. Please feel free to reach out with any questions or concerns.         Eve Christensen MD  Attending Physician, Division of Infectious Diseases  Department of Medicine   [facility]    Contact on TEAMS messaging from 9am - 5pm  Office: [phone] (after 5 PM or weekend).   49 year old woman pmh HTN, Kidney stones with hx of lithotripsy, anemia presenting with 4 abscesses for the past 6 days. They all started around the same time, one on the right thigh, one on the LLQ, one in the right arm pit, and right shin. The right thigh and LLQ are the largest and most painful and have been draining bloody fluid. SHe has never had an abscess or lump before ever. Upon work up patient afebrile and had 1x episode of HR of 100. Labs showed WBc 9.46 and H/H 10.8/33.6, and lactic acid 2.1 to 1.3, HIV negative and A1c 5.8. Patient under went I&D of R axillary which drained 1-2 cc blood tinged purulence which was sent for culture which gram stain doesn't show any organisms though culture still pending. ID consulted for further management.     *Multiple acute cutaneous abscess- with no h/o DM, no IVDU. No similar complaints in the past to suggest immunodeficiency. Liss MRSA infection  # Right thigh abscess- S/P I&D on 1/12 by surgery- wound cx with MRSA  *Anemia  *Lactic acidosis -- improved   *Pre-DM , HBA1c: 5.8%    Antimicrobials:  doxycycline 1/11 - current  cefazolin 1/11 - current  Vancomycin 1/12-- current     MICRO:   1/12 Abscess cx: moderate staph aureus   1/12 BCX: NGTD  1/11 MRSA nares PCR: positive   1/11 Abscess cx; MRSA (R- tetracyclines, S- bactrim)   1/11 HIV: negative     Recommendations:   - Planned for discharge today   - Can switch to PO bactrim 1 DS Q8H for a total of 10 days from 1/12/25 until 1/21/25  - blood cultures from 1/12- NGTD (although obtained after multiple doses of antibiotics)          Discussed with the primary team.  at the bedside, explained the plan in detail.   ID will sign off today. Thank you for the consultation. Please feel free to reach out with any questions or concerns.         Eve Christensen MD  Attending Physician, Division of Infectious Diseases  Department of Medicine   [facility]    Contact on TEAMS messaging from 9am - 5pm  Office: [phone] (after 5 PM or weekend).

## 2025-01-14 NOTE — DISCHARGE NOTE PROVIDER - NSDCFUSCHEDAPPT_GEN_ALL_CORE_FT
[facility]  [address]   Scheduled Appointment: 02/28/2025     Susana Roberts  [facility]  [address] [facility]  Scheduled Appointment: 01/21/2025    Allan Clemons  [facility]  [address] [facility]  Scheduled Appointment: 01/21/2025    [facility]  [facility]  [address]   Scheduled Appointment: 02/28/2025

## 2025-01-14 NOTE — DISCHARGE NOTE PROVIDER - HOSPITAL COURSE
HPI:  HPI: 50 y/o F with PMHx of HTN presenting with abscesses that have started approximately 2 weeks prior, one on the right thigh, one on the LLQ, one in the right arm pit, and right shin. 2 weeks ago, she began noticing nodules that started in her right axilla and right and left neck. The right and left neck nodules have since disappeared, but the right axillary skin lesion has persisted. Patient is unsure of when in the last two weeks that her left abdominal skin lesion has started. She then waxed the right/left thighs and the suprapubic region (did not wax sides of the abdomen or axillary regions) on Saturday (1/4/25), at which point the right thigh lesion appeared on Monday (1/6/25). Since then, the right thigh and LLQ are the largest and most painful and have been draining bloody fluid, the right thigh lesion also been increasing in warmth, tenderness, and erythema. She has never had an abscess or lump before ever. She denies trauma to the area/injections/trauma/shaving, but does admit to waxing of the areas with strips from local salon. She waxed 3 months prior as well. She waxed all of the areas that are affected but the areas that bother her the most are the RLE on the thigh and the L abdomen. Pt states ambulating has been challenging due to pain from R thigh wound.  [place] recent [place] since [place], denies pets at home. Endorses chills, but denies fevers, chest pain, sob, abd pain, n/v/d/c, dysuria.       ER Course:  -VS stable on admission, afebrile, but 1x episode of HR to 100. Initial labs showed WBC 9.46, H/H 10.8/33.6, lactic acid 2.1 -> 1.6 -> 1.3. HIV negative, A1c 5.8%. MRSA PCR: +.  -Patient was started initially on cefazolin and doxycycline. Wounds were re-examined the next day with extension of right thigh erythema, though noticeably less erythematous and warm. Patient under went R axillary expression which drained 1-2 cc blood tinged purulence which was sent for culture, gram stain doesn't show any organisms though still pending organisms.  -Per ID recommendations, the patient was discontinued on doxycycline, started on vancomycin, and continued on cefazolin, s/p 1LNS  -Endorses diarrhea today, notices improvement in her erythema of R thigh skin lesion   (12 Jan 2025 13:55)    Hospital Course:  The patient was admitted for likely furuncle/boil vs contact dermatitis with superimposed MRSA bacterial infection vs hidradenitis suppurativa. While in the ER, the patient's axillary lesion was expressed and sent for culture. General surgery team was consulted and an I&D was performed for the patient's right medial thigh abscess (confirmed on U/S), which was sent for culture as well. The patient was initially started on doxycycline and cefazolin, but this was discontinued in favor of vancomycin in accordance with the infectious disease team's recommendations as the wound cultures grew staph. aureus. The vancomycin dosing was adjusted during the patient's hospital visit based on vancomycin trough levels. The patient remained afebrile, normotensive, with a normal HR, and her WBC count downtrended.  On the day of discharge, the patient was medically stable and ready for discharge. The patient was discharged with the following antibiotic regimen, per the infectious disease team's recommendations: __________________      Important Medication Changes and Reason:  Antibiotic regimen: ____________________    Active or Pending Issues Requiring Follow-up:  Primary Care Follow-Up - within 1 week of discharge  Dermatology - within 1 week of discharge    Advanced Directives:   [X] Full code  [ ] DNR  [ ] Hospice    Discharge Diagnoses:  Furunculosis  Abscess due to staph. aureus      HPI:  HPI: 48 y/o F with PMHx of HTN presenting with abscesses that have started approximately 2 weeks prior, one on the right thigh, one on the LLQ, one in the right arm pit, and right shin. 2 weeks ago, she began noticing nodules that started in her right axilla and right and left neck. The right and left neck nodules have since disappeared, but the right axillary skin lesion has persisted. Patient is unsure of when in the last two weeks that her left abdominal skin lesion has started. She then waxed the right/left thighs and the suprapubic region (did not wax sides of the abdomen or axillary regions) on Saturday (1/4/25), at which point the right thigh lesion appeared on Monday (1/6/25). Since then, the right thigh and LLQ are the largest and most painful and have been draining bloody fluid, the right thigh lesion also been increasing in warmth, tenderness, and erythema. She has never had an abscess or lump before ever. She denies trauma to the area/injections/trauma/shaving, but does admit to waxing of the areas with strips from local salon. She waxed 3 months prior as well. She waxed all of the areas that are affected but the areas that bother her the most are the RLE on the thigh and the L abdomen. Pt states ambulating has been challenging due to pain from R thigh wound.  [place] recent travel[place] been there since July, denies pets at home. Endorses chills, but denies fevers, chest pain, sob, abd pain, n/v/d/c, dysuria.       ER Course:  -VS stable on admission, afebrile, but 1x episode of HR to 100. Initial labs showed WBC 9.46, H/H 10.8/33.6, lactic acid 2.1 -> 1.6 -> 1.3. HIV negative, A1c 5.8%. MRSA PCR: +.  -Patient was started initially on cefazolin and doxycycline. Wounds were re-examined the next day with extension of right thigh erythema, though noticeably less erythematous and warm. Patient under went R axillary expression which drained 1-2 cc blood tinged purulence which was sent for culture, gram stain doesn't show any organisms though still pending organisms.  -Per ID recommendations, the patient was discontinued on doxycycline, started on vancomycin, and continued on cefazolin, s/p 1LNS  -Endorses diarrhea today, notices improvement in her erythema of R thigh skin lesion   (12 Jan 2025 13:55)    Hospital Course:  The patient was admitted for likely furuncle/boil vs contact dermatitis with superimposed MRSA bacterial infection vs hidradenitis suppurativa. While in the ER, the patient's axillary lesion was expressed and sent for culture. General surgery team was consulted and an I&D was performed for the patient's right medial thigh abscess (confirmed on U/S), which was sent for culture as well. The patient was initially started on doxycycline and cefazolin, but this was discontinued in favor of vancomycin in accordance with the infectious disease team's recommendations as the wound cultures grew staph. aureus. The vancomycin dosing was adjusted during the patient's hospital visit based on vancomycin trough levels. The patient remained afebrile, normotensive, with a normal HR, and her WBC count downtrended.  On the day of discharge, the patient was medically stable and ready for discharge. The wound cultures resulted as MRSA+. The patient was discharged with the following antibiotic regimen, per the infectious disease team's recommendations: Bactrim 1 double-strength tablet twice a day until 1/22      Important Medication Changes and Reason:  Antibiotic regimen: Bactrim 1 double-strength tablet twice a day until 1/22    Active or Pending Issues Requiring Follow-up:  Primary Care Follow-Up - within 1 week of discharge  Dermatology - within 1 week of discharge  General Surgery Clinic - within 1 week of discharge for wound check    Advanced Directives:   [X] Full code  [ ] DNR  [ ] Hospice    Discharge Diagnoses:  Furunculosis  Abscess due to staph. aureus      HPI:  HPI: 50 y/o F with PMHx of HTN presenting with abscesses that have started approximately 2 weeks prior, one on the right thigh, one on the LLQ, one in the right arm pit, and right shin. 2 weeks ago, she began noticing nodules that started in her right axilla and right and left neck. The right and left neck nodules have since disappeared, but the right axillary skin lesion has persisted. Patient is unsure of when in the last two weeks that her left abdominal skin lesion has started. She then waxed the right/left thighs and the suprapubic region (did not wax sides of the abdomen or axillary regions) on Saturday (1/4/25), at which point the right thigh lesion appeared on Monday (1/6/25). Since then, the right thigh and LLQ are the largest and most painful and have been draining bloody fluid, the right thigh lesion also been increasing in warmth, tenderness, and erythema. She has never had an abscess or lump before ever. She denies trauma to the area/injections/trauma/shaving, but does admit to waxing of the areas with strips from local salon. She waxed 3 months prior as well. She waxed all of the areas that are affected but the areas that bother her the most are the RLE on the thigh and the L abdomen. Pt states ambulating has been challenging due to pain from R thigh wound.  [place] recent travel[place] been there since July, denies pets at home. Endorses chills, but denies fevers, chest pain, sob, abd pain, n/v/d/c, dysuria.       ER Course:  -VS stable on admission, afebrile, but 1x episode of HR to 100. Initial labs showed WBC 9.46, H/H 10.8/33.6, lactic acid 2.1 -> 1.6 -> 1.3. HIV negative, A1c 5.8%. MRSA PCR: +.  -Patient was started initially on cefazolin and doxycycline. Wounds were re-examined the next day with extension of right thigh erythema, though noticeably less erythematous and warm. Patient under went R axillary expression which drained 1-2 cc blood tinged purulence which was sent for culture, gram stain doesn't show any organisms though still pending organisms.  -Per ID recommendations, the patient was discontinued on doxycycline, started on vancomycin, and continued on cefazolin, s/p 1LNS  -Endorses diarrhea today, notices improvement in her erythema of R thigh skin lesion   (12 Jan 2025 13:55)    Hospital Course:  The patient was admitted for likely furuncle/boil vs contact dermatitis with superimposed MRSA bacterial infection vs hidradenitis suppurativa. While in the ER, the patient's axillary lesion was expressed and sent for culture. General surgery team was consulted and an I&D was performed for the patient's right medial thigh abscess (confirmed on U/S), which was sent for culture as well. The patient was initially started on doxycycline and cefazolin, but this was discontinued in favor of vancomycin in accordance with the infectious disease team's recommendations as the wound cultures grew staph. aureus. The vancomycin dosing was adjusted during the patient's hospital visit based on vancomycin trough levels. The patient remained afebrile, normotensive, with a normal HR, and her WBC count downtrended.  On the day of discharge, the patient was medically stable and ready for discharge. The wound cultures resulted as MRSA+. The patient was discharged with the following antibiotic regimen, per the infectious disease team's recommendations: Bactrim 1 double-strength tablet twice a day until 1/22      Important Medication Changes and Reason:  Antibiotic regimen: Bactrim 1 double-strength tablet three times per day until 1/22    Active or Pending Issues Requiring Follow-up:  Primary Care Follow-Up - within 1 week of discharge  Dermatology - within 1 week of discharge  General Surgery Clinic - within 1 week of discharge for wound check    Advanced Directives:   [X] Full code  [ ] DNR  [ ] Hospice    Discharge Diagnoses:  Furunculosis  Abscess due to staph. aureus      HPI:  HPI: 50 y/o F with PMHx of HTN presenting with abscesses that have started approximately 2 weeks prior, one on the right thigh, one on the LLQ, one in the right arm pit, and right shin. 2 weeks ago, she began noticing nodules that started in her right axilla and right and left neck. The right and left neck nodules have since disappeared, but the right axillary skin lesion has persisted. Patient is unsure of when in the last two weeks that her left abdominal skin lesion has started. She then waxed the right/left thighs and the suprapubic region (did not wax sides of the abdomen or axillary regions) on Saturday (1/4/25), at which point the right thigh lesion appeared on Monday (1/6/25). Since then, the right thigh and LLQ are the largest and most painful and have been draining bloody fluid, the right thigh lesion also been increasing in warmth, tenderness, and erythema. She has never had an abscess or lump before ever. She denies trauma to the area/injections/trauma/shaving, but does admit to waxing of the areas with strips from local salon. She waxed 3 months prior as well. She waxed all of the areas that are affected but the areas that bother her the most are the RLE on the thigh and the L abdomen. Pt states ambulating has been challenging due to pain from R thigh wound.  [place] recent travel[place] been there since July, denies pets at home. Endorses chills, but denies fevers, chest pain, sob, abd pain, n/v/d/c, dysuria.       ER Course:  -VS stable on admission, afebrile, but 1x episode of HR to 100. Initial labs showed WBC 9.46, H/H 10.8/33.6, lactic acid 2.1 -> 1.6 -> 1.3. HIV negative, A1c 5.8%. MRSA PCR: +.  -Patient was started initially on cefazolin and doxycycline. Wounds were re-examined the next day with extension of right thigh erythema, though noticeably less erythematous and warm. Patient under went R axillary expression which drained 1-2 cc blood tinged purulence which was sent for culture, gram stain doesn't show any organisms though still pending organisms.  -Per ID recommendations, the patient was discontinued on doxycycline, started on vancomycin, and continued on cefazolin, s/p 1LNS  -Endorses diarrhea today, notices improvement in her erythema of R thigh skin lesion   (12 Jan 2025 13:55)    Hospital Course:  The patient was admitted for likely furuncle/boil vs contact dermatitis with superimposed MRSA bacterial infection vs hidradenitis suppurativa. While in the ER, the patient's axillary lesion was expressed and sent for culture. General surgery team was consulted and an I&D was performed for the patient's right medial thigh abscess (confirmed on U/S), which was sent for culture as well. The patient was initially started on doxycycline and cefazolin, but this was discontinued in favor of vancomycin in accordance with the infectious disease team's recommendations as the wound cultures grew staph. aureus. The vancomycin dosing was adjusted during the patient's hospital visit based on vancomycin trough levels. The patient remained afebrile, normotensive, with a normal HR, and her WBC count downtrended.  On the day of discharge, the patient was medically stable and ready for discharge. The wound cultures resulted as MRSA+. The patient was discharged with the following antibiotic regimen, per the infectious disease team's recommendations: Bactrim 1 double-strength tablet 3x a day until 1/22      Important Medication Changes and Reason:  Antibiotic regimen: Bactrim 1 double-strength tablet three times per day until 1/22    Active or Pending Issues Requiring Follow-up:  Primary Care Follow-Up - within 1 week of discharge  Dermatology - within 1 week of discharge  General Surgery Clinic - within 1 week of discharge for wound check    Advanced Directives:   [X] Full code  [ ] DNR  [ ] Hospice    Discharge Diagnoses:  Furunculosis  Abscess due to staph. aureus

## 2025-01-14 NOTE — DISCHARGE NOTE PROVIDER - NSDCCPCAREPLAN_GEN_ALL_CORE_FT
PRINCIPAL DISCHARGE DIAGNOSIS  Diagnosis: Abscess of multiple sites  Assessment and Plan of Treatment: You came in with multiple skin wounds - one in your armpit area, one on the left side of your abdomen, on your right shin, and a larger one on your right thigh. You also had chills, and your heart rate was elevated. Pus was collected from the armpit wound, and an incision & drainage was performed on your right thigh abscess. Based on the results of the pus that was collected and the bacteria that grew, you were started on an appropriate antibiotic medication in consultation with the infectious disease physicians. Your skin wounds were likely a combination of an abscess and furunculosis. Please see your primary care physician within 1 week of discharge (you should also re-initiate amlodipine or an alternative blood pressure medication with your primary care doctor). Please see a dermatologist (referral provided) within 1 week of discharge. If you develop worsening skin wounds, fevers, chills, nausea, vomiting, or feel ill - then please return urgently to the hospital for further evaluation and treatment. Please continue to take the antibiotic ___________ until ____, according to recommendations from the infectious disease physicians.     PRINCIPAL DISCHARGE DIAGNOSIS  Diagnosis: Abscess of multiple sites  Assessment and Plan of Treatment: You came in with multiple skin wounds - one in your armpit area, one on the left side of your abdomen, on your right shin, and a larger one on your right thigh. You also had chills, and your heart rate was elevated. Pus was collected from the armpit wound, and an incision & drainage was performed on your right thigh abscess. Based on the results of the pus that was collected and the bacteria that grew, you were started on an appropriate antibiotic medication in consultation with the infectious disease physicians. Your skin wounds were likely a combination of an abscess and furunculosis. Please see your primary care physician within 1 week of discharge (you should also re-initiate amlodipine or an alternative blood pressure medication with your primary care doctor). Please see a dermatologist (referral provided) within 1 week of discharge. Please visit the general surgery clinic within 1 week of discharge for follow-up of your incision & drainage. If you develop worsening skin wounds, fevers, chills, nausea, vomiting, or feel ill - then please return urgently to the hospital for further evaluation and treatment. Please continue to take the antibiotic trimethoprim-sulfamethoxazole until 1/22, twice daily, according to recommendations from the infectious disease physicians.     PRINCIPAL DISCHARGE DIAGNOSIS  Diagnosis: Abscess of multiple sites  Assessment and Plan of Treatment: You came in with multiple skin wounds - one in your armpit area, one on the left side of your abdomen, on your right shin, and a larger one on your right thigh. You also had chills, and your heart rate was elevated. Pus was collected from the armpit wound, and an incision & drainage was performed on your right thigh abscess. Based on the results of the pus that was collected and the bacteria that grew, you were started on an appropriate antibiotic medication in consultation with the infectious disease physicians. Your skin wounds were likely a combination of an abscess and furunculosis. Please see your primary care physician within 1 week of discharge (you should also re-initiate amlodipine or an alternative blood pressure medication with your primary care doctor). Please see a dermatologist (referral provided) within 1 week of discharge. Please visit the general surgery clinic within 1 week of discharge for follow-up of your incision & drainage. If you develop worsening skin wounds, fevers, chills, nausea, vomiting, or feel ill - then please return urgently to the hospital for further evaluation and treatment. Please continue to take the antibiotic trimethoprim-sulfamethoxazole until 1/22, three times daily, according to recommendations from the infectious disease physicians. WE have confirmed it will be three times daily on discharge and not twice daily.

## 2025-01-14 NOTE — DISCHARGE NOTE PROVIDER - NSDCCPTREATMENT_GEN_ALL_CORE_FT
PRINCIPAL PROCEDURE  Procedure: Simple incision and drainage of skin abscess  Findings and Treatment: Incision and drainage of right medial thigh skin abscess which grew staph. aureus.      SECONDARY PROCEDURE  Procedure: Wound ultrasound  Findings and Treatment: ACC: [id number] EXAM:  US NONVASC EXT LTD RT   ORDERED BY:  CHANCE RIOS   PROCEDURE DATE:  01/12/2025    INTERPRETATION:  CLINICAL INFORMATION: Right thigh collection  COMPARISON: None available.  TECHNIQUE:  Grayscale ultrasoundand color Doppler were performed in the   area of interest.  FINDINGS:  A collection in the right medial thigh measures 4.1 x 1.5 x 4.0 cm. There   is associated hyperemia and edema. There is increased echogenicity   involving the adjacent fat planes. There is no internal vascularity.  IMPRESSION:  Findings consistent with abscess in the area of interest measuring up to   4.1 cm

## 2025-01-14 NOTE — DISCHARGE NOTE PROVIDER - NSDCFUADDAPPT_GEN_ALL_CORE_FT
APPTS ARE READY TO BE MADE: [X] YES    Best Family or Patient Contact (if needed):    Additional Information about above appointments (if needed):    1: Primary Care Physician - Within 1 week of discharge  2: Dermatologist - within 1 week of discharge  3:     Other comments or requests:    APPTS ARE READY TO BE MADE: [X] YES    Best Family or Patient Contact (if needed):    Additional Information about above appointments (if needed):    1: Primary Care Physician - Within 1 week of discharge  2: Dermatologist - within 1 week of discharge  3: General Surgery Clinic - within 1 week of discharge    Other comments or requests:    APPTS ARE READY TO BE MADE: [X] YES    Best Family or Patient Contact (if needed):    Additional Information about above appointments (if needed):    1: Primary Care Physician - Within 1 week of discharge  2: Dermatologist - within 1 week of discharge  3: General Surgery Clinic - within 1 week of discharge    Other comments or requests:       Dermatology:  Prior to outreaching the patient, it was visible that the patient has secured a follow up appointment which was not scheduled by our team.  Dr WILMER MELENDEZ,[facility] 1/21/2025 9:45 AM    Internal Medicine:  Prior to outreaching the patient, it was visible that the patient has secured a follow up appointment which was not scheduled by our team.  Allan Beltre 1/21/2025 4:40 PM     APPTS ARE READY TO BE MADE: [X] YES    Best Family or Patient Contact (if needed):    Additional Information about above appointments (if needed):    1: Primary Care Physician - Within 1 week of discharge  2: Dermatologist - within 1 week of discharge  3: General Surgery Clinic - within 1 week of discharge    Other comments or requests:       Dermatology:  Prior to outreaching the patient, it was visible that the patient has secured a follow up appointment which was not scheduled by our team.  Dr WILMER MELENDEZ,[facility] 1/21/2025 9:45 AM    Internal Medicine:  Prior to outreaching the patient, it was visible that the patient has secured a follow up appointment which was not scheduled by our team.  Allan Beltre 1/21/2025 4:40 PM    Surgery:  Patient advised they did not want to proceed with scheduling appointments with the providers on this referral. They will coordinate care on their own. Pt will call if Dermatologist feels she need this appointment.

## 2025-01-15 ENCOUNTER — TRANSCRIPTION ENCOUNTER (OUTPATIENT)
Age: 50
End: 2025-01-15

## 2025-01-15 VITALS
SYSTOLIC BLOOD PRESSURE: 122 MMHG | TEMPERATURE: 98 F | HEART RATE: 80 BPM | DIASTOLIC BLOOD PRESSURE: 81 MMHG | OXYGEN SATURATION: 96 % | RESPIRATION RATE: 18 BRPM

## 2025-01-15 LAB
HCT VFR BLD CALC: 30 % — LOW (ref 34.5–45)
HGB BLD-MCNC: 9.2 G/DL — LOW (ref 11.5–15.5)
MCHC RBC-ENTMCNC: 27.5 PG — SIGNIFICANT CHANGE UP (ref 27–34)
MCHC RBC-ENTMCNC: 30.7 G/DL — LOW (ref 32–36)
MCV RBC AUTO: 89.6 FL — SIGNIFICANT CHANGE UP (ref 80–100)
NRBC # BLD: 0 /100 WBCS — SIGNIFICANT CHANGE UP (ref 0–0)
PLATELET # BLD AUTO: 335 K/UL — SIGNIFICANT CHANGE UP (ref 150–400)
RBC # BLD: 3.35 M/UL — LOW (ref 3.8–5.2)
RBC # FLD: 12.8 % — SIGNIFICANT CHANGE UP (ref 10.3–14.5)
WBC # BLD: 5.01 K/UL — SIGNIFICANT CHANGE UP (ref 3.8–10.5)
WBC # FLD AUTO: 5.01 K/UL — SIGNIFICANT CHANGE UP (ref 3.8–10.5)

## 2025-01-15 PROCEDURE — 99285 EMERGENCY DEPT VISIT HI MDM: CPT

## 2025-01-15 PROCEDURE — 85025 COMPLETE CBC W/AUTO DIFF WBC: CPT

## 2025-01-15 PROCEDURE — 84295 ASSAY OF SERUM SODIUM: CPT

## 2025-01-15 PROCEDURE — 80048 BASIC METABOLIC PNL TOTAL CA: CPT

## 2025-01-15 PROCEDURE — 82947 ASSAY GLUCOSE BLOOD QUANT: CPT

## 2025-01-15 PROCEDURE — 87205 SMEAR GRAM STAIN: CPT

## 2025-01-15 PROCEDURE — 82435 ASSAY OF BLOOD CHLORIDE: CPT

## 2025-01-15 PROCEDURE — 96375 TX/PRO/DX INJ NEW DRUG ADDON: CPT

## 2025-01-15 PROCEDURE — 83605 ASSAY OF LACTIC ACID: CPT

## 2025-01-15 PROCEDURE — 82330 ASSAY OF CALCIUM: CPT

## 2025-01-15 PROCEDURE — 85014 HEMATOCRIT: CPT

## 2025-01-15 PROCEDURE — 76882 US LMTD JT/FCL EVL NVASC XTR: CPT

## 2025-01-15 PROCEDURE — 82803 BLOOD GASES ANY COMBINATION: CPT

## 2025-01-15 PROCEDURE — 93005 ELECTROCARDIOGRAM TRACING: CPT

## 2025-01-15 PROCEDURE — 84132 ASSAY OF SERUM POTASSIUM: CPT

## 2025-01-15 PROCEDURE — 85027 COMPLETE CBC AUTOMATED: CPT

## 2025-01-15 PROCEDURE — 96374 THER/PROPH/DIAG INJ IV PUSH: CPT

## 2025-01-15 PROCEDURE — 85018 HEMOGLOBIN: CPT

## 2025-01-15 PROCEDURE — 86703 HIV-1/HIV-2 1 RESULT ANTBDY: CPT

## 2025-01-15 PROCEDURE — 80053 COMPREHEN METABOLIC PANEL: CPT

## 2025-01-15 PROCEDURE — 87186 SC STD MICRODIL/AGAR DIL: CPT

## 2025-01-15 PROCEDURE — 87077 CULTURE AEROBIC IDENTIFY: CPT

## 2025-01-15 PROCEDURE — 80202 ASSAY OF VANCOMYCIN: CPT

## 2025-01-15 PROCEDURE — 99231 SBSQ HOSP IP/OBS SF/LOW 25: CPT | Mod: GC

## 2025-01-15 RX ORDER — SULFAMETHOXAZOLE/TRIMETHOPRIM 800-160 MG
1 TABLET ORAL
Qty: 27 | Refills: 0
Start: 2025-01-15 | End: 2025-01-23

## 2025-01-15 RX ADMIN — Medication 1 APPLICATION(S): at 05:18

## 2025-01-15 RX ADMIN — Medication 1 TABLET(S): at 05:18

## 2025-01-15 NOTE — PROGRESS NOTE ADULT - ATTENDING COMMENTS
49F w/ PMHx HTN presenting for multiple skin abscesses, largest in R thigh with bloody drainage. She states that she waxes herself with reusable strips. Currently 4 abscesses noted, R posterior axilla, R thigh, LLQ of abdomen, and R lateral calf.     #Furunculosis  -seen by ID, c/w vanco given +MRSA swab, blood cultures show no growth and abscess culture is growing MRSA too  -s/p I&D by gen surg of R thigh abscess, recs appreciated    DC home today on PO bactrim to complete course of antibiotics. Was supposed to be discharged yesterday but home care/nursing for her wound was not approved yet.

## 2025-01-15 NOTE — PROGRESS NOTE ADULT - PROBLEM SELECTOR PLAN 2
-s/p expression of R axillary, wound cx sent   -Ultrasound: abscess collection in the right medial thigh measures 4.1 x 1.5 x 4.0 cm.   -s/p I&D of R thigh lesion on 01/12    PLAN:  >See plan above   >f/u expressed Wound Cx: few staph. aureus  >f/u I&D Wound Cx: moderate staph. aureus
-s/p expression of R axillary, wound cx sent   -Ultrasound: abscess collection in the right medial thigh measures 4.1 x 1.5 x 4.0 cm.   -s/p I&D of R thigh lesion on 01/12    PLAN:  >See plan above   >f/u expressed Wound Cx: NGTD, negative gram stain  >f/u I&D Wound Cx: NGTD
-s/p expression of R axillary, wound cx sent   -Ultrasound: abscess collection in the right medial thigh measures 4.1 x 1.5 x 4.0 cm.   -s/p I&D of R thigh lesion on 01/12    PLAN:  >See plan above   >f/u expressed Wound Cx: few MRSA  >f/u I&D Wound Cx: moderate MRSA   >c/w Daily dressing change with 1/4" packing in the I&D site of the R thigh, guaze, and paper tape  >Will need wound-care home services on discharge

## 2025-01-15 NOTE — PROGRESS NOTE ADULT - ASSESSMENT
50 y/o F with PMHx of HTN p/w 2x weeks of 4 abscesses after waxing affected areas s/p I&D of R axilla and R thigh - likely furuncle/boil vs contact dermatitis with superimposed MRSA bacterial infection vs hidradenitis suppurativa

## 2025-01-15 NOTE — PROGRESS NOTE ADULT - PROBLEM SELECTOR PLAN 4
-On home norvasc 5mg    PLAN:  >c/w norvasc 5mg

## 2025-01-15 NOTE — DISCHARGE NOTE NURSING/CASE MANAGEMENT/SOCIAL WORK - PATIENT PORTAL LINK FT
You can access the FollowMyHealth Patient Portal offered by [facility] by registering at the following website: [email or web address]. By joining [facility]’s FollowMyHealth portal, you will also be able to view your health information using other applications (apps) compatible with our system.

## 2025-01-15 NOTE — PROGRESS NOTE ADULT - SUBJECTIVE AND OBJECTIVE BOX
Patient is a 49y old  Female who presents with a chief complaint of abscess (13 Jan 2025 12:37)    Being followed by ID for cutaneous abscess     Interval history:  wound cx with staph aureus   WBC stable   Bcx pending   afebrile   On vancomycin   Right thigh I&D on 1/12 by surgery      ROS:  No cough,SOB,CP  No N/V/D  No abd pain  No urinary complaints  No HA      Antimicrobials:  vancomycin  IVPB 1000 milliGRAM(s) IV Intermittent every 12 hours      Vital Signs Last 24 Hrs  T(C): 37.2 (01-13-25 @ 12:57), Max: 37.9 (01-12-25 @ 21:10)  T(F): 99 (01-13-25 @ 12:57), Max: 100.3 (01-12-25 @ 21:10)  HR: 84 (01-13-25 @ 12:57) (84 - 109)  BP: 133/75 (01-13-25 @ 12:57) (115/76 - 144/89)  BP(mean): --  RR: 18 (01-13-25 @ 12:57) (17 - 18)  SpO2: 99% (01-13-25 @ 12:57) (97% - 100%)    Physical Exam:  General: Patient appears comfortable, no acute distress  HEENT: NCAT, PERRL, anicteric sclera, mucous membranes moist and intact  Neck: Supple, No lymphadenopathy  CV: +S1/S2, RRR, no M/R/G  Lungs: No respiratory distress, CTA b/l, no wheezing, rales or rhonchi  Abd:  BS4+, Soft, NTND, no guarding, small area of superficial firmness in the skin of the LLQ  : No suprapubic tenderness  Neuro: AAOx3. No focal deficits noted.   Ext: No cyanosis, no edema, well healing wound of the lateral distal R lower extremity, proximal medial aspect of the RLE with area of induration wiht purulent bloody drainage and pain wit palpation and surrounding erythema, L axilla with small 1cm area of firmness though no pain no erythema  Msk: freely moving upper and lower extremities  Skin: No rash, no phlebitis     Lab Data:                        10.1   4.54  )-----------( 338      ( 13 Jan 2025 06:29 )             32.6     01-13    138  |  102  |  12  ----------------------------<  118[H]  4.1   |  22  |  0.65    Ca    8.8      13 Jan 2025 06:29    TPro  7.5  /  Alb  4.0  /  TBili  0.2  /  DBili  x   /  AST  16  /  ALT  16  /  AlkPhos  95  01-11      .Abscess  01-11-25   Few Staphylococcus aureus  --    No polymorphonuclear leukocytes seen per low power field  No organisms seen per oil power field              RADIOLOGY:  below reviewed  FINDINGS:    A collection in the right medial thigh measures 4.1 x 1.5 x 4.0 cm. There   is associated hyperemia and edema. There is increased echogenicity   involving the adjacent fat planes. There is no internal vascularity.    IMPRESSION:    Findings consistent with abscess in the area of interest measuring up to   4.1 cm    --- End of Report ---      
SUBJECTIVE / OVERNIGHT EVENTS:  Pt seen and examined at bedside. Pt was medically stable for discharge on bactrim yesterday for MRSA+ wound cultures, but stayed overnight as home services for wound care could not be setup in time. Pt yesterday refused IV vancomycin -> switched to PO Bactrim. Complained of chest pain, R hand tingling yesterday @6PM, pain was in the left rib area and tender to palpation; EKG without ST changes or T-wave inversions.     MEDICATIONS  (STANDING):  amLODIPine   Tablet 5 milliGRAM(s) Oral daily  influenza   Vaccine 0.5 milliLiter(s) IntraMuscular once  mupirocin 2% Nasal 1 Application(s) Both Nostrils two times a day  trimethoprim  160 mG/sulfamethoxazole 800 mG 1 Tablet(s) Oral two times a day    MEDICATIONS  (PRN):  acetaminophen     Tablet .. 650 milliGRAM(s) Oral every 6 hours PRN Moderate Pain (4 - 6)  melatonin 3 milliGRAM(s) Oral at bedtime PRN Insomnia  ondansetron    Tablet 4 milliGRAM(s) Oral every 6 hours PRN Nausea and/or Vomiting        01-14-25 @ 07:01  -  01-15-25 @ 07:00  --------------------------------------------------------  IN: 960 mL / OUT: 0 mL / NET: 960 mL        PHYSICAL EXAM:  Vital Signs Last 24 Hrs  T(C): 37 (15 Duy 2025 05:20), Max: 37 (14 Jan 2025 11:00)  T(F): 98.6 (15 Duy 2025 05:20), Max: 98.6 (14 Jan 2025 11:00)  HR: 75 (15 Duy 2025 05:20) (75 - 87)  BP: 131/76 (15 Duy 2025 05:20) (116/76 - 131/76)  BP(mean): --  RR: 18 (15 Duy 2025 05:20) (18 - 18)  SpO2: 98% (15 Duy 2025 05:20) (98% - 99%)    Parameters below as of 15 Duy 2025 05:20  Patient On (Oxygen Delivery Method): room air        CAPILLARY BLOOD GLUCOSE        I&O's Summary    14 Jan 2025 07:01  -  15 Duy 2025 07:00  --------------------------------------------------------  IN: 960 mL / OUT: 0 mL / NET: 960 mL    General: Patient appears comfortable, no acute distress  HEENT: NCAT, PERRL, anicteric sclera, mucous membranes moist and intact  Neck: Supple, No lymphadenopathy. Hirsutism present.   CV: +S1/S2, RRR, no M/R/G  Lungs: No respiratory distress, CTA b/l, no wheezing, rales or rhonchi  Abd:  BS4+, Soft, NTND, no guarding, small area of superficial firmness in the skin of the LLQ  : No suprapubic tenderness  Neuro: AAOx3. No focal deficits noted.   Ext: No cyanosis, no edema, R medial proximal thigh approx 6-7cm diameter region of induration, excoriation with trace blood, no purulent drainage, no induration -> s/p incision & drainage. Smaller region of erythema approx 8cm in diameter LLQ with small central induration, no fluctuance, eschar vs healing excoriation. R axilla with approx 1cm region of induration with central fluctuance, small region of surrounding erythema. Additional punctate region of healing excoriation no right loweral lower leg, proximal 1/3 without drainage or appreciable surrounding erythema. No crepitus.  Msk: freely moving upper and lower extremities  Skin: No rash, no phlebitis      LABS:                        9.2    5.01  )-----------( 335      ( 15 Duy 2025 06:43 )             30.0     01-14    141  |  106  |  10  ----------------------------<  89  3.6   |  21[L]  |  0.66    Ca    8.9      14 Jan 2025 07:18            Urinalysis Basic - ( 14 Jan 2025 07:18 )    Color: x / Appearance: x / SG: x / pH: x  Gluc: 89 mg/dL / Ketone: x  / Bili: x / Urobili: x   Blood: x / Protein: x / Nitrite: x   Leuk Esterase: x / RBC: x / WBC x   Sq Epi: x / Non Sq Epi: x / Bacteria: x        Culture - Abscess with Gram Stain (collected 12 Jan 2025 16:04)  Source: .Abscess  Preliminary Report (14 Jan 2025 22:00):    Moderate Methicillin Resistant Staphylococcus aureus  Organism: Methicillin resistant Staphylococcus aureus (14 Jan 2025 22:00)  Organism: Methicillin resistant Staphylococcus aureus (14 Jan 2025 22:00)    Culture - Blood (collected 12 Jan 2025 09:30)  Source: .Blood BLOOD  Preliminary Report (14 Jan 2025 14:01):    No growth at 48 Hours    Culture - Blood (collected 12 Jan 2025 09:15)  Source: .Blood BLOOD  Preliminary Report (14 Jan 2025 14:01):    No growth at 48 Hours        IMAGING:    [X] All pertinent imaging reviewed by me
SURGERY DAILY PROGRESS NOTE    SUBJECTIVE: Patient seen and evaluated on AM rounds. s/p R thigh I&D yesterday. Patient reports improvement in her pain and improvement of the erythema.    OBJECTIVE:  Vital Signs Last 24 Hrs  T(C): 37.1 (13 Jan 2025 08:04), Max: 37.9 (12 Jan 2025 21:10)  T(F): 98.7 (13 Jan 2025 08:04), Max: 100.3 (12 Jan 2025 21:10)  HR: 98 (13 Jan 2025 08:04) (92 - 109)  BP: 144/89 (13 Jan 2025 08:04) (115/76 - 144/89)  BP(mean): --  RR: 18 (13 Jan 2025 08:04) (17 - 18)  SpO2: 100% (13 Jan 2025 08:04) (97% - 100%)    Parameters below as of 13 Jan 2025 08:04  Patient On (Oxygen Delivery Method): room air    Daily   MEDICATIONS  (STANDING):  amLODIPine   Tablet 5 milliGRAM(s) Oral daily  ceFAZolin   IVPB 1000 milliGRAM(s) IV Intermittent every 8 hours  ceFAZolin   IVPB      mupirocin 2% Nasal 1 Application(s) Both Nostrils two times a day  vancomycin  IVPB 1000 milliGRAM(s) IV Intermittent every 12 hours    MEDICATIONS  (PRN):  acetaminophen     Tablet .. 650 milliGRAM(s) Oral every 6 hours PRN Moderate Pain (4 - 6)  melatonin 3 milliGRAM(s) Oral at bedtime PRN Insomnia  ondansetron    Tablet 4 milliGRAM(s) Oral every 6 hours PRN Nausea and/or Vomiting      LABS:                        10.1   4.54  )-----------( 338      ( 13 Jan 2025 06:29 )             32.6     01-13    138  |  102  |  12  ----------------------------<  118[H]  4.1   |  22  |  0.65    Ca    8.8      13 Jan 2025 06:29    TPro  7.5  /  Alb  4.0  /  TBili  0.2  /  DBili  x   /  AST  16  /  ALT  16  /  AlkPhos  95  01-11    Urinalysis Basic - ( 13 Jan 2025 06:29 )    Color: x / Appearance: x / SG: x / pH: x  Gluc: 118 mg/dL / Ketone: x  / Bili: x / Urobili: x   Blood: x / Protein: x / Nitrite: x   Leuk Esterase: x / RBC: x / WBC x   Sq Epi: x / Non Sq Epi: x / Bacteria: x    PHYSICAL EXAM:  Constitutional: NAD  Pulmonary: Symmetric chest rise bilaterally, no increased WOB  Gastrointestinal: Abdomen soft, nontender, non-distended, LLQ scab with mild surrounding erythema, no induration or purulence  Extremities: R thigh I&D site draining small volume hemo-purulent fluid, packing changed, surrounding erythema and induration improving    
Patient is a 49y old  Female who presents with a chief complaint of abscess (14 Jan 2025 09:12)    Being followed by ID for cutaneous abscess     Interval history:  wound cx with MRSA from admission- R- tetracyclines. S- bactrim  WBC stable   Bcx NGTD  R hip abscess- much improved, erythema reduced and pain improved   afebrile   On vancomycin   Right thigh I&D on 1/12 by surgery- wound cultures with staph aureus       ROS:  No cough,SOB,CP  No N/V/D  No abd pain  No urinary complaints  No HA      Antimicrobials:  vancomycin  IVPB 1000 milliGRAM(s) IV Intermittent every 8 hours      Vital Signs Last 24 Hrs  T(C): 37 (01-14-25 @ 11:00), Max: 37.2 (01-13-25 @ 18:00)  T(F): 98.6 (01-14-25 @ 11:00), Max: 99 (01-13-25 @ 18:00)  HR: 87 (01-14-25 @ 11:00) (78 - 92)  BP: 116/76 (01-14-25 @ 11:00) (116/76 - 138/86)  BP(mean): --  RR: 18 (01-14-25 @ 11:00) (18 - 18)  SpO2: 98% (01-14-25 @ 11:00) (97% - 98%)    Physical Exam:  General: Patient appears comfortable, no acute distress  HEENT: NCAT, PERRL, anicteric sclera, mucous membranes moist and intact  Neck: Supple, No lymphadenopathy  CV: +S1/S2, RRR, no M/R/G  Lungs: No respiratory distress, CTA b/l, no wheezing, rales or rhonchi  Abd:  BS4+, Soft, NTND, no guarding, small area of superficial firmness in the skin of the LLQ  : No suprapubic tenderness  Neuro: AAOx3. No focal deficits noted.   Ext: No cyanosis, no edema, well healing wound of the lateral distal R lower extremity, proximal medial aspect of the RLE with area of induration, dressing intact- erythema improved, swelling improved.  L axilla with small 1cm area of firmness though no pain no erythema  Msk: freely moving upper and lower extremities  Skin: No rash, no phlebitis   Lab Data:                        9.8    5.31  )-----------( 340      ( 14 Jan 2025 07:13 )             31.7     01-14    141  |  106  |  10  ----------------------------<  89  3.6   |  21[L]  |  0.66    Ca    8.9      14 Jan 2025 07:18        .Abscess  01-12-25   Moderate Staphylococcus aureus  --  --      .Blood BLOOD  01-12-25   No growth at 48 Hours  --  --      .Blood BLOOD  01-12-25   No growth at 48 Hours  --  --      .Abscess  01-11-25   Few Methicillin Resistant Staphylococcus aureus  --  Methicillin resistant Staphylococcus aureus              Vancomycin Level, Trough: 4.9 ug/mL (01-14-25 @ 07:18)        RADIOLOGY:  below reviewed  FINDINGS:    A collection in the right medial thigh measures 4.1 x 1.5 x 4.0 cm. There   is associated hyperemia and edema. There is increased echogenicity   involving the adjacent fat planes. There is no internal vascularity.    IMPRESSION:    Findings consistent with abscess in the area of interest measuring up to   4.1 cm    --- End of Report ---        
SUBJECTIVE / OVERNIGHT EVENTS:  Pt seen and examined at bedside. s/p ID of R thigh abscess last night, febrile overnight and tachycardic    MEDICATIONS  (STANDING):  amLODIPine   Tablet 5 milliGRAM(s) Oral daily  ceFAZolin   IVPB 1000 milliGRAM(s) IV Intermittent every 8 hours  ceFAZolin   IVPB      lactated ringers. 500 milliLiter(s) (50 mL/Hr) IV Continuous <Continuous>  mupirocin 2% Nasal 1 Application(s) Both Nostrils two times a day  vancomycin  IVPB 1000 milliGRAM(s) IV Intermittent every 12 hours    MEDICATIONS  (PRN):  acetaminophen     Tablet .. 650 milliGRAM(s) Oral every 6 hours PRN Moderate Pain (4 - 6)  melatonin 3 milliGRAM(s) Oral at bedtime PRN Insomnia  ondansetron    Tablet 4 milliGRAM(s) Oral every 6 hours PRN Nausea and/or Vomiting          PHYSICAL EXAM:  Vital Signs Last 24 Hrs  T(C): 37.1 (13 Jan 2025 08:04), Max: 37.9 (12 Jan 2025 21:10)  T(F): 98.7 (13 Jan 2025 08:04), Max: 100.3 (12 Jan 2025 21:10)  HR: 98 (13 Jan 2025 08:04) (92 - 109)  BP: 144/89 (13 Jan 2025 08:04) (115/76 - 144/89)  BP(mean): --  RR: 18 (13 Jan 2025 08:04) (17 - 18)  SpO2: 100% (13 Jan 2025 08:04) (97% - 100%)    Parameters below as of 13 Jan 2025 08:04  Patient On (Oxygen Delivery Method): room air        CAPILLARY BLOOD GLUCOSE        I&O's Summary      General: Patient appears comfortable, no acute distress  HEENT: NCAT, PERRL, anicteric sclera, mucous membranes moist and intact  Neck: Supple, No lymphadenopathy. Hirsutism present.   CV: +S1/S2, RRR, no M/R/G  Lungs: No respiratory distress, CTA b/l, no wheezing, rales or rhonchi  Abd:  BS4+, Soft, NTND, no guarding, small area of superficial firmness in the skin of the LLQ  : No suprapubic tenderness  Neuro: AAOx3. No focal deficits noted.   Ext: No cyanosis, no edema, R medial proximal thigh, 16cm+ in diameter, Approx 6-7cm diameter region of induration, excoriation with trace blood, no purulent drainage, no induration. Smaller region of erythema approx 8cm in diameter LLQ with small central induration, no fluctuance, eschar vs healing excoriation. R axilla with approx 1cm region of induration with central fluctuance, drained approx 1-2cc of purulent- blood-tinged material expressed, small region of surrounding erythema. Additional punctate region of healing excoriation no right loweral lower leg, proximal 1/3 without drainage or appreciable surrounding erythema. No crepitus.  Msk: freely moving upper and lower extremities  Skin: No rash, no phlebitis    LABS:                        10.1   4.54  )-----------( 338      ( 13 Jan 2025 06:29 )             32.6     01-13    138  |  102  |  12  ----------------------------<  118[H]  4.1   |  22  |  0.65    Ca    8.8      13 Jan 2025 06:29    TPro  7.5  /  Alb  4.0  /  TBili  0.2  /  DBili  x   /  AST  16  /  ALT  16  /  AlkPhos  95  01-11          Urinalysis Basic - ( 13 Jan 2025 06:29 )    Color: x / Appearance: x / SG: x / pH: x  Gluc: 118 mg/dL / Ketone: x  / Bili: x / Urobili: x   Blood: x / Protein: x / Nitrite: x   Leuk Esterase: x / RBC: x / WBC x   Sq Epi: x / Non Sq Epi: x / Bacteria: x        Culture - Abscess with Gram Stain (collected 11 Jan 2025 17:15)  Source: .Abscess  Gram Stain (12 Jan 2025 06:05):    No polymorphonuclear leukocytes seen per low power field    No organisms seen per oil power field        IMAGING:    [X] All pertinent imaging reviewed by me
SUBJECTIVE / OVERNIGHT EVENTS:  Pt seen and examined at bedside. FREEDOM.    MEDICATIONS  (STANDING):  amLODIPine   Tablet 5 milliGRAM(s) Oral daily  influenza   Vaccine 0.5 milliLiter(s) IntraMuscular once  mupirocin 2% Nasal 1 Application(s) Both Nostrils two times a day  vancomycin  IVPB 1000 milliGRAM(s) IV Intermittent every 12 hours    MEDICATIONS  (PRN):  acetaminophen     Tablet .. 650 milliGRAM(s) Oral every 6 hours PRN Moderate Pain (4 - 6)  melatonin 3 milliGRAM(s) Oral at bedtime PRN Insomnia  ondansetron    Tablet 4 milliGRAM(s) Oral every 6 hours PRN Nausea and/or Vomiting          PHYSICAL EXAM:  Vital Signs Last 24 Hrs  T(C): 36.4 (14 Jan 2025 04:40), Max: 37.2 (13 Jan 2025 12:57)  T(F): 97.5 (14 Jan 2025 04:40), Max: 99 (13 Jan 2025 12:57)  HR: 85 (14 Jan 2025 04:40) (78 - 98)  BP: 138/86 (14 Jan 2025 04:40) (118/70 - 144/89)  BP(mean): --  RR: 18 (14 Jan 2025 04:40) (18 - 18)  SpO2: 97% (14 Jan 2025 04:40) (97% - 100%)    Parameters below as of 14 Jan 2025 04:40  Patient On (Oxygen Delivery Method): room air        CAPILLARY BLOOD GLUCOSE        I&O's Summary      General: Patient appears comfortable, no acute distress  HEENT: NCAT, PERRL, anicteric sclera, mucous membranes moist and intact  Neck: Supple, No lymphadenopathy. Hirsutism present.   CV: +S1/S2, RRR, no M/R/G  Lungs: No respiratory distress, CTA b/l, no wheezing, rales or rhonchi  Abd:  BS4+, Soft, NTND, no guarding, small area of superficial firmness in the skin of the LLQ  : No suprapubic tenderness  Neuro: AAOx3. No focal deficits noted.   Ext: No cyanosis, no edema, R medial proximal thigh, 16cm+ in diameter, Approx 6-7cm diameter region of induration, excoriation with trace blood, no purulent drainage, no induration s/p incision & drainage. Smaller region of erythema approx 8cm in diameter LLQ with small central induration, no fluctuance, eschar vs healing excoriation. R axilla with approx 1cm region of induration with central fluctuance, drained approx 1-2cc of purulent- blood-tinged material expressed, small region of surrounding erythema. Additional punctate region of healing excoriation no right loweral lower leg, proximal 1/3 without drainage or appreciable surrounding erythema. No crepitus.  Msk: freely moving upper and lower extremities  Skin: No rash, no phlebitis    LABS:                        10.1   4.54  )-----------( 338      ( 13 Jan 2025 06:29 )             32.6     01-13    138  |  102  |  12  ----------------------------<  118[H]  4.1   |  22  |  0.65    Ca    8.8      13 Jan 2025 06:29            Urinalysis Basic - ( 13 Jan 2025 06:29 )    Color: x / Appearance: x / SG: x / pH: x  Gluc: 118 mg/dL / Ketone: x  / Bili: x / Urobili: x   Blood: x / Protein: x / Nitrite: x   Leuk Esterase: x / RBC: x / WBC x   Sq Epi: x / Non Sq Epi: x / Bacteria: x        Culture - Abscess with Gram Stain (collected 12 Jan 2025 16:04)  Source: .Abscess  Preliminary Report (13 Jan 2025 21:01):    Moderate Staphylococcus aureus    Culture - Blood (collected 12 Jan 2025 09:30)  Source: .Blood BLOOD  Preliminary Report (13 Jan 2025 14:01):    No growth at 24 hours    Culture - Blood (collected 12 Jan 2025 09:15)  Source: .Blood BLOOD  Preliminary Report (13 Jan 2025 14:01):    No growth at 24 hours    Culture - Abscess with Gram Stain (collected 11 Jan 2025 17:15)  Source: .Abscess  Gram Stain (13 Jan 2025 11:47):    No polymorphonuclear leukocytes seen per low power field    No organisms seen per oil power field  Preliminary Report (13 Jan 2025 11:47):    Few Staphylococcus aureus        IMAGING:    [X] All pertinent imaging reviewed by me

## 2025-01-15 NOTE — DISCHARGE NOTE NURSING/CASE MANAGEMENT/SOCIAL WORK - FINANCIAL ASSISTANCE
[facility] provides services at a reduced cost to those who are determined to be eligible through [facility]’s financial assistance program. Information regarding [facility]’s financial assistance program can be found by going to [email or web address] or by calling [phone].

## 2025-01-15 NOTE — DISCHARGE NOTE NURSING/CASE MANAGEMENT/SOCIAL WORK - NSDCFUADDAPPT_GEN_ALL_CORE_FT
APPTS ARE READY TO BE MADE: [X] YES    Best Family or Patient Contact (if needed):    Additional Information about above appointments (if needed):    1: Primary Care Physician - Within 1 week of discharge  2: Dermatologist - within 1 week of discharge  3: General Surgery Clinic - within 1 week of discharge    Other comments or requests:

## 2025-01-15 NOTE — PROGRESS NOTE ADULT - PROBLEM SELECTOR PLAN 1
-2x weeks of evolving wounds/abscesses  -HR:100 on admission  -MRSA PCR: +  -A1c: 5.8%, HIV: negative  -Lactate: 2.6 -> 1.6 ->1.3  -Pt refused CT scan  -Ultrasound: abscess collection in the right medial thigh measures 4.1 x 1.5 x 4.0 cm.   -Hx of waxing in the affected regions; though does note that some skin lesions started prior to waxing  -Hirsutism noted on physical examination  -s/p I&D of R thigh lesion on 01/12  -Ddx: furuncle/boil vs contact dermatitis with superimposed MRSA bacterial infection vs hidradenitis suppurativa     PLAN:  >c/w vancomycin (1/12-   ), (s/p cefazolin 1/11-1/13 & doxycycline 1/11-1/12)  >f/u BCx: NGTD  >f/u expressed Wound Cx: few staph. aureus  >f/u I&D Wound Cx: moderate staph. aureus   >f/u ID recs
-2x weeks of evolving wounds/abscesses  -HR:100 on admission  -MRSA PCR: +  -A1c: 5.8%, HIV: negative  -Lactate: 2.6 -> 1.6 ->1.3  -Pt refused CT scan  -Ultrasound: abscess collection in the right medial thigh measures 4.1 x 1.5 x 4.0 cm.   -Hx of waxing in the affected regions; though does note that some skin lesions started prior to waxing  -Hirsutism noted on physical examination  -s/p I&D of R thigh lesion on 01/12  -Ddx: furuncle/boil vs contact dermatitis with superimposed MRSA bacterial infection vs hidradenitis suppurativa     PLAN:  >c/w Bactrim DS BID 1/15-  (s/p vancomycin 1/12-1/15 & cefazolin 1/11-1/13 & doxycycline 1/11-1/12)  >f/u BCx: NGTD  >f/u expressed Wound Cx: few MRSA  >f/u I&D Wound Cx: moderate MRSA   >f/u ID recs
-2x weeks of evolving wounds/abscesses  -HR:100 on admission  -MRSA PCR: +  -A1c: 5.8%, HIV: negative  -Lactate: 2.6 -> 1.6 ->1.3  -Pt refused CT scan  -Ultrasound: abscess collection in the right medial thigh measures 4.1 x 1.5 x 4.0 cm.   -Hx of waxing in the affected regions; though does note that some skin lesions started prior to waxing  -Hirsutism noted on physical examination  -s/p I&D of R thigh lesion on 01/12  -Ddx: furuncle/boil vs contact dermatitis with superimposed MRSA bacterial infection vs hidradenitis suppurativa     PLAN:  >c/w vancomycin (1/12- and cefazolin (1/11- (s/p doxycycline)   >f/u BCx: NGTD  >f/u expressed Wound Cx: NGTD, negative gram stain  >f/u I&D Wound Cx: NGTD  >f/u ID recs

## 2025-01-15 NOTE — PROGRESS NOTE ADULT - PROBLEM SELECTOR PLAN 3
-1x episode of diarrhea on 1/11/25  -s/p initiation of abx  - reporting diarrhea as well    PLAN:  >c/w monitoring stools  >GI PCR if loose-stools continue

## 2025-01-15 NOTE — PROGRESS NOTE ADULT - PROBLEM SELECTOR PLAN 5
DVT Ppx: can hold off, IMPROVE score: 0   Diet: DASH/TLC Diet  Dispo: Pending discharge to home after home wound-care services are arranged; medically ready for d/c
DVT Ppx: can hold off, IMPROVE score: 0   Diet: DASH/TLC Diet  Dispo: Active, on IV abx pending wound cx speciation
DVT Ppx: can hold off, IMPROVE score: 0   Diet: DASH/TLC Diet  Dispo: Active, on IV abx pending wound cx speciation

## 2025-01-16 PROBLEM — N92.0 EXCESSIVE AND FREQUENT MENSTRUATION WITH REGULAR CYCLE: Chronic | Status: ACTIVE | Noted: 2025-01-12

## 2025-01-17 LAB
CULTURE RESULTS: ABNORMAL
CULTURE RESULTS: ABNORMAL
CULTURE RESULTS: SIGNIFICANT CHANGE UP
CULTURE RESULTS: SIGNIFICANT CHANGE UP
ORGANISM # SPEC MICROSCOPIC CNT: ABNORMAL
SPECIMEN SOURCE: SIGNIFICANT CHANGE UP

## 2025-01-21 ENCOUNTER — APPOINTMENT (OUTPATIENT)
Dept: DERMATOLOGY | Facility: CLINIC | Age: 50
End: 2025-01-21
Payer: MEDICAID

## 2025-01-21 VITALS — BODY MASS INDEX: 29.98 KG/M2 | WEIGHT: 191 LBS | HEIGHT: 67 IN

## 2025-01-21 PROBLEM — L02.92 FURUNCULOSIS: Status: ACTIVE | Noted: 2025-01-21

## 2025-01-21 PROCEDURE — 99204 OFFICE O/P NEW MOD 45 MIN: CPT

## 2025-01-21 RX ORDER — CICLOPIROX 71.3 MG/ML
8 SOLUTION TOPICAL
Qty: 1 | Refills: 5 | Status: ACTIVE | COMMUNITY
Start: 2025-01-21 | End: 1900-01-01

## 2025-01-21 RX ORDER — CLINDAMYCIN PHOSPHATE 10 MG/ML
1 LOTION TOPICAL
Qty: 1 | Refills: 2 | Status: ACTIVE | COMMUNITY
Start: 2025-01-21 | End: 1900-01-01

## 2025-01-21 RX ORDER — CHLORHEXIDINE GLUCONATE 40 MG/ML
4 SOLUTION TOPICAL
Qty: 1 | Refills: 2 | Status: ACTIVE | COMMUNITY
Start: 2025-01-21 | End: 1900-01-01

## 2025-01-21 RX ORDER — MUPIROCIN 20 MG/G
2 OINTMENT TOPICAL
Qty: 1 | Refills: 1 | Status: ACTIVE | COMMUNITY
Start: 2025-01-21 | End: 1900-01-01

## 2025-01-21 NOTE — PHYSICAL EXAM
[FreeTextEntry3] :  Gen:                        Well appearing, well nourished, NAD. Skin:                       Eccrine:                 Within normal limits                Face:                                 Abdomen:                             UE:                         LE:                          Groin:                          Neuro:                     No focal deficits. Age appropriate. Psych:                     Appropriate affect.

## 2025-01-21 NOTE — ASSESSMENT
[FreeTextEntry1] : -- Hospital referral - pt recently admitted for furuncles vs impetinginized contact dermatitis vs HS Had I+D of thigh with gen surg tx with doxy and cefazolin, then vanc with improvement. d/c on PO bactrim today no active lesions I+D site on R thigh is clean. healing well - still open erosion  #Folliculitis vs early HS (lacks clear HS stigmata at this time) -finish bactrim course - 1 more day -start hibicens wash at home -clinda lotion -mupiocin ointment to R thigh BID til healed  #nail dystrophy -ciclopirox solution

## 2025-01-21 NOTE — HISTORY OF PRESENT ILLNESS
[FreeTextEntry1] : Rash on skin [de-identified] : Hospital referral - pt recently admitted for furuncles vs impetinginized contact dermatitis vs HS Had I+D of thigh with gen surg tx with doxy and cefazolin, then vanc with improvement. d/c on PO bactrim  today no active lesions I+D site on R thigh is clean. healing well - still open erosion

## 2025-01-28 ENCOUNTER — APPOINTMENT (OUTPATIENT)
Dept: INTERNAL MEDICINE | Facility: CLINIC | Age: 50
End: 2025-01-28
Payer: MEDICAID

## 2025-01-28 VITALS
HEART RATE: 85 BPM | HEIGHT: 67 IN | TEMPERATURE: 98.4 F | DIASTOLIC BLOOD PRESSURE: 79 MMHG | OXYGEN SATURATION: 99 % | SYSTOLIC BLOOD PRESSURE: 131 MMHG | BODY MASS INDEX: 29.82 KG/M2 | WEIGHT: 190 LBS | RESPIRATION RATE: 16 BRPM

## 2025-01-28 DIAGNOSIS — I10 ESSENTIAL (PRIMARY) HYPERTENSION: ICD-10-CM

## 2025-01-28 DIAGNOSIS — K64.9 UNSPECIFIED HEMORRHOIDS: ICD-10-CM

## 2025-01-28 DIAGNOSIS — Z86.19 PERSONAL HISTORY OF OTHER INFECTIOUS AND PARASITIC DISEASES: ICD-10-CM

## 2025-01-28 DIAGNOSIS — E78.5 HYPERLIPIDEMIA, UNSPECIFIED: ICD-10-CM

## 2025-01-28 DIAGNOSIS — L02.92 FURUNCLE, UNSPECIFIED: ICD-10-CM

## 2025-01-28 DIAGNOSIS — B35.1 TINEA UNGUIUM: ICD-10-CM

## 2025-01-28 PROCEDURE — 99495 TRANSJ CARE MGMT MOD F2F 14D: CPT

## 2025-01-28 RX ORDER — HYDROCORTISONE 25 MG/G
2.5 CREAM TOPICAL
Qty: 1 | Refills: 5 | Status: ACTIVE | COMMUNITY
Start: 2025-01-28 | End: 1900-01-01

## 2025-01-28 NOTE — ASSESSMENT
[FreeTextEntry1] : 49 year old female found to have stable Hypertension, Hyperlipidemia, Iron Deficiency Anemia, Vitamin D Deficiency, Elevated Hemoglobin A1c, Chronic Back Pain, Renal Stone, with the current prescription regimen as recommended, diet and lifestyle modifications, as counseled. Prior results reviewed, interpreted and discussed with the patient during today's examination, as appropriate. Follow up, treatment plan and tests, as ordered.   Patient was recently hospitalized, findings of Furunculosis and recommendations reviewed and discussed with the patient during today's examination.  Supplemental history was obtained from , who is accompanying the patient for today's examination.

## 2025-01-28 NOTE — ADDENDUM
[FreeTextEntry1] : GALINDO ROMERO Female [birth year] 49 Yrs                                          	 Discharge Note Provider    	Shrink    	Status	Complete: Signed		Document Date	01- 09:12    	Facility	[facility]		Encounter Date	01- 11:27    	Clinician	Dany Montes		Last Update Date	01- 09:12    	Doc Type   	[id number]		Finalized Date	01- 13:05    	 Wrap Text: Off   Hospital Course: Discharge Date 15-Ayo-2025 Admission Date 12-Jan-2025 11:27 Reason for Admission abscess Hospital Course HPI: HPI: 50 y/o F with PMHx of HTN presenting with abscesses that have started approximately 2 weeks prior, one on the right thigh, one on the LLQ, one in the right arm pit, and right shin. 2 weeks ago, she began noticing nodules that started in her right axilla and right and left neck. The right and left neck nodules have since disappeared, but the right axillary skin lesion has persisted. Patient is unsure of when in the last two weeks that her left abdominal skin lesion has started. She then waxed the right/left thighs and the suprapubic region (did not wax sides of the abdomen or axillary regions) on Saturday (1/4/25), at which point the right thigh lesion appeared on Monday (1/6/25). Since then, the right thigh and LLQ are the largest and most painful and have been draining bloody fluid, the right thigh lesion also been increasing in warmth, tenderness, and erythema. She has never had an abscess or lump before ever. She denies trauma to the area/injections/trauma/shaving, but does admit to waxing of the areas with strips from local salon. She waxed 3 months prior as well. She waxed all of the areas that are affected but the areas that bother her the most are the RLE on the thigh and the L abdomen. Pt states ambulating has been challenging due to pain from R thigh wound. Denies recent travel, from [place] but hasn't been there since July, denies pets at home. Endorses chills, but denies fevers, chest pain, sob, abd pain, n/v/d/c, dysuria.   ER Course: -VS stable on admission, afebrile, but 1x episode of HR to 100. Initial labs showed WBC 9.46, H/H 10.8/33.6, lactic acid 2.1 -> 1.6 -> 1.3. HIV negative, A1c 5.8%. MRSA PCR: +. -Patient was started initially on cefazolin and doxycycline. Wounds were re-examined the next day with extension of right thigh erythema, though noticeably less erythematous and warm. Patient under went R axillary expression which drained 1-2 cc blood tinged purulence which was sent for culture, gram stain doesn't show any organisms though still pending organisms. -Per ID recommendations, the patient was discontinued on doxycycline, started on vancomycin, and continued on cefazolin, s/p 1LNS -Endorses diarrhea today, notices improvement in her erythema of R thigh skin lesion (12 Jan 2025 13:55)  Hospital Course: The patient was admitted for likely furuncle/boil vs contact dermatitis with superimposed MRSA bacterial infection vs hidradenitis suppurativa. While in the ER, the patient's axillary lesion was expressed and sent for culture. General surgery team was consulted and an I&D was performed for the patient's right medial thigh abscess (confirmed on U/S), which was sent for culture as well. The patient was initially started on doxycycline and cefazolin, but this was discontinued in favor of vancomycin in accordance with the infectious disease team's recommendations as the wound cultures grew staph. aureus. The vancomycin dosing was adjusted during the patient's hospital visit based on vancomycin trough levels. The patient remained afebrile, normotensive, with a normal HR, and her WBC count downtrended. On the day of discharge, the patient was medically stable and ready for discharge. The wound cultures resulted as MRSA+. The patient was discharged with the following antibiotic regimen, per the infectious disease team's recommendations: Bactrim 1 double-strength tablet 3x a day until 1/22   Important Medication Changes and Reason: Antibiotic regimen: Bactrim 1 double-strength tablet three times per day until 1/22  Active or Pending Issues Requiring Follow-up: Primary Care Follow-Up - within 1 week of discharge Dermatology - within 1 week of discharge General Surgery Clinic - within 1 week of discharge for wound check  Advanced Directives: [X] Full code [ ] DNR [ ] Hospice  Discharge Diagnoses: Furunculosis Abscess due to staph. aureus   Med Reconciliation: Override IMPROVE-DD recommendations due to: IMPROVE-DD Application Not Available Recommended Post-Discharge VTE Prophylaxis IMPROVE-DD Application Not Available Medication Reconciliation Status Admission Reconciliation is Completed Discharge Reconciliation is Completed Discharge Medications ferrous fumarate 325 mg (106 mg elemental iron) oral tablet: 1 tab(s) orally once a day Norvasc 5 mg oral tablet: 1 tab(s) orally once a day sulfamethoxazole-trimethoprim 800 mg-160 mg oral tablet: 1 tab(s) orally 3 times a day Take one tablet, every 8 hours (three times daily), until 1/22 tranexamic acid 650 mg oral tablet: 2 tab(s) orally 3 times a day , , Care Plan/Procedures: Discharge Diagnoses, Assessment and Plan of Treatment PRINCIPAL DISCHARGE DIAGNOSIS Diagnosis: Abscess of multiple sites Assessment and Plan of Treatment: You came in with multiple skin wounds - one in your armpit area, one on the left side of your abdomen, on your right shin, and a larger one on your right thigh. You also had chills, and your heart rate was elevated. Pus was collected from the armpit wound, and an incision & drainage was performed on your right thigh abscess. Based on the results of the pus that was collected and the bacteria that grew, you were started on an appropriate antibiotic medication in consultation with the infectious disease physicians. Your skin wounds were likely a combination of an abscess and furunculosis. Please see your primary care physician within 1 week of discharge (you should also re-initiate amlodipine or an alternative blood pressure medication with your primary care doctor). Please see a dermatologist (referral provided) within 1 week of discharge. Please visit the general surgery clinic within 1 week of discharge for follow-up of your incision & drainage. If you develop worsening skin wounds, fevers, chills, nausea, vomiting, or feel ill - then please return urgently to the hospital for further evaluation and treatment. Please continue to take the antibiotic trimethoprim-sulfamethoxazole until 1/22, three times daily, according to recommendations from the infectious disease physicians. WE have confirmed it will be three times daily on discharge and not twice daily. Discharge Procedures, Findings and Treatment PRINCIPAL PROCEDURE Procedure: Simple incision and drainage of skin abscess Findings and Treatment: Incision and drainage of right medial thigh skin abscess which grew staph. aureus.   SECONDARY PROCEDURE Procedure: Wound ultrasound Findings and Treatment: ACC: [id number] EXAM: US NONVASC EXT LTD RT ORDERED BY: LEXUS STOCKTON PROCEDURE DATE: 01/12/2025 INTERPRETATION: CLINICAL INFORMATION: Right thigh collection COMPARISON: None available. TECHNIQUE: Grayscale ultrasoundand color Doppler were performed in the area of interest. FINDINGS: A collection in the right medial thigh measures 4.1 x 1.5 x 4.0 cm. There is associated hyperemia and edema. There is increased echogenicity involving the adjacent fat planes. There is no internal vascularity. IMPRESSION: Findings consistent with abscess in the area of interest measuring up to 4.1 cm Goal(s) To get better and follow your care plan as instructed. Follow Up: Care Providers for Follow up (PCP/Outpatient Provider) [facility] [address], [address] [place] [address] Phone: [phone] Fax: [phone] Established Patient Follow Up Time: 1 week Follow-up Clinics [facility] [address] [place] [address], [facility] [address] [place] [address] Phone: [phone] Fax: Follow Up Time: 1 week  [facility] General Surgery [address] [place] [id number] Phone: [phone] Fax: Follow Up Time: 1 week Additional Provider Info (For SysAdmin Use Only) PROVIDER:[TOKEN:[[id number]],FOLLOWUP:[1 week],ESTABLISHEDPATIENT:[T]] Care Providers Direct Addresses (For SYSAdmin Use Only) ,[email or web address] Follow-up Clinics (For SysAdmin Use Only) [id number]:1 week|| ||00\01||False;[id number]:1 week|| ||00\01||False; NPI number (For SysAdmin Use Only) : [[id number]] Patient's Scheduled Appointments [facility] [address] Scheduled Appointment: 01/21/2025  [facility] [address] Scheduled Appointment: 01/21/2025  Daysi Odell [facility] [phone] Scheduled Appointment: 02/28/2025 Additional Scheduled Appointments APPTS ARE READY TO BE MADE: [X] YES  Best Family or Patient Contact (if needed):  Additional Information about above appointments (if needed):  1: Primary Care Physician - Within 1 week of discharge 2: Dermatologist - within 1 week of discharge 3: General Surgery Clinic - within 1 week of discharge  Other comments or requests:   Dermatology: Prior to outreaching the patient, it was visible that the patient has secured a follow up appointment which was not scheduled by our team. Dr KIZZY PIÑA,[facility] 1/21/2025 9:45 AM  Internal Medicine: Prior to outreaching the patient, it was visible that the patient has secured a follow up appointment which was not scheduled by our team. Lev Bermudez 1/21/2025 4:40 PM  Surgery: Patient advised they did not want to proceed with scheduling appointments with the providers on this referral. They will coordinate care on their own. Pt will call if Dermatologist feels she need this appointment.  Discharge Diet DASH Diet, Low Sodium Diet Activity Activity as tolerated, Bathing allowed Quality Measures: Does the patient have difficulty running errands alone like visiting a doctors office or shopping? No Does the patient have difficulty climbing stairs? No Cognition: The patient has No difficulties Patient Condition Stable Hospice Patient No Core Measure Site No Does the patient have a principal diagnosis of ischemic stroke, hemorrhagic stroke, or TIA? No Does the patient have a principal diagnosis of Acute Myocardial Infarction? No Has the patient had a Percutaneous Coronary Intervention? No Home Health: Discharged with Home Health Care Services? Yes Face-To-Face Contact As certified below, I, or a nurse practitioner or physician assistant working with me, had a face-to-face encounter that meets the physician face-to-face encounter requirements. Need for Skilled Services Wound care and assessment Based on the above findings, the following intermittent skilled services are medically necessary home health services: Nursing Home Bound Status Other, specify... Other Home Bound Status Not home-bound Patient Needs Assistance to Leave Residence... Other Patient does not need assistance to leave residence Attending Certification My signature below certifies that the above stated patient is homebound and upon completion of the Face-To-Face encounter, has the need for intermittent skilled nursing, physical therapy and/or speech or occupational therapy services in their home for their current diagnosis as outlined in their initial plan of care. These services will continue to be monitored by myself or another physician. Encounter Date 14-Jan-2025 Document Complete: Care Provider Seen in [facility] TEAM 1 Physician Section Complete This document is complete and the patient is ready for discharge. For questions about your prescriptions, please call: [phone] Is this contact telephone number correct? Yes Attending Attestation Statement I have personally seen and examined the patient. I have collaborated with and supervised the . on the discharge service for the patient. I have reviewed and made amendments to the documentation where necessary. Time Spent: I personally spent ? 39 ? minutes on the discharge service.   Electronic Signatures: Jada Arreola (Access Services) (Signed 17-Jan-2025 09:36) Authored: Follow Up Ernesto Aldana) (Signed 15-Ayo-2025 12:24) Authored: Hospital Course, Med Reconciliation, Care Plan/Procedures Cara Worthy () (Signed 14-Jan-2025 13:05) Authored: Document Complete Co-Signer: Hospital Course, Med Reconciliation, Care Plan/Procedures, Follow Up, Quality Measures, Home Health, Covid Information, Document Complete Dany Montes) (Signed 15-Ayo-2025 12:49) Authored: Hospital Course, Med Reconciliation, Care Plan/Procedures, Follow Up, Quality Measures, Home Health, Document Complete   Last Updated: 17-Jan-2025 09:36 by Jada Arreola (Access Services)

## 2025-01-28 NOTE — HEALTH RISK ASSESSMENT
[Intercurrent hospitalizations] : was admitted to the hospital  [No] : In the past 12 months have you used drugs other than those required for medical reasons? No [No falls in past year] : Patient reported no falls in the past year [0] : 2) Feeling down, depressed, or hopeless: Not at all (0) [Never] : Never [de-identified] : As per addendum section. [de-identified] : DERM [[id number]] : 0

## 2025-01-28 NOTE — HISTORY OF PRESENT ILLNESS
[Post-hospitalization from ___ Hospital] : Post-hospitalization from [unfilled] Hospital [Admitted on: ___] : The patient was admitted on [unfilled] [Discharged on ___] : discharged on [unfilled] [Discharge Summary] : discharge summary [FreeTextEntry2] : As per addendum section.  49 year old  female patient with history of stable Hypertension, Hyperlipidemia, Iron Deficiency Anemia, Vitamin D Deficiency, Elevated Hemoglobin A1c, Chronic Back Pain, Renal Stone, history as stated, presented for follow up examination after hospital discharge. Patient is compliant with all medications. ROS as stated.

## 2025-01-28 NOTE — REVIEW OF SYSTEMS
[de-identified] : Right thigh wound is healing well. [TextEntry] : CARDIOVASCULAR: Negative RESPIRATORY: Negative GASTROINTESTINAL: Negative NEUROLOGICAL: Negative

## 2025-02-25 ENCOUNTER — APPOINTMENT (OUTPATIENT)
Dept: DERMATOLOGY | Facility: CLINIC | Age: 50
End: 2025-02-25
Payer: MEDICAID

## 2025-02-25 VITALS — HEIGHT: 67 IN | BODY MASS INDEX: 29.82 KG/M2 | WEIGHT: 190 LBS

## 2025-02-25 DIAGNOSIS — L73.9 FOLLICULAR DISORDER, UNSPECIFIED: ICD-10-CM

## 2025-02-25 DIAGNOSIS — L81.0 POSTINFLAMMATORY HYPERPIGMENTATION: ICD-10-CM

## 2025-02-25 PROCEDURE — 99214 OFFICE O/P EST MOD 30 MIN: CPT

## 2025-02-25 RX ORDER — HYDROCORTISONE 1 %
12 CREAM (GRAM) TOPICAL
Qty: 1 | Refills: 1 | Status: ACTIVE | COMMUNITY
Start: 2025-02-25 | End: 1900-01-01

## 2025-02-25 RX ORDER — DOXYCYCLINE HYCLATE 50 MG/1
50 CAPSULE ORAL
Qty: 60 | Refills: 1 | Status: ACTIVE | COMMUNITY
Start: 2025-02-25 | End: 1900-01-01

## 2025-02-25 NOTE — ASSESSMENT
[FreeTextEntry1] : -- folliculitis vs early HS Had I+D of thigh with gen surg tx with doxy and cefazolin, then vanc with improvement. d/c on PO bactrim started on hibiclens + clinda lotion daily tx + mupirocin to I+D site R thigh done by gen surg R thigh doing better but bothered by PIPA. new lesion L thigh  #Folliculitis vs early HS (lacks clear HS stigmata at this time) -s/p bactrim -cont hibi/clinda at home -option ILK for new lesion L thigh disc - declines d/t needle aversion -start doxy 50mg BID with meals  #PIPA -R thigh I+D site as well as site on L abd -dx, nature, mgmt options PIPA disc. difficult nature of tx and tincture of time disc. elective cos options disc, defer at this time -trial amlactin  -sun protection advised

## 2025-02-25 NOTE — HISTORY OF PRESENT ILLNESS
[FreeTextEntry1] : Rash on skin [de-identified] : folliculitis vs early HS Had I+D of thigh with gen surg tx with doxy and cefazolin, then vanc with improvement. d/c on PO bactrim started on hibiclens + clinda lotion daily tx + mupirocin to I+D site R thigh done by gen surg R thigh doing better but bothered by PIPA. new lesion L thigh

## 2025-02-28 ENCOUNTER — APPOINTMENT (OUTPATIENT)
Dept: OBGYN | Facility: HOSPITAL | Age: 50
End: 2025-02-28

## 2025-04-09 NOTE — CONSULT NOTE ADULT - ASSESSMENT
[Vaccines Reviewed] : Immunizations reviewed today. Please see immunization details in the vaccine log within the immunization flowsheet.  49 F PMH HTN, Kidney stones with hx of lithotripsy, anemia, presents with areas c/f abscesses. s/p R thigh abscess I&D by general surgery.     Recommendations  - s/p R thigh abscess I&D by ACS  - pain control PRN  - recommend ID recs  - wound culture sent from I&D  - appreciate care per primary team    Patient seen and examined with and plan discussed with attending Dr. [place]    Trauma/ACS  [phone]  [FreeTextEntry1] : This is a 70-year-old male whose history has been reviewed above  He has a history of hypertension his blood pressure had been under good control he is taking meloxicam which has resulted in some mild increase his pressures.  He remains on losartan and amlodipine at this point we will make no changes as the meloxicam will be transient  He has a history of hypercholesterolemia remains on atorvastatin a cholesterol profile has been obtained medication changes predicated on the results.  It is of interest that his blood pressures at home are often high which probably puts him into the category of mast hypertension which we will be attentive to.  His blood pressure is maintained on losartan appropriate blood tests were drawn.  He did have an episode of chest pain on exertion a stress test in 2022 was negative for ischemia and no reversible defects  He is complaining and has been told he has right hip pain but the pain is in the distribution of L4-L5 we will have this reevaluated as I am not confident of the diagnosis his wife is a nurse also agrees.  I did review his x-ray report which is moderately suggestive of hip disease  He does have a small mass on his chest which is soft and mobile and I am sure it is a lipoma but for safety sake we will get an ultrasound  A PSA was obtained  He is up-to-date with colonoscopy COVID flu Prevnar and shingles he does exercise he might want to lose a little bit of weight but he does eat a heart healthy diet

## 2025-04-30 ENCOUNTER — RX RENEWAL (OUTPATIENT)
Age: 50
End: 2025-04-30

## 2025-05-22 ENCOUNTER — APPOINTMENT (OUTPATIENT)
Dept: DERMATOLOGY | Facility: CLINIC | Age: 50
End: 2025-05-22

## 2025-05-26 ENCOUNTER — NON-APPOINTMENT (OUTPATIENT)
Age: 50
End: 2025-05-26

## 2025-05-27 ENCOUNTER — APPOINTMENT (OUTPATIENT)
Dept: INTERNAL MEDICINE | Facility: CLINIC | Age: 50
End: 2025-05-27
Payer: MEDICAID

## 2025-05-27 ENCOUNTER — NON-APPOINTMENT (OUTPATIENT)
Age: 50
End: 2025-05-27

## 2025-05-27 VITALS
SYSTOLIC BLOOD PRESSURE: 157 MMHG | WEIGHT: 187 LBS | OXYGEN SATURATION: 98 % | HEIGHT: 67 IN | DIASTOLIC BLOOD PRESSURE: 90 MMHG | BODY MASS INDEX: 29.35 KG/M2 | HEART RATE: 79 BPM | RESPIRATION RATE: 16 BRPM | TEMPERATURE: 98.1 F

## 2025-05-27 DIAGNOSIS — R73.09 OTHER ABNORMAL GLUCOSE: ICD-10-CM

## 2025-05-27 DIAGNOSIS — D50.9 IRON DEFICIENCY ANEMIA, UNSPECIFIED: ICD-10-CM

## 2025-05-27 DIAGNOSIS — I10 ESSENTIAL (PRIMARY) HYPERTENSION: ICD-10-CM

## 2025-05-27 DIAGNOSIS — E78.5 HYPERLIPIDEMIA, UNSPECIFIED: ICD-10-CM

## 2025-05-27 PROCEDURE — 93000 ELECTROCARDIOGRAM COMPLETE: CPT

## 2025-05-27 PROCEDURE — 99213 OFFICE O/P EST LOW 20 MIN: CPT | Mod: 25

## 2025-05-27 NOTE — HISTORY OF PRESENT ILLNESS
[de-identified] : 50 year old  female patient with history of stable Hypertension, Hyperlipidemia, Iron Deficiency Anemia, Vitamin D Deficiency, Elevated Hemoglobin A1c, Chronic Back Pain, Renal Stone, history as stated, presented for follow up examination. Patient is compliant with all medications. ROS as stated.

## 2025-05-27 NOTE — HISTORY OF PRESENT ILLNESS
[de-identified] : 50 year old  female patient with history of stable Hypertension, Hyperlipidemia, Iron Deficiency Anemia, Vitamin D Deficiency, Elevated Hemoglobin A1c, Chronic Back Pain, Renal Stone, history as stated, presented for follow up examination. Patient is compliant with all medications. ROS as stated.

## 2025-05-27 NOTE — HEALTH RISK ASSESSMENT
[No] : In the past 12 months have you used drugs other than those required for medical reasons? No [No falls in past year] : Patient reported no falls in the past year [0] : 2) Feeling down, depressed, or hopeless: Not at all (0) [Never] : Never [de-identified] : GYN/DERM [[id number]] : 0

## 2025-05-27 NOTE — ASSESSMENT
[FreeTextEntry1] : 49 year old female found to have stable Hypertension (noncompliant with Amlodipine, counseled to be compliant), Hyperlipidemia, Iron Deficiency Anemia, Vitamin D Deficiency, Elevated Hemoglobin A1c, Chronic Back Pain, Renal Stone, with the current prescription regimen as recommended, diet and lifestyle modifications, as counseled. Prior results reviewed, interpreted and discussed with the patient during today's examination, as appropriate. Follow up, treatment plan and tests, as ordered.  EKG: Sinus Rhythm @ 65 BPM. No new acute ST-T changes noted.    Total time spent:: 25 minutes Including: Preparation prior to visit - Reviewing prior record, results of tests and Consultation Reports as applicable Conducting an appropriate H & P during today's encounter Appropriate orders for tests, medications and procedures, as applicable Counseling patient Note completion

## 2025-05-27 NOTE — HEALTH RISK ASSESSMENT
[No] : In the past 12 months have you used drugs other than those required for medical reasons? No [No falls in past year] : Patient reported no falls in the past year [0] : 2) Feeling down, depressed, or hopeless: Not at all (0) [Never] : Never [de-identified] : GYN/DERM [[id number]] : 0

## 2025-05-30 LAB
ALBUMIN SERPL ELPH-MCNC: 4.5 G/DL
ALP BLD-CCNC: 104 U/L
ALT SERPL-CCNC: 33 U/L
ANION GAP SERPL CALC-SCNC: 18 MMOL/L
AST SERPL-CCNC: 31 U/L
BASOPHILS # BLD AUTO: 0.03 K/UL
BASOPHILS NFR BLD AUTO: 0.6 %
BILIRUB SERPL-MCNC: 0.3 MG/DL
BUN SERPL-MCNC: 13 MG/DL
CALCIUM SERPL-MCNC: 9.8 MG/DL
CHLORIDE SERPL-SCNC: 105 MMOL/L
CHOLEST SERPL-MCNC: 192 MG/DL
CO2 SERPL-SCNC: 20 MMOL/L
CREAT SERPL-MCNC: 0.61 MG/DL
EGFRCR SERPLBLD CKD-EPI 2021: 108 ML/MIN/1.73M2
EOSINOPHIL # BLD AUTO: 0.07 K/UL
EOSINOPHIL NFR BLD AUTO: 1.3 %
ESTIMATED AVERAGE GLUCOSE: 126 MG/DL
GGT SERPL-CCNC: 18 U/L
GLUCOSE SERPL-MCNC: 90 MG/DL
HBA1C MFR BLD HPLC: 6 %
HCT VFR BLD CALC: 42.2 %
HDLC SERPL-MCNC: 52 MG/DL
HGB BLD-MCNC: 13 G/DL
IMM GRANULOCYTES NFR BLD AUTO: 0.2 %
IRON SATN MFR SERPL: 12 %
IRON SERPL-MCNC: 56 UG/DL
LDLC SERPL-MCNC: 121 MG/DL
LYMPHOCYTES # BLD AUTO: 1.43 K/UL
LYMPHOCYTES NFR BLD AUTO: 26.8 %
MAN DIFF?: NORMAL
MCHC RBC-ENTMCNC: 26.4 PG
MCHC RBC-ENTMCNC: 30.8 G/DL
MCV RBC AUTO: 85.8 FL
MONOCYTES # BLD AUTO: 0.39 K/UL
MONOCYTES NFR BLD AUTO: 7.3 %
NEUTROPHILS # BLD AUTO: 3.41 K/UL
NEUTROPHILS NFR BLD AUTO: 63.8 %
NONHDLC SERPL-MCNC: 140 MG/DL
PLATELET # BLD AUTO: 282 K/UL
POTASSIUM SERPL-SCNC: 4.6 MMOL/L
PROT SERPL-MCNC: 7.4 G/DL
RBC # BLD: 4.92 M/UL
RBC # FLD: 17.3 %
SODIUM SERPL-SCNC: 142 MMOL/L
TIBC SERPL-MCNC: 468 UG/DL
TRIGL SERPL-MCNC: 100 MG/DL
UIBC SERPL-MCNC: 412 UG/DL
WBC # FLD AUTO: 5.34 K/UL

## 2025-06-09 RX ORDER — DOXYCYCLINE HYCLATE 50 MG/1
50 CAPSULE ORAL
Qty: 40 | Refills: 0 | Status: ACTIVE | COMMUNITY
Start: 2025-06-09 | End: 1900-01-01

## 2025-06-13 ENCOUNTER — EMERGENCY (EMERGENCY)
Facility: HOSPITAL | Age: 50
LOS: 1 days | End: 2025-06-13
Attending: EMERGENCY MEDICINE
Payer: MEDICAID

## 2025-06-13 VITALS
DIASTOLIC BLOOD PRESSURE: 99 MMHG | TEMPERATURE: 99 F | OXYGEN SATURATION: 96 % | HEIGHT: 66 IN | WEIGHT: 192.02 LBS | RESPIRATION RATE: 20 BRPM | HEART RATE: 104 BPM | SYSTOLIC BLOOD PRESSURE: 183 MMHG

## 2025-06-13 VITALS
TEMPERATURE: 98 F | HEART RATE: 68 BPM | RESPIRATION RATE: 16 BRPM | OXYGEN SATURATION: 97 % | SYSTOLIC BLOOD PRESSURE: 135 MMHG | DIASTOLIC BLOOD PRESSURE: 78 MMHG

## 2025-06-13 DIAGNOSIS — Z98.891 HISTORY OF UTERINE SCAR FROM PREVIOUS SURGERY: Chronic | ICD-10-CM

## 2025-06-13 DIAGNOSIS — Z98.890 OTHER SPECIFIED POSTPROCEDURAL STATES: Chronic | ICD-10-CM

## 2025-06-13 LAB
ALBUMIN SERPL ELPH-MCNC: 4.6 G/DL — SIGNIFICANT CHANGE UP (ref 3.3–5)
ALP SERPL-CCNC: 97 U/L — SIGNIFICANT CHANGE UP (ref 40–120)
ALT FLD-CCNC: 22 U/L — SIGNIFICANT CHANGE UP (ref 10–45)
ANION GAP SERPL CALC-SCNC: 15 MMOL/L — SIGNIFICANT CHANGE UP (ref 5–17)
APPEARANCE UR: CLEAR — SIGNIFICANT CHANGE UP
AST SERPL-CCNC: 20 U/L — SIGNIFICANT CHANGE UP (ref 10–40)
BASOPHILS # BLD AUTO: 0.02 K/UL — SIGNIFICANT CHANGE UP (ref 0–0.2)
BASOPHILS NFR BLD AUTO: 0.2 % — SIGNIFICANT CHANGE UP (ref 0–2)
BILIRUB SERPL-MCNC: 0.3 MG/DL — SIGNIFICANT CHANGE UP (ref 0.2–1.2)
BILIRUB UR-MCNC: NEGATIVE — SIGNIFICANT CHANGE UP
BUN SERPL-MCNC: 10 MG/DL — SIGNIFICANT CHANGE UP (ref 7–23)
CALCIUM SERPL-MCNC: 9.7 MG/DL — SIGNIFICANT CHANGE UP (ref 8.4–10.5)
CHLORIDE SERPL-SCNC: 105 MMOL/L — SIGNIFICANT CHANGE UP (ref 96–108)
CO2 SERPL-SCNC: 23 MMOL/L — SIGNIFICANT CHANGE UP (ref 22–31)
COLOR SPEC: YELLOW — SIGNIFICANT CHANGE UP
CREAT SERPL-MCNC: 0.58 MG/DL — SIGNIFICANT CHANGE UP (ref 0.5–1.3)
DIFF PNL FLD: NEGATIVE — SIGNIFICANT CHANGE UP
EGFR: 109 ML/MIN/1.73M2 — SIGNIFICANT CHANGE UP
EGFR: 109 ML/MIN/1.73M2 — SIGNIFICANT CHANGE UP
EOSINOPHIL # BLD AUTO: 0 K/UL — SIGNIFICANT CHANGE UP (ref 0–0.5)
EOSINOPHIL NFR BLD AUTO: 0 % — SIGNIFICANT CHANGE UP (ref 0–6)
FLUAV AG NPH QL: SIGNIFICANT CHANGE UP
FLUBV AG NPH QL: SIGNIFICANT CHANGE UP
GAS PNL BLDV: SIGNIFICANT CHANGE UP
GLUCOSE SERPL-MCNC: 87 MG/DL — SIGNIFICANT CHANGE UP (ref 70–99)
GLUCOSE UR QL: NEGATIVE MG/DL — SIGNIFICANT CHANGE UP
HCG SERPL-ACNC: <2 MIU/ML — SIGNIFICANT CHANGE UP
HCT VFR BLD CALC: 40.1 % — SIGNIFICANT CHANGE UP (ref 34.5–45)
HGB BLD-MCNC: 12.5 G/DL — SIGNIFICANT CHANGE UP (ref 11.5–15.5)
IMM GRANULOCYTES NFR BLD AUTO: 0.4 % — SIGNIFICANT CHANGE UP (ref 0–0.9)
KETONES UR QL: NEGATIVE MG/DL — SIGNIFICANT CHANGE UP
LEUKOCYTE ESTERASE UR-ACNC: NEGATIVE — SIGNIFICANT CHANGE UP
LYMPHOCYTES # BLD AUTO: 1.22 K/UL — SIGNIFICANT CHANGE UP (ref 1–3.3)
LYMPHOCYTES # BLD AUTO: 15.2 % — SIGNIFICANT CHANGE UP (ref 13–44)
MAGNESIUM SERPL-MCNC: 2 MG/DL — SIGNIFICANT CHANGE UP (ref 1.6–2.6)
MCHC RBC-ENTMCNC: 26.5 PG — LOW (ref 27–34)
MCHC RBC-ENTMCNC: 31.2 G/DL — LOW (ref 32–36)
MCV RBC AUTO: 85.1 FL — SIGNIFICANT CHANGE UP (ref 80–100)
MONOCYTES # BLD AUTO: 0.32 K/UL — SIGNIFICANT CHANGE UP (ref 0–0.9)
MONOCYTES NFR BLD AUTO: 4 % — SIGNIFICANT CHANGE UP (ref 2–14)
NEUTROPHILS # BLD AUTO: 6.45 K/UL — SIGNIFICANT CHANGE UP (ref 1.8–7.4)
NEUTROPHILS NFR BLD AUTO: 80.2 % — HIGH (ref 43–77)
NITRITE UR-MCNC: NEGATIVE — SIGNIFICANT CHANGE UP
NRBC BLD AUTO-RTO: 0 /100 WBCS — SIGNIFICANT CHANGE UP (ref 0–0)
PH UR: 6.5 — SIGNIFICANT CHANGE UP (ref 5–8)
PHOSPHATE SERPL-MCNC: 3 MG/DL — SIGNIFICANT CHANGE UP (ref 2.5–4.5)
PLATELET # BLD AUTO: 284 K/UL — SIGNIFICANT CHANGE UP (ref 150–400)
POTASSIUM SERPL-MCNC: 4.1 MMOL/L — SIGNIFICANT CHANGE UP (ref 3.5–5.3)
POTASSIUM SERPL-SCNC: 4.1 MMOL/L — SIGNIFICANT CHANGE UP (ref 3.5–5.3)
PROT SERPL-MCNC: 7.5 G/DL — SIGNIFICANT CHANGE UP (ref 6–8.3)
PROT UR-MCNC: NEGATIVE MG/DL — SIGNIFICANT CHANGE UP
RBC # BLD: 4.71 M/UL — SIGNIFICANT CHANGE UP (ref 3.8–5.2)
RBC # FLD: 16.7 % — HIGH (ref 10.3–14.5)
RSV RNA NPH QL NAA+NON-PROBE: SIGNIFICANT CHANGE UP
SARS-COV-2 RNA SPEC QL NAA+PROBE: SIGNIFICANT CHANGE UP
SODIUM SERPL-SCNC: 143 MMOL/L — SIGNIFICANT CHANGE UP (ref 135–145)
SOURCE RESPIRATORY: SIGNIFICANT CHANGE UP
SP GR SPEC: <1.005 — LOW (ref 1–1.03)
TROPONIN T, HIGH SENSITIVITY RESULT: <6 NG/L — SIGNIFICANT CHANGE UP (ref 0–51)
TROPONIN T, HIGH SENSITIVITY RESULT: <6 NG/L — SIGNIFICANT CHANGE UP (ref 0–51)
UROBILINOGEN FLD QL: 0.2 MG/DL — SIGNIFICANT CHANGE UP (ref 0.2–1)
WBC # BLD: 8.04 K/UL — SIGNIFICANT CHANGE UP (ref 3.8–10.5)
WBC # FLD AUTO: 8.04 K/UL — SIGNIFICANT CHANGE UP (ref 3.8–10.5)

## 2025-06-13 PROCEDURE — 84295 ASSAY OF SERUM SODIUM: CPT

## 2025-06-13 PROCEDURE — 87637 SARSCOV2&INF A&B&RSV AMP PRB: CPT

## 2025-06-13 PROCEDURE — 85018 HEMOGLOBIN: CPT

## 2025-06-13 PROCEDURE — 36000 PLACE NEEDLE IN VEIN: CPT

## 2025-06-13 PROCEDURE — 85025 COMPLETE CBC W/AUTO DIFF WBC: CPT

## 2025-06-13 PROCEDURE — 84132 ASSAY OF SERUM POTASSIUM: CPT

## 2025-06-13 PROCEDURE — 93010 ELECTROCARDIOGRAM REPORT: CPT

## 2025-06-13 PROCEDURE — 93005 ELECTROCARDIOGRAM TRACING: CPT

## 2025-06-13 PROCEDURE — 84100 ASSAY OF PHOSPHORUS: CPT

## 2025-06-13 PROCEDURE — 84702 CHORIONIC GONADOTROPIN TEST: CPT

## 2025-06-13 PROCEDURE — 82435 ASSAY OF BLOOD CHLORIDE: CPT

## 2025-06-13 PROCEDURE — 71046 X-RAY EXAM CHEST 2 VIEWS: CPT

## 2025-06-13 PROCEDURE — 81003 URINALYSIS AUTO W/O SCOPE: CPT

## 2025-06-13 PROCEDURE — 82330 ASSAY OF CALCIUM: CPT

## 2025-06-13 PROCEDURE — 99285 EMERGENCY DEPT VISIT HI MDM: CPT

## 2025-06-13 PROCEDURE — 71046 X-RAY EXAM CHEST 2 VIEWS: CPT | Mod: 26

## 2025-06-13 PROCEDURE — 84484 ASSAY OF TROPONIN QUANT: CPT

## 2025-06-13 PROCEDURE — 83735 ASSAY OF MAGNESIUM: CPT

## 2025-06-13 PROCEDURE — 82803 BLOOD GASES ANY COMBINATION: CPT

## 2025-06-13 PROCEDURE — 80053 COMPREHEN METABOLIC PANEL: CPT

## 2025-06-13 PROCEDURE — 85014 HEMATOCRIT: CPT

## 2025-06-13 PROCEDURE — 99285 EMERGENCY DEPT VISIT HI MDM: CPT | Mod: 25

## 2025-06-13 PROCEDURE — 83605 ASSAY OF LACTIC ACID: CPT

## 2025-06-13 PROCEDURE — 82947 ASSAY GLUCOSE BLOOD QUANT: CPT

## 2025-06-13 RX ORDER — SODIUM CHLORIDE 9 G/1000ML
1000 INJECTION, SOLUTION INTRAVENOUS ONCE
Refills: 0 | Status: COMPLETED | OUTPATIENT
Start: 2025-06-13 | End: 2025-06-13

## 2025-06-13 RX ADMIN — SODIUM CHLORIDE 2000 MILLILITER(S): 9 INJECTION, SOLUTION INTRAVENOUS at 16:53

## 2025-06-13 NOTE — ED PROVIDER NOTE - NSFOLLOWUPINSTRUCTIONS_ED_ALL_ED_FT
Consulte con parra médico de cabecera esta semana para cristiane max de seguimiento. Llame para hablarlo.    Manténgase teetee hidratado, consuma cristiane dieta saludable y continúe con ean medicamentos y tratamientos actuales.    Consulte la información sobre el que síncope y devuelva las instrucciones que le dieron.    Busque atención médica inmediata si presenta síntomas o inquietudes nuevos o que empeoran.

## 2025-06-13 NOTE — ED PROVIDER NOTE - OBJECTIVE STATEMENT
50-year-old female past medical history of hypertension presents emergency department due to lightheadedness, palpitations. Feels like she is close to passing out. Patient states been ongoing since last night.  States she just feels off and tired.  She does not really have much chest pain but says that there may be some chest tightness with some possible associated shortness of breath.  Also notes she feels like she is a very dry mouth and was outside most of the day yesterday.  She feels very thirsty and has been drinking a lot but cannot get rid of the dry mouth.  Denies fever, chills, nausea, vomiting, headache, vision changes.

## 2025-06-13 NOTE — ED ADULT NURSE NOTE - OBJECTIVE STATEMENT
patient is a 51 y/o F who presents to the ED via triage with multiple medical complaints. patient states that since last night she has been experiencing lightheadedness, palpitations, and HTN. patient states that she spent much of the day in the sun yesterday and after is when she started not feeling well. patient states that she felt like "passing out" this morning which prompted her to seek evaluation. patient is a&ox4, Estonian speaking. respirations even and unlabored. abdomen soft, nondistended. skin intact.  denies fevers/chills, numbness/tingling, weakness, headache, vision changes, cp, sob, cough, abd pain, n/v/d, dysuria, hematuria, bloody stools, back pain. IV in place from Juan Jose Fernandes at bedside to assess

## 2025-06-13 NOTE — ED PROVIDER NOTE - PHYSICAL EXAMINATION
Gen: No acute distress  HEENT: Pupils reactive, EOMI, no nystagmus. No nasal discharge  CV: RRR, +S1/S2, 2+ radial pulses b/l  Resp: CTAB, no W/R/R, no accessory muscle use, no increased work of breathing  GI: No tenderness to palpation. Abdomen soft non-distended, non-rigid  MSK: No open wounds, no bruising, no unilateral LE edema  Neuro: A&Ox3, following commands, moving all four extremities spontaneously. CN II-XII intact, sensations intact bilaterally in all extremities, strength 5/5 in all extremities bilaterally. Normal finger to nose. Patient is ambulating appropriately.   Psych: appropriate mood

## 2025-06-13 NOTE — ED PROVIDER NOTE - ATTENDING CONTRIBUTION TO CARE
------------ATTENDING NOTE------------  pt w/ daughter c/o generalized malaise / weakness since this AM, feeling lightheaded / near passing out since this AM, no chest pain / discomfort, has some nasal congestion, no dyspnea, has mild lower back ache, has urinary frequency, no nausea/vomiting, no change ion BM/stools, no fevers, triage labs / CXR wnl (my interpretation radiograph c/w prelim) -- overall well appearing, nontoxic, nad, clear chest w/o distress, nml cardiac exam, equal distal pulses, soft benign abd, no edema/calf tenderness, awaiting repeat lab / Tn / close reassessments / PO -->  - Jose G Fernandes MD   --------------------------------------------------------- ------------ATTENDING NOTE------------  pt w/ daughter c/o generalized malaise / weakness since this AM, feeling lightheaded / near passing out since this AM, no chest pain / discomfort, has some nasal congestion, no dyspnea, has mild lower back ache, has urinary frequency, no nausea/vomiting, no change ion BM/stools, no fevers, triage labs / CXR wnl (my interpretation radiograph c/w prelim) -- overall well appearing, nontoxic, nad, clear chest w/o distress, nml cardiac exam, equal distal pulses, soft benign abd, no edema/calf tenderness, awaiting repeat lab / Tn / close reassessments / PO --> labs wnl, remained stable / asymptomatic, tolerating PO, nml VS at [place], has PCP, uin depth dw all about ddx, tx, antunez, continued close outpt fu,  - Jose G Fernandes MD   ---------------------------------------------------------

## 2025-06-13 NOTE — ED PROVIDER NOTE - PATIENT PORTAL LINK FT
You can access the FollowMyHealth Patient Portal offered by [facility] by registering at the following website: [email or web address]. By joining [facility]’s FollowMyHealth portal, you will also be able to view your health information using other applications (apps) compatible with our system.

## 2025-06-13 NOTE — ED ADULT NURSE NOTE - NSFALLRISKINTERV_ED_ALL_ED

## 2025-06-13 NOTE — ED ADULT TRIAGE NOTE - CHIEF COMPLAINT QUOTE
Lightheaded, palpitations, and HTN since last night.  Of note, PT taking doxycycline for skin infection and spent all day in the sun yesterday.

## 2025-06-13 NOTE — ED PROVIDER NOTE - RAPID ASSESSMENT
49yo F with PMH of HTN (noncompliant with meds) presenting with feeling lightheaded, nauseas, and chest "vibrating" since last night. +associated SOB. Reports she does not have palpitations. Pt with daughter at bedside assisting with Prydeinig Interpretation per pt preference. Pt reports she felt well yesterday, was out shopping all day, and symptoms began while on her way home last night around 8pm. Reports she feels like her tongue is very dry. Denies fever/chills, recent illness, sick contacts, recent travel, cough, abdominal pain, vomiting, diarrhea, urinary symptoms.     Patient was rapidly assessed by me,  Berta Payne PA-C, in ED triage BLUE area. A limited history was obtained. No physical exam was performed in this area as patient is fully dressed.The patient will be seen, examined, and further worked up in the main ED and their care will be completed by the main ED team. Receiving team will follow up on labs, analgesia, any clinical imaging, and perform reassessment and disposition of the patient as clinically indicated. All decisions regarding the progression of care will be made at their discretion. 49yo F with PMH of HTN (noncompliant with meds) presenting with feeling lightheaded, nauseas, generalized weakness, and chest "vibrating" since last night. +associated SOB. Reports she does not have palpitations. Pt with daughter at bedside assisting with Persian Interpretation per pt preference. Pt reports she felt well yesterday, was out shopping all day, and symptoms began while on her way home last night around 8pm. Reports she feels like her tongue is very dry. Denies fever/chills, recent illness, sick contacts, recent travel, cough, abdominal pain, vomiting, diarrhea, urinary symptoms.     Patient was rapidly assessed by me,  Berta Payne PA-C, in ED triage BLUE area. A limited history was obtained. No physical exam was performed in this area as patient is fully dressed.The patient will be seen, examined, and further worked up in the main ED and their care will be completed by the main ED team. Receiving team will follow up on labs, analgesia, any clinical imaging, and perform reassessment and disposition of the patient as clinically indicated. All decisions regarding the progression of care will be made at their discretion.

## 2025-06-18 ENCOUNTER — APPOINTMENT (OUTPATIENT)
Dept: DERMATOLOGY | Facility: CLINIC | Age: 50
End: 2025-06-18
Payer: MEDICAID

## 2025-06-18 VITALS — WEIGHT: 187 LBS | HEIGHT: 67 IN | BODY MASS INDEX: 29.35 KG/M2

## 2025-06-18 PROCEDURE — 99214 OFFICE O/P EST MOD 30 MIN: CPT

## 2025-06-18 PROCEDURE — G2211 COMPLEX E/M VISIT ADD ON: CPT | Mod: NC

## 2025-06-18 NOTE — PHYSICAL EXAM
[FreeTextEntry3] :  Gen:                        Well appearing, well nourished, NAD. Skin:                       Eccrine:                 Within normal limits               Face:                      Neck:                          Abdomen:             Neuro:                     No focal deficits. Age appropriate. Psych:                     Appropriate affect.

## 2025-06-18 NOTE — ASSESSMENT
[FreeTextEntry1] : -- RPA with recurrent folliculitis on abdomen and thighs. No e/o HS thus far doing better s/p doxy. used for short duration however and then d/c'd d/t photosensitivity. no longer using using clinda lotion. ran out of hibiclens -1 lesion mild flare on abd today, waistline  #Folliculitis, chronic recurrent -s/p I+D of thigh with gen surg, doxy, cefazolin, vanc, bactrim in past -daily hygiene measures reviewed -restart daily hibiclens wash, eo day if drying -afterwards apply clinda cream -recommend hold off on additional PO abx at this time. if needed in future, consider restart bactrim (phototoxicity with doxy, penicillin allergy noted - "palpitations")

## 2025-06-18 NOTE — HISTORY OF PRESENT ILLNESS
[FreeTextEntry1] : recurrent folliculitis [de-identified] : RPA with recurrent folliculitis on abdomen and thighs. No e/o HS thus far doing better s/p doxy. used for short duration however and then d/c'd d/t photosensitivity. no longer using using clinda lotion. ran out of hibiclens

## 2025-07-22 ENCOUNTER — APPOINTMENT (OUTPATIENT)
Dept: INTERNAL MEDICINE | Facility: CLINIC | Age: 50
End: 2025-07-22
Payer: MEDICAID

## 2025-07-22 VITALS
DIASTOLIC BLOOD PRESSURE: 90 MMHG | OXYGEN SATURATION: 99 % | BODY MASS INDEX: 29.66 KG/M2 | HEIGHT: 67 IN | TEMPERATURE: 98.8 F | SYSTOLIC BLOOD PRESSURE: 170 MMHG | HEART RATE: 102 BPM | WEIGHT: 189 LBS | RESPIRATION RATE: 16 BRPM

## 2025-07-22 DIAGNOSIS — R73.09 OTHER ABNORMAL GLUCOSE: ICD-10-CM

## 2025-07-22 DIAGNOSIS — E78.5 HYPERLIPIDEMIA, UNSPECIFIED: ICD-10-CM

## 2025-07-22 DIAGNOSIS — I10 ESSENTIAL (PRIMARY) HYPERTENSION: ICD-10-CM

## 2025-07-22 DIAGNOSIS — R51.9 HEADACHE, UNSPECIFIED: ICD-10-CM

## 2025-07-22 DIAGNOSIS — D50.9 IRON DEFICIENCY ANEMIA, UNSPECIFIED: ICD-10-CM

## 2025-07-22 PROCEDURE — 99213 OFFICE O/P EST LOW 20 MIN: CPT

## 2025-07-22 NOTE — HISTORY OF PRESENT ILLNESS
[de-identified] : 50 year old  female patient with history of stable Hypertension, Hyperlipidemia, Iron Deficiency Anemia, Vitamin D Deficiency, Elevated Hemoglobin A1c, Chronic Back Pain, Renal Stone, history as stated, presented for follow up examination with C/O headaches following Doxycycline Rx from [facility], no visual symptoms, nausea or vomiting at this time. Patient is compliant with all medications. ROS as stated.

## 2025-07-22 NOTE — HEALTH RISK ASSESSMENT
[No] : In the past 12 months have you used drugs other than those required for medical reasons? No [No falls in past year] : Patient reported no falls in the past year [0] : 2) Feeling down, depressed, or hopeless: Not at all (0) [Never] : Never [de-identified] : [facility] [[id number]] : 0

## 2025-07-22 NOTE — ASSESSMENT
[FreeTextEntry1] : 50 year old female found to have stable Hypertension (noncompliant with Amlodipine, counseled to be compliant again during today's examination), Hyperlipidemia, Iron Deficiency Anemia, Vitamin D Deficiency, Elevated Hemoglobin A1c, Chronic Back Pain, Renal Stone, with the current prescription regimen as recommended, diet and lifestyle modifications, as counseled. Prior results reviewed, interpreted and discussed with the patient during today's examination, as appropriate. Follow up, treatment plan and tests, as ordered.  Supplemental history was obtained from , who is accompanying the patient for today's examination.  Current symptoms are consistent with Headache of unknown etiology, patient was counseled that it could be less likely due to Doxycycline, as symptoms persisted in spite of stopping the medication, NEURO F/U for further assessment for possible Migraine, Postmenopausal Symptoms and other causes, as counseled.  Total time spent:: 25 minutes Including: Preparation prior to visit - Reviewing prior record, results of tests and Consultation Reports as applicable Conducting an appropriate H & P during today's encounter Appropriate orders for tests, medications and procedures, as applicable Counseling patient Note completion